# Patient Record
Sex: MALE | Race: WHITE | Employment: OTHER | URBAN - METROPOLITAN AREA
[De-identification: names, ages, dates, MRNs, and addresses within clinical notes are randomized per-mention and may not be internally consistent; named-entity substitution may affect disease eponyms.]

---

## 2017-01-19 ENCOUNTER — OFFICE VISIT (OUTPATIENT)
Dept: ORTHOPEDICS | Facility: CLINIC | Age: 82
End: 2017-01-19
Payer: COMMERCIAL

## 2017-01-19 VITALS
WEIGHT: 117 LBS | BODY MASS INDEX: 19.97 KG/M2 | SYSTOLIC BLOOD PRESSURE: 152 MMHG | DIASTOLIC BLOOD PRESSURE: 72 MMHG | HEIGHT: 64 IN

## 2017-01-19 DIAGNOSIS — M25.512 PAIN IN JOINT OF LEFT SHOULDER: ICD-10-CM

## 2017-01-19 DIAGNOSIS — M75.82 TENDINITIS OF LEFT ROTATOR CUFF: Primary | ICD-10-CM

## 2017-01-19 PROCEDURE — 99213 OFFICE O/P EST LOW 20 MIN: CPT | Mod: 25 | Performed by: FAMILY MEDICINE

## 2017-01-19 PROCEDURE — 20610 DRAIN/INJ JOINT/BURSA W/O US: CPT | Mod: LT | Performed by: FAMILY MEDICINE

## 2017-01-19 RX ORDER — TRIAMCINOLONE ACETONIDE 40 MG/ML
40 INJECTION, SUSPENSION INTRA-ARTICULAR; INTRAMUSCULAR ONCE
Qty: 1 ML | Refills: 0 | OUTPATIENT
Start: 2017-01-19 | End: 2017-01-19

## 2017-01-19 NOTE — NURSING NOTE
"Initial /72 mmHg  Ht 5' 3.5\" (1.613 m)  Wt 117 lb (53.071 kg)  BMI 20.40 kg/m2 Estimated body mass index is 20.4 kg/(m^2) as calculated from the following:    Height as of this encounter: 5' 3.5\" (1.613 m).    Weight as of this encounter: 117 lb (53.071 kg). .    Eduard Valle ATC  "
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
## 2017-01-23 DIAGNOSIS — R55 SYNCOPE AND COLLAPSE: ICD-10-CM

## 2017-01-23 DIAGNOSIS — I10 ESSENTIAL HYPERTENSION WITH GOAL BLOOD PRESSURE LESS THAN 140/90: ICD-10-CM

## 2017-01-23 DIAGNOSIS — E78.5 HYPERLIPIDEMIA LDL GOAL <100: ICD-10-CM

## 2017-01-23 DIAGNOSIS — I25.10 CORONARY ARTERY DISEASE INVOLVING NATIVE CORONARY ARTERY, ANGINA PRESENCE UNSPECIFIED, UNSPECIFIED WHETHER NATIVE OR TRANSPLANTED HEART: ICD-10-CM

## 2017-01-23 DIAGNOSIS — I25.10 CORONARY ARTERY DISEASE INVOLVING NATIVE CORONARY ARTERY OF NATIVE HEART WITHOUT ANGINA PECTORIS: ICD-10-CM

## 2017-01-23 LAB
ANION GAP SERPL CALCULATED.3IONS-SCNC: 8 MMOL/L (ref 3–14)
BUN SERPL-MCNC: 29 MG/DL (ref 7–30)
CALCIUM SERPL-MCNC: 9.2 MG/DL (ref 8.5–10.1)
CHLORIDE SERPL-SCNC: 105 MMOL/L (ref 94–109)
CO2 SERPL-SCNC: 25 MMOL/L (ref 20–32)
CREAT SERPL-MCNC: 1.31 MG/DL (ref 0.66–1.25)
GFR SERPL CREATININE-BSD FRML MDRD: 52 ML/MIN/1.7M2
GLUCOSE SERPL-MCNC: 106 MG/DL (ref 70–99)
POTASSIUM SERPL-SCNC: 3.9 MMOL/L (ref 3.4–5.3)
SODIUM SERPL-SCNC: 138 MMOL/L (ref 133–144)

## 2017-01-23 PROCEDURE — 36415 COLL VENOUS BLD VENIPUNCTURE: CPT | Performed by: INTERNAL MEDICINE

## 2017-01-23 PROCEDURE — 80048 BASIC METABOLIC PNL TOTAL CA: CPT | Performed by: INTERNAL MEDICINE

## 2017-01-25 ENCOUNTER — OFFICE VISIT (OUTPATIENT)
Dept: CARDIOLOGY | Facility: CLINIC | Age: 82
End: 2017-01-25
Attending: INTERNAL MEDICINE
Payer: COMMERCIAL

## 2017-01-25 VITALS
OXYGEN SATURATION: 97 % | BODY MASS INDEX: 19.7 KG/M2 | DIASTOLIC BLOOD PRESSURE: 62 MMHG | HEART RATE: 68 BPM | WEIGHT: 113 LBS | SYSTOLIC BLOOD PRESSURE: 104 MMHG

## 2017-01-25 DIAGNOSIS — I25.10 CORONARY ARTERY DISEASE INVOLVING NATIVE CORONARY ARTERY OF NATIVE HEART WITHOUT ANGINA PECTORIS: Primary | ICD-10-CM

## 2017-01-25 DIAGNOSIS — E78.5 HYPERLIPIDEMIA LDL GOAL <100: ICD-10-CM

## 2017-01-25 DIAGNOSIS — I10 ESSENTIAL HYPERTENSION WITH GOAL BLOOD PRESSURE LESS THAN 140/90: ICD-10-CM

## 2017-01-25 PROCEDURE — 99214 OFFICE O/P EST MOD 30 MIN: CPT | Performed by: PHYSICIAN ASSISTANT

## 2017-01-25 RX ORDER — ASPIRIN 325 MG
TABLET, DELAYED RELEASE (ENTERIC COATED) ORAL DAILY
COMMUNITY
End: 2017-09-28

## 2017-01-25 NOTE — PROGRESS NOTES
"HISTORY OF PRESENT ILLNESS:  Mr. Melgar is a pleasant 82-year-old gentleman who presents to the Cardiology office today for a 3-month followup.      The patient's cardiac history includes coronary artery disease status post myocardial infarction in 08/2009.  At that time, he underwent cardiac catheterization which showed 2-vessel coronary artery disease involving the LAD and obtuse marginal branch of the circumflex.  He did have a drug-eluting stent placed to the proximal LAD as well as angioplasty of the obtuse marginal at that time.  He also has a history of hypertension and hyperlipidemia as well as Alzheimer's.  He was last in to see Dr. Fisher in November.  No medication changes were made at that time.      At this point, the patient appears to overall be doing well from a cardiac standpoint.  He has some noncardiac issues including some shoulder discomfort for which he recently had an injection.  He and his wife are also somewhat concerned as his weight apparently has decreased 4 pounds in the past week.  This is based on 2 different scales at different offices.  The patient does not follow his weight at home.  The patient's wife states that he \"hardly eats anything.\"  When I review his weights in the chart over the past year, they have overall been fairly stable.      CURRENT CARDIAC MEDICATIONS:   1.  Aspirin 325 mg daily.   2.  Amlodipine 5 mg daily.   3.  Lisinopril 2.5 mg daily.   4.  Atorvastatin 20 mg daily.   5.  Toprol-XL 25 mg daily.   6.  Plavix 75 mg daily.   7.  Sublingual nitro p.r.n.      The remainder of his medications, allergies and review of systems were reviewed and as are documented separately.      PHYSICAL EXAMINATION:   GENERAL:  The patient is a pleasant 82-year-old gentleman who appears his stated age.  He is in no apparent distress.   VITAL SIGNS:  His blood pressure is 104/62, pulse is 68, weight is 113 pounds.   LUNGS:  Clear to auscultation bilaterally.   CARDIAC:  Reveals a " regular rate and rhythm, no murmurs appreciated.   ABDOMEN:  Soft, nontender, nondistended.   LOWER EXTREMITIES:  Show no evidence of edema.   NEUROLOGIC:  Alert and oriented.      LABORATORY DATA:  Basic metabolic panel from earlier this week shows a sodium of 138, potassium of 3.9, BUN of 29 and stable creatinine at 1.3.      ASSESSMENT:   1.  Coronary artery disease with prior stenting to the LAD and angioplasty.  The patient is not having any anginal symptoms.  He continues on excellent medical therapy.   2.  Hypertension.  Overall, appears well controlled.  No changes at this time.   3.  Hyperlipidemia.  Overall, well controlled.   4.  Alzheimer's.  He continues to follow with Neurology.      PLAN:   1.  The patient will continue his current cardiac medication regimen unchanged.   2.  I did try to encourage the patient to increase his nutritional intake.  I also suggested that they follow the patient's weight at home and if he truly is losing weight they need to discuss this with his primary care provider.   3.  The patient will follow up in the Cardiology office with Dr. Fisher in .  He will have repeat blood work and an echocardiogram prior to that office visit.  Of course, I encouraged his wife to contact us sooner with questions or concerns.         JACQUI ANN PA-C             D: 2017 11:33   T: 2017 13:52   MT: GEE      Name:     CONRADO WALDEN   MRN:      [id number]        Account:      [id number]   :      1934           Service Date: 2017      Document: [id number]

## 2017-01-25 NOTE — PATIENT INSTRUCTIONS
Thank you for your [facility] visit today. Your provider has recommended the following:  Medication Changes:  No changes today  Recommendations:  Try to increase your nutritional intake with more food/snacks. Or to use ensure or protein shakes for snacks (try to still eat meals even if you drink these)  Follow-up:  See Dr Fisher for cardiology follow up in May with labs and an echo prior.  We kindly ask that you call cardiology scheduling at [phone] three months prior to requested revisit date to schedule future cardiology appointments.  Reminder:  1. Please bring in your current medication list or your medication, over the counter supplements and vitamin bottles as we will review these at each office visit.               [facility]  [facility]~[address]~[place], [place]~[address]  Questions about your visit today?   Call your Cardiology Clinic RN's-Staci Cazares and/or Shilpi Acevedo at [phone].

## 2017-01-25 NOTE — MR AVS SNAPSHOT
After Visit Summary   1/25/2017    Evin Melgar    MRN: [id number]           Patient Information     Date Of Birth          6/1/1934        Visit Information        Provider Department      1/25/2017 9:40 AM Anitra Montaño PA-C [facility] PHYSICIAN HEART AT [facility]        Today's Diagnoses     Coronary artery disease involving native coronary artery of native heart without angina pectoris         Essential hypertension with goal blood pressure less than 140/90         Hyperlipidemia LDL goal <100         Syncope and collapse         Coronary artery disease involving native coronary artery, angina presence unspecified, unspecified whether native or transplanted heart           Care Instructions    Thank you for your [facility] visit today. Your provider has recommended the following:  Medication Changes:  No changes today  Recommendations:  Try to increase your nutritional intake with more food/snacks. Or to use ensure or protein shakes for snacks (try to still eat meals even if you drink these)  Follow-up:  See Dr Fisher for cardiology follow up in May with labs and an echo prior.  We kindly ask that you call cardiology scheduling at [phone] three months prior to requested revisit date to schedule future cardiology appointments.  Reminder:  1. Please bring in your current medication list or your medication, over the counter supplements and vitamin bottles as we will review these at each office visit.               [facility] HE[facility]ARE  [facility]~[address]~[place], [place]~[address]  Questions about your visit today?   Call your Cardiology Clinic RN's-Staci Cazares and/or Shilpi Acevedo at [phone].          Follow-ups after your visit        Your next 10 appointments already scheduled     Mar 16, 2017  2:15 PM   Return Visit with Katherine Lopez MD   [facility]    [address]  [place]  [facility] [address]   [phone]              Who to contact     If you have questions or need follow up information about today's clinic visit or your schedule please contact [facility] PHYSICIAN HEART AT [facility] directly at [phone].  Normal or non-critical lab and imaging results will be communicated to you by MyChart, letter or phone within 4 business days after the clinic has received the results. If you do not hear from us within 7 days, please contact the clinic through [employer] or phone. If you have a critical or abnormal lab result, we will notify you by phone as soon as possible.  Submit refill requests through [employer] or call your pharmacy and they will forward the refill request to us. Please allow 3 business days for your refill to be completed.          Additional Information About Your Visit        MyChart Information     [employer] gives you secure access to your electronic health record. If you see a primary care provider, you can also send messages to your care team and make appointments. If you have questions, please call your primary care clinic.  If you do not have a primary care provider, please call [phone] and they will assist you.        Care EveryWhere ID     This is your Care EveryWhere ID. This could be used by other organizations to access your [place] medical records  [id number]        Your Vitals Were     Pulse Pulse Oximetry                68 97%           Blood Pressure from Last 3 Encounters:   01/25/17 104/62   01/19/17 152/72   11/02/16 132/64    Weight from Last 3 Encounters:   01/25/17 51.256 kg (113 lb)   01/19/17 53.071 kg (117 lb)   11/02/16 53.071 kg (117 lb)              We Performed the Following     Follow-Up with Cardiac Advanced Practice Provider          Today's Medication Changes          These changes are accurate as of: 1/25/17  9:59 AM.  If you have any questions, ask your nurse or doctor.               These medicines  have changed or have updated prescriptions.        Dose/Directions    aspirin 325 MG EC tablet   This may have changed:  Another medication with the same name was removed. Continue taking this medication, and follow the directions you see here.        Take by mouth daily   Refills:  0                Primary Care Provider Office Phone # Fax #    [place] Mehdi Perez [phone]       [facility]        [address] [place] [address]        Thank you!     Thank you for choosing [facility] PHYSICIAN HEART AT [facility]  for your care. Our goal is always to provide you with excellent care. Hearing back from our patients is one way we can continue to improve our services. Please take a few minutes to complete the written survey that you may receive in the mail after your visit with us. Thank you!             Your Updated Medication List - Protect others around you: Learn how to safely use, store and throw away your medicines at www.disposemymeds.org.          This list is accurate as of: 1/25/17  9:59 AM.  Always use your most recent med list.                   Brand Name Dispense Instructions for use    albuterol 108 (90 BASE) MCG/ACT Inhaler    albuterol    1 Inhaler    Inhale 2 puffs into the lungs every 4 hours as needed for shortness of breath / dyspnea Rinse mouthpiece weekly.       amLODIPine 5 MG tablet    NORVASC     Take 1 tablet (5 mg) by mouth daily       aspirin 325 MG EC tablet      Take by mouth daily       atorvastatin 20 MG tablet    LIPITOR    90 tablet    Take 1 tablet (20 mg) by mouth daily       B-12 1000 MCG Tbcr     100 tablet    Take 1,000 mcg by mouth daily       clopidogrel 75 MG tablet    PLAVIX    90 tablet    Take 1 tablet (75 mg) by mouth daily       lisinopril 2.5 MG tablet    PRINIVIL/Zestril    90 tablet    Take 1 tablet (2.5 mg) by mouth daily       metoprolol 25 MG 24 hr tablet    TOPROL-XL    90 tablet    Take 1  tablet (25 mg) by mouth daily       nitroglycerin 0.4 MG sublingual tablet    NITROSTAT    25 tablet    Place 1 tablet (0.4 mg) under the tongue every 5 minutes as needed for chest pain for chest pain       ranitidine 150 MG tablet    ZANTAC    90 tablet    Take 1 tablet (150 mg) by mouth daily as needed for heartburn

## 2017-01-25 NOTE — Clinical Note
"1/25/2017    Brian Perez MD  [facility]          [address] [place] [address]    RE: Evin Melgar       Dear Colleague,    I had the pleasure of seeing Evin Melgar in the [facility].    Mr. Melgar is a pleasant 82-year-old gentleman who presents to the Cardiology office today for a 3-month followup.      The patient's cardiac history includes coronary artery disease status post myocardial infarction in 08/2009.  At that time, he underwent cardiac catheterization which showed 2-vessel coronary artery disease involving the LAD and obtuse marginal branch of the circumflex.  He did have a drug-eluting stent placed to the proximal LAD as well as angioplasty of the obtuse marginal at that time.  He also has a history of hypertension and hyperlipidemia as well as Alzheimer's.  He was last in to see Dr. Fisher in November.  No medication changes were made at that time.      At this point, the patient appears to overall be doing well from a cardiac standpoint.  He has some noncardiac issues including some shoulder discomfort for which he recently had an injection.  He and his wife are also somewhat concerned as his weight apparently has decreased 4 pounds in the past week.  This is based on 2 different scales at different offices.  The patient does not follow his weight at home.  The patient's wife states that he \"hardly eats anything.\"  When I review his weights in the chart over the past year, they have overall been fairly stable.      CURRENT CARDIAC MEDICATIONS:   1.  Aspirin 325 mg daily.   2.  Amlodipine 5 mg daily.   3.  Lisinopril 2.5 mg daily.   4.  Atorvastatin 20 mg daily.   5.  Toprol-XL 25 mg daily.   6.  Plavix 75 mg daily.   7.  Sublingual nitro p.r.n.      The remainder of his medications, allergies and review of systems were reviewed and as are documented separately.      PHYSICAL EXAMINATION:   GENERAL:  The patient is a pleasant " 82-year-old gentleman who appears his stated age.  He is in no apparent distress.   VITAL SIGNS:  His blood pressure is 104/62, pulse is 68, weight is 113 pounds.   LUNGS:  Clear to auscultation bilaterally.   CARDIAC:  Reveals a regular rate and rhythm, no murmurs appreciated.   ABDOMEN:  Soft, nontender, nondistended.   LOWER EXTREMITIES:  Show no evidence of edema.   NEUROLOGIC:  Alert and oriented.      LABORATORY DATA:  Basic metabolic panel from earlier this week shows a sodium of 138, potassium of 3.9, BUN of 29 and stable creatinine at 1.3.      ASSESSMENT:   1.  Coronary artery disease with prior stenting to the LAD and angioplasty.  The patient is not having any anginal symptoms.  He continues on excellent medical therapy.   2.  Hypertension.  Overall, appears well controlled.  No changes at this time.   3.  Hyperlipidemia.  Overall, well controlled.   4.  Alzheimer's.  He continues to follow with Neurology.      PLAN:   1.  The patient will continue his current cardiac medication regimen unchanged.   2.  I did try to encourage the patient to increase his nutritional intake.  I also suggested that they follow the patient's weight at home and if he truly is losing weight they need to discuss this with his primary care provider.   3.  The patient will follow up in the Cardiology office with Dr. Fisher in June.  He will have repeat blood work and an echocardiogram prior to that office visit.  Of course, I encouraged his wife to contact us sooner with questions or concerns.     Again, thank you for allowing me to participate in the care of your patient.      Sincerely,    Anitra Montaño PA-C     [facility]

## 2017-01-25 NOTE — PROGRESS NOTES
CURRENT MEDICATIONS:  Current Outpatient Prescriptions   Medication Sig Dispense Refill     aspirin 325 MG EC tablet Take by mouth daily       ranitidine (ZANTAC) 150 MG tablet Take 1 tablet (150 mg) by mouth daily as needed for heartburn 90 tablet 2     amLODIPine (NORVASC) 5 MG tablet Take 1 tablet (5 mg) by mouth daily       Cyanocobalamin (B-12) 1000 MCG TBCR Take 1,000 mcg by mouth daily 100 tablet 1     lisinopril (PRINIVIL,ZESTRIL) 2.5 MG tablet Take 1 tablet (2.5 mg) by mouth daily 90 tablet 3     atorvastatin (LIPITOR) 20 MG tablet Take 1 tablet (20 mg) by mouth daily 90 tablet 3     metoprolol (TOPROL-XL) 25 MG 24 hr tablet Take 1 tablet (25 mg) by mouth daily 90 tablet 3     clopidogrel (PLAVIX) 75 MG tablet Take 1 tablet (75 mg) by mouth daily 90 tablet 3     nitroglycerin (NITROSTAT) 0.4 MG SL tablet Place 1 tablet (0.4 mg) under the tongue every 5 minutes as needed for chest pain for chest pain 25 tablet 3     albuterol (ALBUTEROL) 108 (90 BASE) MCG/ACT inhaler Inhale 2 puffs into the lungs every 4 hours as needed for shortness of breath / dyspnea Rinse mouthpiece weekly. 1 Inhaler 11       ALLERGIES   No Known Allergies    PAST MEDICAL HISTORY:  Past Medical History   Diagnosis Date     Unspecified essential hypertension      Inguinal hernia without mention of obstruction or gangrene, unilateral or unspecified, (not specified as recurrent)      CAD (coronary artery disease) 9/4/2007     Unstable angina 8/07: status post PTCA/Cypher drug-eluting stent placed  proximal LAD,  PTCA of OM. 10/07: stopped taking his plavix, went to [facility]--got another angiogram- stents patent.  6/08: angiogram repeated at [facility], unclear reason.  Unknown results. 1/09: echo with normal EF. 7/09: small reversible area in the inferior, basal.  Normal EF. Followed by [facility] - recommend indefinite use of paige     Hypertension 12/13/2005     OAB (overactive bladder) 3/2/2012     PVR 0 2/12.       Microscopic hematuria 3/2/2012      "Memory deficit 3/2/2012     MMSE 23/30 2/12.       Mild persistent asthma 12/13/2005     Uses proair once/day \"out of habit\".  Denies any sob.        CKD (chronic kidney disease), stage III 11/4/2008     GFRs in the 50's     GERD (gastroesophageal reflux disease) 11/4/2008     Controlled on zantac      Tubular adenoma 10/21/2008     Noted on 10/08 colonoscopy, without atypia     Hyperlipidaemia        PAST SURGICAL HISTORY:  Past Surgical History   Procedure Laterality Date     Surgical history of -        Appendectomy     Angiogram  10/07     coronary=mild, diffuse CAD; stent below patent     Angioplasty  9/07     stent to ostia of LAD       Social History:  Social History   Substance Use Topics     Smoking status: Former Smoker     Quit date: 01/01/1949     Smokeless tobacco: Never Used     Alcohol Use: Yes      Comment: occasionally       FAMILY HISTORY:  Family History   Problem Relation Age of Onset     CEREBROVASCULAR DISEASE Mother      Lipids Mother      DIABETES Father      CEREBROVASCULAR DISEASE Father      DIABETES Brother      HEART DISEASE Brother        Review of Systems:  Skin:  Positive for bruising     Eyes:  Negative      ENT:  Positive for tinnitus    Respiratory:  Positive for shortness of breath COPD, Asthma   Cardiovascular:    Positive for;lightheadedness;fatigue;chest pain    Gastroenterology: Positive for heartburn;reflux weight loss, loss of appetite  Genitourinary:  Positive for urinary frequency CKD- patient does not see nephrology  Musculoskeletal:  Positive for   shoulder pain  Neurologic:  Positive for memory problems;headaches, Alzheimers sees Dr. Lopez  Psychiatric:  Positive for anxiety;depression    Heme/Lymph/Imm:  Negative      Endocrine:  Negative        Reviewed. Remainder of the note dictated.    Anitra Montaño PA-C      "

## 2017-02-06 ENCOUNTER — OFFICE VISIT (OUTPATIENT)
Dept: FAMILY MEDICINE | Facility: CLINIC | Age: 82
End: 2017-02-06
Payer: COMMERCIAL

## 2017-02-06 VITALS
TEMPERATURE: 99.5 F | BODY MASS INDEX: 18.85 KG/M2 | DIASTOLIC BLOOD PRESSURE: 70 MMHG | WEIGHT: 110.4 LBS | HEART RATE: 86 BPM | HEIGHT: 64 IN | OXYGEN SATURATION: 95 % | SYSTOLIC BLOOD PRESSURE: 131 MMHG

## 2017-02-06 DIAGNOSIS — J20.9 ACUTE BRONCHITIS WITH SYMPTOMS > 10 DAYS: Primary | ICD-10-CM

## 2017-02-06 PROCEDURE — 99213 OFFICE O/P EST LOW 20 MIN: CPT | Performed by: FAMILY MEDICINE

## 2017-02-06 RX ORDER — BENZONATATE 100 MG/1
100 CAPSULE ORAL 3 TIMES DAILY PRN
Qty: 21 CAPSULE | Refills: 0 | Status: SHIPPED | OUTPATIENT
Start: 2017-02-06 | End: 2017-06-08

## 2017-02-06 RX ORDER — AZITHROMYCIN 250 MG/1
TABLET, FILM COATED ORAL
Qty: 6 TABLET | Refills: 0 | Status: SHIPPED | OUTPATIENT
Start: 2017-02-06 | End: 2017-04-20

## 2017-02-06 NOTE — MR AVS SNAPSHOT
After Visit Summary   2/6/2017    Evin Melgar    MRN: [id number]           Patient Information     Date Of Birth          6/1/1934        Visit Information        Provider Department      2/6/2017 11:20 AM Brian Perez MD [facility]        Today's Diagnoses     Acute bronchitis with symptoms > 10 days    -  1       Care Instructions    Start Azithromycin as prescribed.  Benzonatate perles only as needed for coughing spells.  Albuterol inhaler 2 puffs every 4 hrs as needed for wheezing or coughing spells or tightness in breathing while active.    Bronchitis, Antibiotic Treatment (Adult)    Bronchitis is an infection of the air passages (bronchial tubes) in your lungs. It often occurs when you have a cold. This illness is contagious during the first few days and is spread through the air by coughing and sneezing, or by direct contact (touching the sick person and then touching your own eyes, nose, or mouth).  Symptoms of bronchitis include cough with mucus (phlegm) and low-grade fever. Bronchitis usually lasts 7 to 14 days. Mild cases can be treated with simple home remedies. More severe infection is treated with an antibiotic.  Home care  Follow these guidelines when caring for yourself at home:    If your symptoms are severe, rest at home for the first 2 to 3 days. When you go back to your usual activities, don't let yourself get too tired.    Do not smoke. Also avoid being exposed to secondhand smoke.    You may use over-the-counter medicines to control fever or pain, unless another medicine was prescribed. (Note: If you have chronic liver or kidney disease or have ever had a stomach ulcer or gastrointestinal bleeding, talk with your healthcare provider before using these medicines. Also talk to your provider if you are taking medicine to prevent blood clots.) Aspirin should never be given to anyone younger than 18 years of age who is ill with a viral infection or  fever. It may cause severe liver or brain damage.    Your appetite may be poor, so a light diet is fine. Avoid dehydration by drinking 6 to 8 glasses of fluids per day (such as water, soft drinks, sports drinks, juices, tea, or soup). Extra fluids will help loosen secretions in the nose and lungs.    Over-the-counter cough, cold, and sore-throat medicines will not shorten the length of the illness, but they may be helpful to reduce symptoms. (Note: Do not use decongestants if you have high blood pressure.)    Finish all antibiotic medicine. Do this even if you are feeling better after only a few days.  Follow-up care  Follow up with your healthcare provider, or as advised. If you had an X-ray or ECG (electrocardiogram), a specialist will review it. You will be notified of any new findings that may affect your care.  Note: If you are age 65 or older, or if you have a chronic lung disease or condition that affects your immune system, or you smoke, talk to your healthcare provider about having pneumococcal vaccinations and a yearly influenza vaccination (flu shot).  When to seek medical advice  Call your healthcare provider right away if any of these occur:    Fever of 100.4 F (38 C) or higher    Coughing up increased amounts of colored sputum    Weakness, drowsiness, headache, facial pain, ear pain, or a stiff neck   Call 911, or get immediate medical care  Contact emergency services right away if any of these occur.    Coughing up blood    Worsening weakness, drowsiness, headache, or stiff neck    Trouble breathing, wheezing, or pain with breathing    7238-2207 [employer]. [address] [place] [address], [place] [address]. All rights reserved. This information is not intended as a substitute for professional medical care. Always follow your healthcare professional's instructions.              Follow-ups after your visit        Your next 10 appointments already scheduled     Mar 16, 2017  2:15 PM   [facility] Visit  "with [facility] Lopez MD   [facility]    [address]  [facility] [address] [phone]              Who to contact     If you have questions or need follow up information about today's clinic visit or your schedule please contact [facility] directly at [phone].  Normal or non-critical lab and imaging results will be communicated to you by MyChart, letter or phone within 4 business days after the clinic has received the results. If you do not hear from us within 7 days, please contact the clinic through [employer] or phone. If you have a critical or abnormal lab result, we will notify you by phone as soon as possible.  Submit refill requests through [employer] or call your pharmacy and they will forward the refill request to us. Please allow 3 business days for your refill to be completed.          Additional Information About Your Visit        [employer] Information     [employer] gives you secure access to your electronic health record. If you see a primary care provider, you can also send messages to your care team and make appointments. If you have questions, please call your primary care clinic.  If you do not have a primary care provider, please call [phone] and they will assist you.        Care EveryWhere ID     This is your Care EveryWhere ID. This could be used by other organizations to access your [facility] medical records  [id number]        Your Vitals Were     Pulse Temperature Height BMI (Body Mass Index) Pulse Oximetry       86 99.5  F (37.5  C) (Tympanic) 5' 3.5\" (1.613 m) 19.25 kg/m2 95%        Blood Pressure from Last 3 Encounters:   02/06/17 131/70   01/25/17 104/62   01/19/17 152/72    Weight from Last 3 Encounters:   02/06/17 110 lb 6.4 oz (50.077 kg)   01/25/17 113 lb (51.256 kg)   01/19/17 117 lb (53.071 kg)              Today, you had the following     No orders found for display         Today's Medication Changes        "   These changes are accurate as of: 2/6/17 11:51 AM.  If you have any questions, ask your nurse or doctor.               Start taking these medicines.        Dose/Directions    azithromycin 250 MG tablet   Commonly known as:  ZITHROMAX   Used for:  Acute bronchitis with symptoms > 10 days   Started by:  Brian Perez MD        Two tablets orally  first day, then one tablet orally daily for four days.   Quantity:  6 tablet   Refills:  0       benzonatate 100 MG capsule   Commonly known as:  TESSALON   Used for:  Acute bronchitis with symptoms > 10 days   Started by:  Brian Perez MD        Dose:  100 mg   Take 1 capsule (100 mg) by mouth 3 times daily as needed for cough   Quantity:  21 capsule   Refills:  0            Where to get your medicines      These medications were sent to [address] [address]     Phone:  [phone]    - azithromycin 250 MG tablet  - benzonatate 100 MG capsule             Primary Care Provider Office Phone # Fax #    Brian Perez [phone]       [facility]        [address] [facility] [address]        Thank you!     Thank you for choosing [facility]  for your care. Our goal is always to provide you with excellent care. Hearing back from our patients is one way we can continue to improve our services. Please take a few minutes to complete the written survey that you may receive in the mail after your visit with us. Thank you!             Your Updated Medication List - Protect others around you: Learn how to safely use, store and throw away your medicines at www.disposemymeds.org.          This list is accurate as of: 2/6/17 11:51 AM.  Always use your most recent med list.                   Brand Name Dispense Instructions for use    albuterol 108 (90 BASE) MCG/ACT Inhaler    albuterol    1 Inhaler    Inhale 2  puffs into the lungs every 4 hours as needed for shortness of breath / dyspnea Rinse mouthpiece weekly.       amLODIPine 5 MG tablet    NORVASC     Take 1 tablet (5 mg) by mouth daily       aspirin 325 MG EC tablet      Take by mouth daily       atorvastatin 20 MG tablet    LIPITOR    90 tablet    Take 1 tablet (20 mg) by mouth daily       azithromycin 250 MG tablet    ZITHROMAX    6 tablet    Two tablets orally  first day, then one tablet orally daily for four days.       B-12 1000 MCG Tbcr     100 tablet    Take 1,000 mcg by mouth daily       benzonatate 100 MG capsule    TESSALON    21 capsule    Take 1 capsule (100 mg) by mouth 3 times daily as needed for cough       clopidogrel 75 MG tablet    PLAVIX    90 tablet    Take 1 tablet (75 mg) by mouth daily       lisinopril 2.5 MG tablet    PRINIVIL/Zestril    90 tablet    Take 1 tablet (2.5 mg) by mouth daily       metoprolol 25 MG 24 hr tablet    TOPROL-XL    90 tablet    Take 1 tablet (25 mg) by mouth daily       nitroglycerin 0.4 MG sublingual tablet    NITROSTAT    25 tablet    Place 1 tablet (0.4 mg) under the tongue every 5 minutes as needed for chest pain for chest pain       ranitidine 150 MG tablet    ZANTAC    90 tablet    Take 1 tablet (150 mg) by mouth daily as needed for heartburn

## 2017-02-06 NOTE — PATIENT INSTRUCTIONS
Start Azithromycin as prescribed.  Benzonatate perles only as needed for coughing spells.  Albuterol inhaler 2 puffs every 4 hrs as needed for wheezing or coughing spells or tightness in breathing while active.    Bronchitis, Antibiotic Treatment (Adult)    Bronchitis is an infection of the air passages (bronchial tubes) in your lungs. It often occurs when you have a cold. This illness is contagious during the first few days and is spread through the air by coughing and sneezing, or by direct contact (touching the sick person and then touching your own eyes, nose, or mouth).  Symptoms of bronchitis include cough with mucus (phlegm) and low-grade fever. Bronchitis usually lasts 7 to 14 days. Mild cases can be treated with simple home remedies. More severe infection is treated with an antibiotic.  Home care  Follow these guidelines when caring for yourself at home:    If your symptoms are severe, rest at home for the first 2 to 3 days. When you go back to your usual activities, don't let yourself get too tired.    Do not smoke. Also avoid being exposed to secondhand smoke.    You may use over-the-counter medicines to control fever or pain, unless another medicine was prescribed. (Note: If you have chronic liver or kidney disease or have ever had a stomach ulcer or gastrointestinal bleeding, talk with your healthcare provider before using these medicines. Also talk to your provider if you are taking medicine to prevent blood clots.) Aspirin should never be given to anyone younger than 18 years of age who is ill with a viral infection or fever. It may cause severe liver or brain damage.    Your appetite may be poor, so a light diet is fine. Avoid dehydration by drinking 6 to 8 glasses of fluids per day (such as water, soft drinks, sports drinks, juices, tea, or soup). Extra fluids will help loosen secretions in the nose and lungs.    Over-the-counter cough, cold, and sore-throat medicines will not shorten the length of  the illness, but they may be helpful to reduce symptoms. (Note: Do not use decongestants if you have high blood pressure.)    Finish all antibiotic medicine. Do this even if you are feeling better after only a few days.  Follow-up care  Follow up with your healthcare provider, or as advised. If you had an X-ray or ECG (electrocardiogram), a specialist will review it. You will be notified of any new findings that may affect your care.  Note: If you are age 65 or older, or if you have a chronic lung disease or condition that affects your immune system, or you smoke, talk to your healthcare provider about having pneumococcal vaccinations and a yearly influenza vaccination (flu shot).  When to seek medical advice  Call your healthcare provider right away if any of these occur:    Fever of 100.4 F (38 C) or higher    Coughing up increased amounts of colored sputum    Weakness, drowsiness, headache, facial pain, ear pain, or a stiff neck   Call 911, or get immediate medical care  Contact emergency services right away if any of these occur.    Coughing up blood    Worsening weakness, drowsiness, headache, or stiff neck    Trouble breathing, wheezing, or pain with breathing    1402-1847 [employer]. [address] [place] [address], [place] [address]. All rights reserved. This information is not intended as a substitute for professional medical care. Always follow your healthcare professional's instructions.

## 2017-02-06 NOTE — NURSING NOTE
"Chief Complaint   Patient presents with     URI     Pt has had a cough and cold for the past wk.        Initial /70 mmHg  Pulse 86  Temp(Src) 99.5  F (37.5  C) (Tympanic)  Ht 5' 3.5\" (1.613 m)  Wt 110 lb 6.4 oz (50.077 kg)  BMI 19.25 kg/m2  SpO2 95% Estimated body mass index is 19.25 kg/(m^2) as calculated from the following:    Height as of this encounter: 5' 3.5\" (1.613 m).    Weight as of this encounter: 110 lb 6.4 oz (50.077 kg).  Medication Reconciliation: complete  Laquita Umanzor CMA    "

## 2017-02-06 NOTE — PROGRESS NOTES
SUBJECTIVE:                                                    Evin Melgar is a 82 year old male who presents to clinic today for the following health issues:  Chief Complaint   Patient presents with     URI     Pt has had a cough and cold for the past wk.          ENT Symptoms             Symptoms: cc Present Absent Comment   Fever/Chills  x  Patient has not measured  Temperature at home.   Fatigue  x     Muscle Aches   x    Eye Irritation   x    Sneezing  x     Nasal Simba/Drg  x     Sinus Pressure/Pain   x    Loss of smell   x    Dental pain   x    Sore Throat   x    Swollen Glands   x    Ear Pain/Fullness   x    Cough x   No specific time of day it is worse.   Wheeze x      Chest Pain  x     Shortness of breath  x     Rash   x    Other         Symptom duration:  1 wk   Symptom severity:  severe with cough   Treatments tried:  inhaler, mucinex, cough meds - partial and temporarily relief.   Contacts:  wife also sick with cough   Verified above history with patient and wife.      Problem list and histories reviewed & adjusted, as indicated.  Additional history: as documented    Patient Active Problem List   Diagnosis     Hypertension     Mild persistent asthma without complication     CAD (coronary artery disease)     Tubular adenoma     CKD (chronic kidney disease), stage III     GERD (gastroesophageal reflux disease)     Hyperlipidemia LDL goal <100     Advanced directives, counseling/discussion     OAB (overactive bladder)     Microscopic hematuria     Memory deficit     Health Care Home     COPD, mild (H)     Pain of left upper arm     Past Surgical History   Procedure Laterality Date     Surgical history of -        Appendectomy     Angiogram  10/07     coronary=mild, diffuse CAD; stent below patent     Angioplasty  9/07     stent to ostia of LAD       Social History   Substance Use Topics     Smoking status: Former Smoker     Quit date: 01/01/1949     Smokeless tobacco: Never Used     Alcohol Use: Yes       Comment: occasionally     Family History   Problem Relation Age of Onset     CEREBROVASCULAR DISEASE Mother      Lipids Mother      DIABETES Father      CEREBROVASCULAR DISEASE Father      DIABETES Brother      HEART DISEASE Brother          Current Outpatient Prescriptions   Medication Sig Dispense Refill     azithromycin (ZITHROMAX) 250 MG tablet Two tablets orally  first day, then one tablet orally daily for four days. 6 tablet 0     benzonatate (TESSALON) 100 MG capsule Take 1 capsule (100 mg) by mouth 3 times daily as needed for cough 21 capsule 0     aspirin 325 MG EC tablet Take by mouth daily       ranitidine (ZANTAC) 150 MG tablet Take 1 tablet (150 mg) by mouth daily as needed for heartburn 90 tablet 2     amLODIPine (NORVASC) 5 MG tablet Take 1 tablet (5 mg) by mouth daily       Cyanocobalamin (B-12) 1000 MCG TBCR Take 1,000 mcg by mouth daily 100 tablet 1     lisinopril (PRINIVIL,ZESTRIL) 2.5 MG tablet Take 1 tablet (2.5 mg) by mouth daily 90 tablet 3     atorvastatin (LIPITOR) 20 MG tablet Take 1 tablet (20 mg) by mouth daily 90 tablet 3     metoprolol (TOPROL-XL) 25 MG 24 hr tablet Take 1 tablet (25 mg) by mouth daily 90 tablet 3     clopidogrel (PLAVIX) 75 MG tablet Take 1 tablet (75 mg) by mouth daily 90 tablet 3     albuterol (ALBUTEROL) 108 (90 BASE) MCG/ACT inhaler Inhale 2 puffs into the lungs every 4 hours as needed for shortness of breath / dyspnea Rinse mouthpiece weekly. 1 Inhaler 11     nitroglycerin (NITROSTAT) 0.4 MG SL tablet Place 1 tablet (0.4 mg) under the tongue every 5 minutes as needed for chest pain for chest pain 25 tablet 3     No Known Allergies    ROS:  C: NEGATIVE for fever, chills, change in weight  I: NEGATIVE for worrisome rashes, moles or lesions  E: NEGATIVE for vision changes or irritation  ENT/MOUTH: see above  RESP:as above  CV: NEGATIVE for chest pain, palpitations or peripheral edema  GI: NEGATIVE for nausea, abdominal pain, heartburn, or change in bowel  "habits  M: NEGATIVE for significant arthralgias or myalgia   OBJECTIVE:                                                    /70 mmHg  Pulse 86  Temp(Src) 99.5  F (37.5  C) (Tympanic)  Ht 5' 3.5\" (1.613 m)  Wt 110 lb 6.4 oz (50.077 kg)  BMI 19.25 kg/m2  SpO2 95%  Body mass index is 19.25 kg/(m^2).  GENERAL: alert and no distress  EYES: pink conjunctivae, no icterus  NECK: mild cervical adenopathy, nontender  HEENT: tympanic membrane intact and pearly bilaterally, nose with mild congestion, no sinus tenderness, throat mildly erythematous, no exudates, no oral ulcers  RESP: fair to good air entry all LF, mild end-exp wheeze on right upper to mid lung only, no rales, no rhonchi, no crackles  CV: regular rates and rhythm, normal S1 S2, no S3 or S4 and no murmur  SKIN:  Good turgor, no rashes    Diagnostic test results:  Diagnostic Test Results:  No results found for this or any previous visit (from the past 24 hour(s)).     ASSESSMENT/PLAN:                                                        ICD-10-CM    1. Acute bronchitis with symptoms > 10 days J20.9 azithromycin (ZITHROMAX) 250 MG tablet     benzonatate (TESSALON) 100 MG capsule   No distress.  Due to duration, severity of symptoms and findings on exam, antibiotics given.  Symptomatic measures: push oral fluids, antitussives, humidify air  Advised use of albuterol inhaler for bronchodilation.  Return precautions given.    Follow up with Provider - 2-3 days if worsening   Patient Instructions   Start Azithromycin as prescribed.  Benzonatate perles only as needed for coughing spells.  Albuterol inhaler 2 puffs every 4 hrs as needed for wheezing or coughing spells or tightness in breathing while active.    Bronchitis, Antibiotic Treatment (Adult)    Bronchitis is an infection of the air passages (bronchial tubes) in your lungs. It often occurs when you have a cold. This illness is contagious during the first few days and is spread through the air by " coughing and sneezing, or by direct contact (touching the sick person and then touching your own eyes, nose, or mouth).  Symptoms of bronchitis include cough with mucus (phlegm) and low-grade fever. Bronchitis usually lasts 7 to 14 days. Mild cases can be treated with simple home remedies. More severe infection is treated with an antibiotic.  Home care  Follow these guidelines when caring for yourself at home:    If your symptoms are severe, rest at home for the first 2 to 3 days. When you go back to your usual activities, don't let yourself get too tired.    Do not smoke. Also avoid being exposed to secondhand smoke.    You may use over-the-counter medicines to control fever or pain, unless another medicine was prescribed. (Note: If you have chronic liver or kidney disease or have ever had a stomach ulcer or gastrointestinal bleeding, talk with your healthcare provider before using these medicines. Also talk to your provider if you are taking medicine to prevent blood clots.) Aspirin should never be given to anyone younger than 18 years of age who is ill with a viral infection or fever. It may cause severe liver or brain damage.    Your appetite may be poor, so a light diet is fine. Avoid dehydration by drinking 6 to 8 glasses of fluids per day (such as water, soft drinks, sports drinks, juices, tea, or soup). Extra fluids will help loosen secretions in the nose and lungs.    Over-the-counter cough, cold, and sore-throat medicines will not shorten the length of the illness, but they may be helpful to reduce symptoms. (Note: Do not use decongestants if you have high blood pressure.)    Finish all antibiotic medicine. Do this even if you are feeling better after only a few days.  Follow-up care  Follow up with your healthcare provider, or as advised. If you had an X-ray or ECG (electrocardiogram), a specialist will review it. You will be notified of any new findings that may affect your care.  Note: If you are age 65  or older, or if you have a chronic lung disease or condition that affects your immune system, or you smoke, talk to your healthcare provider about having pneumococcal vaccinations and a yearly influenza vaccination (flu shot).  When to seek medical advice  Call your healthcare provider right away if any of these occur:    Fever of 100.4 F (38 C) or higher    Coughing up increased amounts of colored sputum    Weakness, drowsiness, headache, facial pain, ear pain, or a stiff neck   Call 911, or get immediate medical care  Contact emergency services right away if any of these occur.    Coughing up blood    Worsening weakness, drowsiness, headache, or stiff neck    Trouble breathing, wheezing, or pain with breathing    5041-5785 [employer]. [address] [place] [address], [place] [address]. All rights reserved. This information is not intended as a substitute for professional medical care. Always follow your healthcare professional's instructions.              Brian Perez MD  [facility]

## 2017-02-07 ASSESSMENT — ASTHMA QUESTIONNAIRES: ACT_TOTALSCORE: 11

## 2017-03-04 ENCOUNTER — TRANSFERRED RECORDS (OUTPATIENT)
Dept: HEALTH INFORMATION MANAGEMENT | Facility: CLINIC | Age: 82
End: 2017-03-04

## 2017-03-16 DIAGNOSIS — J45.40 MODERATE PERSISTENT ASTHMA WITHOUT COMPLICATION: ICD-10-CM

## 2017-03-16 NOTE — TELEPHONE ENCOUNTER
Kaushik       Last Written Prescription Date: 02/04/16  Last Fill Quantity: 1, # refills: 11    Last Office Visit with [facility], [facility] or [facility] prescribing provider:  02/06/17   Future Office Visit:    Next 5 appointments (look out 90 days)     Mar 28, 2017 12:45 PM CDT   [facility] Katherine Lopez MD   [facility]    [address] [address]   [phone]                   Date of Last Asthma Action Plan Letter:   Asthma Action Plan Q1 Year    Topic Date Due     Asthma Action Plan - yearly  06/06/2017      Asthma Control Test:   ACT Total Scores 2/6/2017   ACT TOTAL SCORE (Goal Greater than or Equal to 20) 11   In the past 12 months, how many times did you visit the emergency room for your asthma without being admitted to the hospital? 0   In the past 12 months, how many times were you hospitalized overnight because of your asthma? 0       Date of Last Spirometry Test:   No results found for this or any previous visit.

## 2017-03-20 RX ORDER — ALBUTEROL SULFATE 90 UG/1
2 AEROSOL, METERED RESPIRATORY (INHALATION) EVERY 4 HOURS PRN
Qty: 1 INHALER | Refills: 11 | Status: SHIPPED | OUTPATIENT
Start: 2017-03-20 | End: 2017-12-18

## 2017-03-20 NOTE — TELEPHONE ENCOUNTER
Routing refill request to provider for review/approval because:  ACT is <20     Thank you  Anitra MAYS RN

## 2017-03-28 ENCOUNTER — OFFICE VISIT (OUTPATIENT)
Dept: NEUROLOGY | Facility: CLINIC | Age: 82
End: 2017-03-28
Payer: COMMERCIAL

## 2017-03-28 VITALS
HEIGHT: 64 IN | SYSTOLIC BLOOD PRESSURE: 116 MMHG | BODY MASS INDEX: 19.7 KG/M2 | HEART RATE: 70 BPM | TEMPERATURE: 98 F | DIASTOLIC BLOOD PRESSURE: 60 MMHG | WEIGHT: 115.4 LBS

## 2017-03-28 DIAGNOSIS — G30.1 LATE ONSET ALZHEIMER'S DISEASE WITHOUT BEHAVIORAL DISTURBANCE (H): Primary | ICD-10-CM

## 2017-03-28 DIAGNOSIS — F02.80 LATE ONSET ALZHEIMER'S DISEASE WITHOUT BEHAVIORAL DISTURBANCE (H): Primary | ICD-10-CM

## 2017-03-28 DIAGNOSIS — F41.9 ANXIETY: ICD-10-CM

## 2017-03-28 PROCEDURE — 99215 OFFICE O/P EST HI 40 MIN: CPT | Performed by: PSYCHIATRY & NEUROLOGY

## 2017-03-28 ASSESSMENT — MONTREAL COGNITIVE ASSESSMENT (MOCA)
8. SERIAL SUBTRACTION OF 7S: 3
12. MEMORY INDEX SCORE: 1
13. ORIENTATION SUBSCORE: 4
4. NAME EACH OF THE THREE ANIMALS SHOWN: 2
VISUOSPATIAL/EXECUTIVE SUBSCORE: 1
WHAT IS THE TOTAL SCORE (OUT OF 30): 14
WHAT LEVEL OF EDUCATION WAS ATTAINED: 1
9. REPEAT EACH SENTENCE: 0
7. [VIGILENCE] TAP WHEN HEARING DESIGNATED LETTER: 0
6. READ LIST OF DIGITS [FORWARD/BACKWARD]: 1
10. [FLUENCY] NAME WORDS STARTING WITH DESIGNATED LETTER: 0
11. FOR EACH PAIR OF WORDS, WHAT CATEGORY DO THEY BELONG TO (OUT OF 2): 1

## 2017-03-28 NOTE — MR AVS SNAPSHOT
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  No driving - for your safety and the safety of others.   Referral to Mental Health to address the anxiety  Referral to Care coordinators for community resources / transportation options.   Return to clinic in 6 months.        Follow-ups after your visit        Additional Services     CARE COORDINATION REFERRAL       Services are provided by a Care Coordinator for people with complex needs such as: medical, social, or financial troubles.  The Care Coordinator works with the patient and their Primary Care Provider to determine health goals, obtain resources, achieve outcomes, and develop care plans that help coordinate the patient's care.     Reason for Referral: Caregiver Concerns    Provide additional details for Care Coordination to best meet the patient's current needs: socialization, perhaps some transportation options. Community programs.     Clinical Staff have discussed the Care Coordination Referral with the patient and/or caregiver: yes            MENTAL HEALTH REFERRAL       Your provider has referred you to: [facility]: [facility] - Counseling (Individual/Couples/Family) - [facility] [phone]   [email or web address]   *Patient will be contacted by [facility] scheduling partner, [facility]), to schedule an appointment.  Patients may also call [facility] to schedule.    All scheduling is subject to the client's specific insurance plan &  "benefits, provider/location availability, and provider clinical specialities.  Please arrive 15 minutes early for your first appointment and bring your completed paperwork.    Please be aware that coverage of these services is subject to the terms and limitations of your health insurance plan.  Call member services at your health plan with any benefit or coverage questions.                  Follow-up notes from your care team     Return in about 6 months (around 9/28/2017).      Who to contact     If you have questions or need follow up information about today's clinic visit or your schedule please contact [facility] directly at [phone].  Normal or non-critical lab and imaging results will be communicated to you by [employer], letter or phone within 4 business days after the clinic has received the results. If you do not hear from us within 7 days, please contact the clinic through [employer] or phone. If you have a critical or abnormal lab result, we will notify you by phone as soon as possible.  Submit refill requests through [employer] or call your pharmacy and they will forward the refill request to us. Please allow 3 business days for your refill to be completed.          Additional Information About Your Visit        MyChart Information     [employer] gives you secure access to your electronic health record. If you see a primary care provider, you can also send messages to your care team and make appointments. If you have questions, please call your primary care clinic.  If you do not have a primary care provider, please call [phone] and they will assist you.        Care EveryWhere ID     This is your Care EveryWhere ID. This could be used by other organizations to access your [place] medical records  [id number]        Your Vitals Were     Pulse Temperature Height BMI (Body Mass Index)          70 98  F (36.7  C) (Oral) 5' 3.5\" (1.613 m) 20.12 kg/m2         Blood Pressure from Last 3 Encounters: "   03/28/17 116/60   02/06/17 131/70   01/25/17 104/62    Weight from Last 3 Encounters:   03/28/17 115 lb 6.4 oz (52.3 kg)   02/06/17 110 lb 6.4 oz (50.1 kg)   01/25/17 113 lb (51.3 kg)              We Performed the Following     CARE COORDINATION REFERRAL     MENTAL HEALTH REFERRAL        Primary Care Provider Office Phone # Fax #    Brian Perez [phone]       [facility]        [address] [address]        Thank you!     Thank you for choosing [facility]  for your care. Our goal is always to provide you with excellent care. Hearing back from our patients is one way we can continue to improve our services. Please take a few minutes to complete the written survey that you may receive in the mail after your visit with us. Thank you!             Your Updated Medication List - Protect others around you: Learn how to safely use, store and throw away your medicines at www.disposemymeds.org.          This list is accurate as of: 3/28/17  1:40 PM.  Always use your most recent med list.                   Brand Name Dispense Instructions for use    albuterol 108 (90 BASE) MCG/ACT Inhaler    albuterol    1 Inhaler    Inhale 2 puffs into the lungs every 4 hours as needed for shortness of breath / dyspnea Rinse mouthpiece weekly.       amLODIPine 5 MG tablet    NORVASC    30 tablet    Take 1 tablet (5 mg) by mouth daily       aspirin 325 MG EC tablet      Take by mouth daily       atorvastatin 20 MG tablet    LIPITOR    90 tablet    Take 1 tablet (20 mg) by mouth daily       azithromycin 250 MG tablet    ZITHROMAX    6 tablet    Two tablets orally  first day, then one tablet orally daily for four days.       B-12 1000 MCG Tbcr     100 tablet    Take 1,000 mcg by mouth daily       benzonatate 100 MG capsule    TESSALON    21 capsule    Take 1 capsule (100 mg) by mouth 3 times daily as needed for cough       clopidogrel 75 MG tablet     PLAVIX    90 tablet    Take 1 tablet (75 mg) by mouth daily       lisinopril 2.5 MG tablet    PRINIVIL/Zestril    90 tablet    Take 1 tablet (2.5 mg) by mouth daily       metoprolol 25 MG 24 hr tablet    TOPROL-XL    90 tablet    Take 1 tablet (25 mg) by mouth daily       nitroglycerin 0.4 MG sublingual tablet    NITROSTAT    25 tablet    Place 1 tablet (0.4 mg) under the tongue every 5 minutes as needed for chest pain for chest pain       ranitidine 150 MG tablet    ZANTAC    90 tablet    Take 1 tablet (150 mg) by mouth daily as needed for heartburn

## 2017-03-28 NOTE — PROGRESS NOTES
ESTABLISHED PATIENT NEUROLOGY NOTE    DATE OF VISIT: 3/28/2017  MRN: [id number]  PATIENT NAME: Evin Melgar  YOB: 1934    Chief Complaint   Patient presents with     RECHECK     6 month for memory issues     SUBJECTIVE:                                                      HISTORY OF PRESENT ILLNESS:  Evin is here for follow up regarding Alzheimer's disease. He also has a history of Left cerebellar strokes, SVID and cerebral atrophy.      The patient's daughter sent a note via [employer] asking if we could discuss the recommendation about not driving once again. They have also been having trouble convincing Evin to take his B12 supplement. The patient has not yet been seen in mental health, though I had suggested doing so given his apparent anxiety.     Per Dr. Delarosa's Neuropsych assessment (5.10.16):  These fairly global deficits imply widespread brain dysfunction, involving both cortical and subcortical brain regions. This is not comfortably attributable to the history of lifelong academic struggles and normal aging, and instead is worrisome for a fairly advanced dementia, probably of the Alzheimer s sort. I agree it s imprudent for him to drive. He now requires supervision from loved ones and should not be taking on consequential activities such as financial planning, record keeping/bill paying, etc.     Today the patient is accompanied by his daughter, Sruthi, and his wife. They report that he still has problems with getting confused. The main concern is refusal to accept that he should no longer be driving (as above). The patient thinks he is just fine to drove, though he has plenty of family support for rides. The lack of freedom has led to decreased socialization which the family feels he really misses. He still talks about how he had to quit his job and how frustrating this is. He cannot explain why he is refusing to take his B12. His wife endorses anxiety. They are unaware of the techniques  we discussed last time in clinic regarding this.  He complains of some occasional muscle pain around the abdomen. Otherwise he feels that he is doing well.     CURRENT MEDICATIONS:     Current Outpatient Prescriptions on File Prior to Visit:  albuterol (ALBUTEROL) 108 (90 BASE) MCG/ACT Inhaler Inhale 2 puffs into the lungs every 4 hours as needed for shortness of breath / dyspnea Rinse mouthpiece weekly.   amLODIPine (NORVASC) 5 MG tablet Take 1 tablet (5 mg) by mouth daily   azithromycin (ZITHROMAX) 250 MG tablet Two tablets orally  first day, then one tablet orally daily for four days.   benzonatate (TESSALON) 100 MG capsule Take 1 capsule (100 mg) by mouth 3 times daily as needed for cough   aspirin 325 MG EC tablet Take by mouth daily   ranitidine (ZANTAC) 150 MG tablet Take 1 tablet (150 mg) by mouth daily as needed for heartburn   Cyanocobalamin (B-12) 1000 MCG TBCR Take 1,000 mcg by mouth daily   lisinopril (PRINIVIL,ZESTRIL) 2.5 MG tablet Take 1 tablet (2.5 mg) by mouth daily   atorvastatin (LIPITOR) 20 MG tablet Take 1 tablet (20 mg) by mouth daily   metoprolol (TOPROL-XL) 25 MG 24 hr tablet Take 1 tablet (25 mg) by mouth daily   clopidogrel (PLAVIX) 75 MG tablet Take 1 tablet (75 mg) by mouth daily   nitroglycerin (NITROSTAT) 0.4 MG SL tablet Place 1 tablet (0.4 mg) under the tongue every 5 minutes as needed for chest pain for chest pain     No current facility-administered medications on file prior to visit.     RECENT DIAGNOSTIC STUDIES:   Labs:   Results for orders placed or performed in visit on 01/23/17   Basic metabolic panel   Result Value Ref Range    Sodium 138 133 - 144 mmol/L    Potassium 3.9 3.4 - 5.3 mmol/L    Chloride 105 94 - 109 mmol/L    Carbon Dioxide 25 20 - 32 mmol/L    Anion Gap 8 3 - 14 mmol/L    Glucose 106 (H) 70 - 99 mg/dL    Urea Nitrogen 29 7 - 30 mg/dL    Creatinine 1.31 (H) 0.66 - 1.25 mg/dL    GFR Estimate 52 (L) >60 mL/min/1.7m2    GFR Estimate If Black 63 >60 mL/min/1.7m2     "Calcium 9.2 8.5 - 10.1 mg/dL       REVIEW OF SYSTEMS:                                                      10-point review of systems is negative except as mentioned above in HPI.     EXAM:                                                      Physical Exam:   Vitals: /60 (BP Location: Right arm, Patient Position: Chair, Cuff Size: Adult Regular)  Pulse 70  Temp 98  F (36.7  C) (Oral)  Ht 5' 3.5\" (1.613 m)  Wt 115 lb 6.4 oz (52.3 kg)  BMI 20.12 kg/m2  BMI= Body mass index is 20.12 kg/(m^2).  GENERAL: NAD.   Neurologic:  MENTAL STATUS: Alert, attentive though easily distracted, perseverative. Speech is fluent. Fair comprehension with cues.   CRANIAL NERVES: Visual fields intact to confrontation. Pupils equally, round and reactive to light. Facial sensation and movement normal. EOM full. Hearing intact to conversation. Trapezius strength intact. Palate moves symmetrically. Tongue midline.  MOTOR: 4+/5 in proximal and distal muscle groups of lower extremities; Slight weakness of Left foot inversion. Tone and bulk normal.    DTRs: Brisk throughout. Ankle jerks present. Babinski down-going.    SENSATION: Normal light touch throughout. Intact proprioception. Vibration: Slightly decreased at both ankles.    COORDINATION: Slow, but normal finger nose finger.  STATION AND GAIT: Sway with Romberg. Poor postural reflexes. Tandem minimally unsteady.  CV: RRR. S1, S2.    NECK: No bruits.      ASSESSMENT and PLAN:                                                      Assessment and Plan:    ICD-10-CM    1. Late onset Alzheimer's disease without behavioral disturbance G30.1 CARE COORDINATION REFERRAL    F02.80 MENTAL HEALTH REFERRAL   2. Anxiety F41.9 MENTAL HEALTH REFERRAL       Mr. Melgar is a pleasant 81 yo man with several-year history of memory problems and diagnosis via Neuropsych testing of likely Alzheimer's disease. The patient's family seems to be doing well in terms of managing his care, however, they are " interested in care coordinator help at this point, to see if they can help Evin be more social again. Anxiety is also playing a role though it is hard to tell if this is related to the memory issues, given that the patient has such poor insight. He appears sad though, so I encouraged him to follow my recommendation to see a mental health provider for additional help. He is open to this. We spent the majority of the visit discussing the driving issue. I again reiterated that his providers recommend not driving. The patient's family understands the plan.      From a stroke standpoint, secondary prevention is in the capable hands of  and Dr. Fisher.     Patient Instructions:  Restart daily B12 supplements.  No driving - for your safety and the safety of others.   Referral to Mental Health to address the anxiety  Referral to Care coordinators for community resources / transportation options.   Return to clinic in 6 months.    Total Time: 40 minutes were spent with the family and patient. More than 50% of the time spent on counseling (as described above in Assessment and Plan) /coordinating the care.    Katherine Lopez MD  Neurology

## 2017-03-28 NOTE — PATIENT INSTRUCTIONS
Per Physician's instructions    Plan:  Restart daily B12 supplements.  No driving - for your safety and the safety of others.   Referral to Mental Health to address the anxiety  Referral to Care coordinators for community resources / transportation options.   Return to clinic in 6 months.

## 2017-03-28 NOTE — NURSING NOTE
"Initial /60 (BP Location: Right arm, Patient Position: Chair, Cuff Size: Adult Regular)  Pulse 70  Temp 98  F (36.7  C) (Oral)  Ht 5' 3.5\" (1.613 m)  Wt 115 lb 6.4 oz (52.3 kg)  BMI 20.12 kg/m2 Estimated body mass index is 20.12 kg/(m^2) as calculated from the following:    Height as of this encounter: 5' 3.5\" (1.613 m).    Weight as of this encounter: 115 lb 6.4 oz (52.3 kg). .    Suri Adame CMA  "

## 2017-03-29 ENCOUNTER — CARE COORDINATION (OUTPATIENT)
Dept: CASE MANAGEMENT | Facility: CLINIC | Age: 82
End: 2017-03-29

## 2017-03-29 NOTE — LETTER
[facility] Home  Complex Care Plan  About Me  Patient Name:  Evin Melgar    YOB: 1934  Age:   82 year old   [facility] MRN: [id number] Telephone Information:     Home Phone [phone]   Mobile Not on file.       Address:    [address] [place] [address] [address]  [address]  [facility] [address] Email address:  [email or web address]      Emergency Contact(s)  Name Relationship Lgl Grd Work Phone Home Phone Mobile Phone   1. GISELA MELGAR Spouse  none [phone] none   2. RONALD DE LA CRUZ Daughter  none [phone] none           Primary language:  English     needed? No   [facility] [employer]:  [phone] op. 1  Other communication barriers: No  Preferred Method of Communication:  Yi  Current living arrangement: I live in a private home with spouse  Mobility Status/ Medical Equipment: Independent  Other information to know about me:    Health Maintenance  Health Maintenance Reviewed: Due/Overdue    PNEUMOCOCCAL (2 of 2 - PCV13)  11/18/2011       MICROALBUMIN Q1 YEAR( NO INBASKET)  2/9/2013       COLONOSCOPY Q5 YR INBASKET MESSAGE  10/17/2013  3/21/2015     COPD ACTION PLAN Q1 YR  1/8/2016       ADVANCE DIRECTIVE PLANNING Q5 YRS (NO INBASKET)  3/28/2016       FALL RISK ASSESSMENT  1/15/2017         My Access Plan  Medical Emergency 911   Primary Clinic Line [facility]- [phone]   24 Hour Appointment Line [phone] or  [phone]) (toll-free)   24 Hour Nurse Line [phone] (toll-free)   Preferred Urgent Care  (NA)   Preferred Hospital  (NA)   Preferred Pharmacy [facility] - [place], [place] - [address] [place] [address]     Behavioral Health Crisis Line Crisis Connection, [phone] or 911     My Care Team Members  Patient Care Team       Relationship Specialty Notifications Start End    Brian Perez MD PCP - General Family Practice  2/24/14     Phone: [phone] Fax: [phone]         [facility]        [address]         [facility] [address]    Madhavi Delarosa, JONO   Psychology  4/20/16     Phone: [phone] Fax: [phone]         [facility] [address] [facility] [address] [facility] [address]    Josefina Orona, BSW    3/29/17     Comment:  [facility] Care Coordinator P: [phone]         My Care Plans  Self Management and Treatment Plan  Goals and (Comments)  Goal #1: I would like to identify an alternative form of transportation by summer.      30% of goal reached    Action Plans on File:  NA  Advance Care Plans/Directives Type: I included a copy of a Health Care Directive please review with your family, once completed return to the clinic so your medical providers know your wishes.        My Medical and Care Information  Problem List   Patient Active Problem List   Diagnosis     Hypertension     Mild persistent asthma without complication     CAD (coronary artery disease)     Tubular adenoma     CKD (chronic kidney disease), stage III     GERD (gastroesophageal reflux disease)     Hyperlipidemia LDL goal <100     Advanced directives, counseling/discussion     OAB (overactive bladder)     Microscopic hematuria     Memory deficit     Health Care Home     COPD, mild (H)     Pain of left upper arm      Current Medications and Allergies:  See printed Medication Report.    Care Coordination Start Date: 03/29/17   Frequency of Care Coordination: as needed   Form Last Updated: 03/29/2017

## 2017-03-29 NOTE — PROGRESS NOTES
Clinic Care Coordination Contact  OUTREACH    Referral Information:  Referral Source: PCP  Reason for Contact: Initial assessment for social supports  Care Conference: No     Universal Utilization:   ED Visits in last year: 0  Hospital visits in last year: 0  Last PCP appointment: 03/28/17  Missed Appointments: 13  Concerns: No concerns  Multiple Providers or Specialists: Yes    Clinical Concerns:  Current Medical Concerns:   Patient Active Problem List   Diagnosis     Hypertension     Mild persistent asthma without complication     CAD (coronary artery disease)     Tubular adenoma     CKD (chronic kidney disease), stage III     GERD (gastroesophageal reflux disease)     Hyperlipidemia LDL goal <100     Advanced directives, counseling/discussion     OAB (overactive bladder)     Microscopic hematuria     Memory deficit     Health Care Home     COPD, mild (H)     Pain of left upper arm       Current Behavioral Concerns: Lack of insight into memory deficits    Education Provided to patient: Senior Linkage Line, Alzheimer's Association, support groups, community involvement; [employer], recreation, private duty services to assist at home, transport resources, drivers assessment. Provided contact information for clinic care coordination along with a flyer  Clinical Pathway: None    Medication Management:  Patient understands and is adherent     Functional Status:  Mobility Status: Independent  Equipment Currently Used at Home: none  Transportation: Spouse/Family can provide, Pt is interested in taking a 's assesment    Psychosocial:  Current living arrangement:: I live in a private home with spouse  Financial/Insurance: [facility], No concerns discussed     Resources and Interventions:  Advanced Care Plans/Directives on file:: No  Referrals Placed: Community Resources, Transportation, Mental Health     Goals:   Goal 1 Statement: I would like to find two community resources to utilize by summer.  Goal 1  "Progression Percent: 30%  Goal 1 Progression Date: 03/29/17  Barriers: memory loss  Strengths: pt interested in resources    Patient/Caregiver understanding: Spoke with patient who reports frustration with being told not to drive repeating \"they didn't even give me a chance.\" Educated on driving assessments discussed costs involved patient expressed interest will mail out information. Discussed alternative transportation information and Alzheimer's Association. Patient interested in all of the above information.   Frequency of Care Coordination: as needed  Upcoming appointment: 09/28/17     Plan:  KAYLEEN PROCTOR will mail out information, introduction letter, complex care plan, and follow-up with patient in 1-2 weeks.     Josefina Orona [facility]  Clinic Care Coordinator  [facility]  [phone]        "

## 2017-03-29 NOTE — LETTER
[facility] - CARE MANAGEMENT DEPT  [address] [place] [address]  Phone: [phone]      March 29, 2017      Evin Melgar  [address] [place] [address] [place] [address]    [facility] [address]    Dear Evin,  I am the Clinic Care Coordinator that works with your primary care provider's clinic. I wanted to thank you for spending the time to talk with me.  Included is a flyer with a description of what Clinic Care Coordination is and how I can further assist you.     Please feel free to keep this letter and contact information to contact me at [phone] with any further questions or concerns that may arise. We at [facility] are focused on providing you with the highest-quality healthcare experience possible and that all starts with you.       Sincerely,     oJsefina Oroan    Enclosed: Transportation options, Driving assessment information, and my contact information.

## 2017-04-10 ENCOUNTER — TELEPHONE (OUTPATIENT)
Dept: FAMILY MEDICINE | Facility: CLINIC | Age: 82
End: 2017-04-10

## 2017-04-10 NOTE — TELEPHONE ENCOUNTER
"Reason for Call:  Other chest pain     Detailed comments: wife is calling stating \" my  is claming to have chest pain for 2 days\" wife says he has alzheimer's and she does not really know if he really knows     Phone Number Patient can be reached at: Home number on file [phone] (home)    Best Time: sent to RN      Can we leave a detailed message on this number? YES    Call taken on 4/10/2017 at 8:50 AM by Reva Blood      "

## 2017-04-10 NOTE — TELEPHONE ENCOUNTER
Patient reports:  After supper last night, constant pressure in his chest, like he has to burp, but cant burp  Pain by Left lung, feels like pressure  History of heart attack 5 years ago  Denies SOB  Pain not relieved by antiacids, or pain medication  Advised patient to go to ED now.  Patient verbalized understanding agreed with plan.    Santa HAYS Rn

## 2017-04-20 ENCOUNTER — OFFICE VISIT (OUTPATIENT)
Dept: FAMILY MEDICINE | Facility: CLINIC | Age: 82
End: 2017-04-20
Payer: COMMERCIAL

## 2017-04-20 VITALS
BODY MASS INDEX: 19.5 KG/M2 | SYSTOLIC BLOOD PRESSURE: 133 MMHG | OXYGEN SATURATION: 99 % | DIASTOLIC BLOOD PRESSURE: 69 MMHG | WEIGHT: 114.2 LBS | HEIGHT: 64 IN | HEART RATE: 61 BPM | TEMPERATURE: 98.2 F

## 2017-04-20 DIAGNOSIS — H61.23 BILATERAL IMPACTED CERUMEN: ICD-10-CM

## 2017-04-20 DIAGNOSIS — J30.2 SEASONAL ALLERGIC RHINITIS, UNSPECIFIED ALLERGIC RHINITIS TRIGGER: ICD-10-CM

## 2017-04-20 DIAGNOSIS — R06.2 WHEEZING: Primary | ICD-10-CM

## 2017-04-20 PROCEDURE — 99214 OFFICE O/P EST MOD 30 MIN: CPT | Mod: 25 | Performed by: FAMILY MEDICINE

## 2017-04-20 PROCEDURE — 69209 REMOVE IMPACTED EAR WAX UNI: CPT | Mod: 50 | Performed by: FAMILY MEDICINE

## 2017-04-20 RX ORDER — PREDNISONE 20 MG/1
40 TABLET ORAL DAILY
Qty: 10 TABLET | Refills: 0 | Status: SHIPPED | OUTPATIENT
Start: 2017-04-20 | End: 2017-04-25

## 2017-04-20 RX ORDER — FLUTICASONE PROPIONATE 50 MCG
1-2 SPRAY, SUSPENSION (ML) NASAL DAILY
Qty: 16 G | Refills: 1 | Status: SHIPPED | OUTPATIENT
Start: 2017-04-20 | End: 2017-06-15

## 2017-04-20 NOTE — MR AVS SNAPSHOT
After Visit Summary   4/20/2017    Evin Melgar    MRN: [id number]           Patient Information     Date Of Birth          6/1/1934        Visit Information        Provider Department      4/20/2017 9:20 AM Brian Perez MD [facility]        Today's Diagnoses     Wheezing    -  1    Seasonal allergic rhinitis, unspecified allergic rhinitis trigger        Bilateral impacted cerumen          Care Instructions    Your symptoms are due to bronchospasm or tightening of the airways due to recent respiratory infection or from allergies.  Prednisone is started to decrease inflammation.  Use albuterol inhaler as prescribed previously.  Drink plenty of oral fluids.  If no improvement after 1 week, see doctor again.  If with respiratory distress, go to ER.    Earwax  Ear irrigation performed successfully today.  Do not to probe ear canal with any foreign object to prevent impaction or eardruum perforation. If with more symptoms or new symptoms, return to clinic.  You may consider having your ears examined and possibly flushed in clinic at least once a year or as needed.        Follow-ups after your visit        Follow-up notes from your care team     Return if symptoms worsen or fail to improve.      Your next 10 appointments already scheduled     Sep 28, 2017  8:00 AM CDT   Return Visit with Katherine Lopez MD   [facility]    [address] [facility] [address] [phone]              Who to contact     If you have questions or need follow up information about today's clinic visit or your schedule please contact [facility] directly at [phone].  Normal or non-critical lab and imaging results will be communicated to you by MyChart, letter or phone within 4 business days after the clinic has received the results. If you do not hear from us within 7 days, please contact the clinic through MyChart or phone. If  "you have a critical or abnormal lab result, we will notify you by phone as soon as possible.  Submit refill requests through el? or call your pharmacy and they will forward the refill request to us. Please allow 3 business days for your refill to be completed.          Additional Information About Your Visit        [employer] Information     el? gives you secure access to your electronic health record. If you see a primary care provider, you can also send messages to your care team and make appointments. If you have questions, please call your primary care clinic.  If you do not have a primary care provider, please call [phone] and they will assist you.        Care EveryWhere ID     This is your Care EveryWhere ID. This could be used by other organizations to access your [facility] medical records  [id number]        Your Vitals Were     Pulse Temperature Height Pulse Oximetry BMI (Body Mass Index)       61 98.2  F (36.8  C) (Tympanic) 5' 3.5\" (1.613 m) 99% 19.91 kg/m2        Blood Pressure from Last 3 Encounters:   04/20/17 133/69   03/28/17 116/60   02/06/17 131/70    Weight from Last 3 Encounters:   04/20/17 114 lb 3.2 oz (51.8 kg)   03/28/17 115 lb 6.4 oz (52.3 kg)   02/06/17 110 lb 6.4 oz (50.1 kg)              We Performed the Following     HC REMOVAL IMPACTED CERUMEN IRRIGATION/LVG UNILAT     HC REMOVAL IMPACTED CERUMEN IRRIGATION/LVG UNILAT          Today's Medication Changes          These changes are accurate as of: 4/20/17  9:30 AM.  If you have any questions, ask your nurse or doctor.               Start taking these medicines.        Dose/Directions    fluticasone 50 MCG/ACT spray   Commonly known as:  FLONASE   Used for:  Seasonal allergic rhinitis, unspecified allergic rhinitis trigger   Started by:  Brian Perez MD        Dose:  1-2 spray   Spray 1-2 sprays into both nostrils daily   Quantity:  16 g   Refills:  1       predniSONE 20 MG tablet   Commonly known as:  DELTASONE "   Used for:  Wheezing   Started by:  Brian Perez MD        Dose:  40 mg   Take 2 tablets (40 mg) by mouth daily for 5 days   Quantity:  10 tablet   Refills:  0            Where to get your medicines      These medications were sent to [facility] [address] [facility]  [address] [facility] [address]     Phone:  [phone]     fluticasone 50 MCG/ACT spray    predniSONE 20 MG tablet                Primary Care Provider Office Phone # Fax #    Brian Perez [phone]       [facility]        [address] [facility] [address]        Thank you!     Thank you for choosing [facility]  for your care. Our goal is always to provide you with excellent care. Hearing back from our patients is one way we can continue to improve our services. Please take a few minutes to complete the written survey that you may receive in the mail after your visit with us. Thank you!             Your Updated Medication List - Protect others around you: Learn how to safely use, store and throw away your medicines at www.disposemymeds.org.          This list is accurate as of: 4/20/17  9:30 AM.  Always use your most recent med list.                   Brand Name Dispense Instructions for use    albuterol 108 (90 BASE) MCG/ACT Inhaler    albuterol    1 Inhaler    Inhale 2 puffs into the lungs every 4 hours as needed for shortness of breath / dyspnea Rinse mouthpiece weekly.       amLODIPine 5 MG tablet    NORVASC    90 tablet    Take 1 tablet (5 mg) by mouth daily       aspirin 325 MG EC tablet      Take by mouth daily       atorvastatin 20 MG tablet    LIPITOR    90 tablet    Take 1 tablet (20 mg) by mouth daily       B-12 1000 MCG Tbcr     100 tablet    Take 1,000 mcg by mouth daily       benzonatate 100 MG capsule    TESSALON    21 capsule    Take 1 capsule (100 mg) by mouth 3 times daily as needed for cough       clopidogrel  75 MG tablet    PLAVIX    90 tablet    Take 1 tablet (75 mg) by mouth daily       fluticasone 50 MCG/ACT spray    FLONASE    16 g    Spray 1-2 sprays into both nostrils daily       lisinopril 2.5 MG tablet    PRINIVIL/Zestril    90 tablet    Take 1 tablet (2.5 mg) by mouth daily       metoprolol 25 MG 24 hr tablet    TOPROL-XL    90 tablet    Take 1 tablet (25 mg) by mouth daily       nitroglycerin 0.4 MG sublingual tablet    NITROSTAT    25 tablet    Place 1 tablet (0.4 mg) under the tongue every 5 minutes as needed for chest pain for chest pain       predniSONE 20 MG tablet    DELTASONE    10 tablet    Take 2 tablets (40 mg) by mouth daily for 5 days       ranitidine 150 MG tablet    ZANTAC    90 tablet    Take 1 tablet (150 mg) by mouth daily as needed for heartburn

## 2017-04-20 NOTE — NURSING NOTE
"Chief Complaint   Patient presents with     Cough     Pt has had cough on and off for past couple months.  Was tx for bronchitis 2/6/17, cough never went away.       Initial /69  Pulse 61  Temp 98.2  F (36.8  C) (Tympanic)  Ht 5' 3.5\" (1.613 m)  Wt 114 lb 3.2 oz (51.8 kg)  BMI 19.91 kg/m2 Estimated body mass index is 19.91 kg/(m^2) as calculated from the following:    Height as of this encounter: 5' 3.5\" (1.613 m).    Weight as of this encounter: 114 lb 3.2 oz (51.8 kg).  Medication Reconciliation: complete  Laquita Umanzor CMA    "

## 2017-04-20 NOTE — PATIENT INSTRUCTIONS
Your symptoms are due to bronchospasm or tightening of the airways due to recent respiratory infection or from allergies.  Prednisone is started to decrease inflammation.  Use albuterol inhaler as prescribed previously.  Drink plenty of oral fluids.  If no improvement after 1 week, see doctor again.  If with respiratory distress, go to ER.    Earwax  Ear irrigation performed successfully today.  Do not to probe ear canal with any foreign object to prevent impaction or eardruum perforation. If with more symptoms or new symptoms, return to clinic.  You may consider having your ears examined and possibly flushed in clinic at least once a year or as needed.

## 2017-04-20 NOTE — PROGRESS NOTES
SUBJECTIVE:                                                    vEin Melgar is a 82 year old male who presents to clinic today for the following health issues:  Chief Complaint   Patient presents with     Cough     Pt has had cough on and off for past couple months.  Was tx for bronchitis 2/6/17, cough never went away.         ENT Symptoms             Symptoms: cc Present Absent Comment   Fever/Chills   x    Fatigue  x     Muscle Aches   x    Eye Irritation   x    Sneezing   x    Nasal Simba/Drg  x  drainage   Sinus Pressure/Pain   x    Loss of smell  x     Dental pain   x    Sore Throat   x    Swollen Glands   x    Ear Pain/Fullness   x    Cough x      Wheeze x      Chest Pain x   Pain in right rib area   Shortness of breath x      Rash   x    Other         Symptom duration:  past couple months   Symptom severity:  severe with coughing episodes   Treatments tried:  zithromax, cough suppressant, inhaler   Contacts: wife has also had      Verified above history with patient and spouse.      Problem list and histories reviewed & adjusted, as indicated.  Additional history: as documented    Patient Active Problem List   Diagnosis     Hypertension     Mild persistent asthma without complication     CAD (coronary artery disease)     Tubular adenoma     CKD (chronic kidney disease), stage III     GERD (gastroesophageal reflux disease)     Hyperlipidemia LDL goal <100     Advanced directives, counseling/discussion     OAB (overactive bladder)     Microscopic hematuria     Memory deficit     Health Care Home     COPD, mild (H)     Pain of left upper arm     Past Surgical History:   Procedure Laterality Date     ANGIOGRAM  10/07    coronary=mild, diffuse CAD; stent below patent     ANGIOPLASTY  9/07    stent to ostia of LAD     SURGICAL HISTORY OF -       Appendectomy       Social History   Substance Use Topics     Smoking status: Former Smoker     Quit date: 1/1/1949     Smokeless tobacco: Never Used     Alcohol use Yes       Comment: occasionally     Family History   Problem Relation Age of Onset     CEREBROVASCULAR DISEASE Mother      Lipids Mother      DIABETES Father      CEREBROVASCULAR DISEASE Father      DIABETES Brother      HEART DISEASE Brother          Current Outpatient Prescriptions   Medication Sig Dispense Refill     predniSONE (DELTASONE) 20 MG tablet Take 2 tablets (40 mg) by mouth daily for 5 days 10 tablet 0     fluticasone (FLONASE) 50 MCG/ACT spray Spray 1-2 sprays into both nostrils daily 16 g 1     amLODIPine (NORVASC) 5 MG tablet Take 1 tablet (5 mg) by mouth daily 90 tablet 3     Cyanocobalamin (B-12) 1000 MCG TBCR Take 1,000 mcg by mouth daily 100 tablet 1     albuterol (ALBUTEROL) 108 (90 BASE) MCG/ACT Inhaler Inhale 2 puffs into the lungs every 4 hours as needed for shortness of breath / dyspnea Rinse mouthpiece weekly. 1 Inhaler 11     aspirin 325 MG EC tablet Take by mouth daily       ranitidine (ZANTAC) 150 MG tablet Take 1 tablet (150 mg) by mouth daily as needed for heartburn 90 tablet 2     lisinopril (PRINIVIL,ZESTRIL) 2.5 MG tablet Take 1 tablet (2.5 mg) by mouth daily 90 tablet 3     atorvastatin (LIPITOR) 20 MG tablet Take 1 tablet (20 mg) by mouth daily 90 tablet 3     metoprolol (TOPROL-XL) 25 MG 24 hr tablet Take 1 tablet (25 mg) by mouth daily 90 tablet 3     clopidogrel (PLAVIX) 75 MG tablet Take 1 tablet (75 mg) by mouth daily 90 tablet 3     benzonatate (TESSALON) 100 MG capsule Take 1 capsule (100 mg) by mouth 3 times daily as needed for cough (Patient not taking: Reported on 4/20/2017) 21 capsule 0     nitroglycerin (NITROSTAT) 0.4 MG SL tablet Place 1 tablet (0.4 mg) under the tongue every 5 minutes as needed for chest pain for chest pain 25 tablet 3     No Known Allergies    Reviewed and updated as needed this visit by clinical staff  Tobacco  Allergies  Meds  Problems  Med Hx  Surg Hx  Fam Hx  Soc Hx        Reviewed and updated as needed this visit by Provider  Allergies   "Meds  Problems         ROS:  C: NEGATIVE for fever, chills, change in weight  I: NEGATIVE for worrisome rashes, moles or lesions  E: NEGATIVE for vision changes or irritation  ENT/MOUTH: see above  RESP:as above  CV: NEGATIVE for chest pain, palpitations or peripheral edema  GI: NEGATIVE for nausea, abdominal pain, heartburn, or change in bowel habits  M: NEGATIVE for significant arthralgias or myalgia    OBJECTIVE:                                                    /69  Pulse 61  Temp 98.2  F (36.8  C) (Tympanic)  Ht 5' 3.5\" (1.613 m)  Wt 114 lb 3.2 oz (51.8 kg)  SpO2 99%  BMI 19.91 kg/m2  Body mass index is 19.91 kg/(m^2).  GENERAL:  alert and no distress  EYES: pink conjunctivae, no icterus  NECK: mild cervical adenopathy, nontender  HEENT: EAM with impacted cerumen bilaterally obstructing tympanic membrane visualization, nose with mild congestion and pale swollen turbintates and clear rhinorrhea, no sinus tenderness, throat mildly erythematous, no exudates, no oral ulcers  RESP: fair air entry, mild end-expiratory wheezing bilaterally, no crackles/rales  CV: regular rates and rhythm, normal S1 S2, no S3 or S4 and no murmur  SKIN:  Good turgor, no rashes    Diagnostic test results:  Diagnostic Test Results:  none      ASSESSMENT/PLAN:                                                        ICD-10-CM    1. Wheezing R06.2 predniSONE (DELTASONE) 20 MG tablet  No distress.  Discussed causes, course and treatment.  Patient may use albuterol as previously prescribed.  Advised return precautions.     2. Seasonal allergic rhinitis, unspecified allergic rhinitis trigger J30.2 fluticasone (FLONASE) 50 MCG/ACT spray  Discussed findings on exam, and cause and course of condition.  Discussed treatment.  Patient agreed to use flonase daily.     3. Bilateral impacted cerumen H61.23 HC REMOVAL IMPACTED CERUMEN IRRIGATION/LVG UNILAT     HC REMOVAL IMPACTED CERUMEN IRRIGATION/LVG UNILAT  Due to bilateral impacted " cerumen causing decreased hearing, removal recommended. Patient and spouse agreed to aural irrigation.  Successful irrigation of both ears using warm tap water performed by Sally Joiner CMA.  Routine ear care given.       Follow up with Provider - 1 week if not improving   Patient Instructions   Your symptoms are due to bronchospasm or tightening of the airways due to recent respiratory infection or from allergies.  Prednisone is started to decrease inflammation.  Use albuterol inhaler as prescribed previously.  Drink plenty of oral fluids.  If no improvement after 1 week, see doctor again.  If with respiratory distress, go to ER.    Earwax  Ear irrigation performed successfully today.  Do not to probe ear canal with any foreign object to prevent impaction or eardruum perforation. If with more symptoms or new symptoms, return to clinic.  You may consider having your ears examined and possibly flushed in clinic at least once a year or as needed.      Brian Perez MD  [facility]

## 2017-05-01 ENCOUNTER — CARE COORDINATION (OUTPATIENT)
Dept: CASE MANAGEMENT | Facility: CLINIC | Age: 82
End: 2017-05-01

## 2017-05-01 NOTE — PROGRESS NOTES
Clinic Care Coordination Contact  [facility]/Voicemail    Referral Source: PCP  Clinical Data: Care Coordinator Outreach  Outreach attempted x 1.  Left message on voicemail with call back information and requested return call.  Plan:  Care Coordinator will try to reach patient again in 3-5 business days.    GUIDO Adams  [facility] Care Coordinator  [phone]

## 2017-05-05 NOTE — PROGRESS NOTES
Clinic Care Coordination Contact  OUTREACH    Pt reports that he received the resources that were sent, but didn't have a chance to follow-up on any of them Pt reports he would like information on transportation so that he can go places when she is working or out of the house. [facility] will mail out resources follow-up in 2-3 weeks.    GUIDO Adams  [facility] Care Coordinator  [phone]

## 2017-06-08 ENCOUNTER — OFFICE VISIT (OUTPATIENT)
Dept: FAMILY MEDICINE | Facility: CLINIC | Age: 82
End: 2017-06-08
Payer: COMMERCIAL

## 2017-06-08 VITALS
SYSTOLIC BLOOD PRESSURE: 135 MMHG | WEIGHT: 115.4 LBS | HEIGHT: 64 IN | TEMPERATURE: 98.5 F | BODY MASS INDEX: 19.7 KG/M2 | HEART RATE: 66 BPM | DIASTOLIC BLOOD PRESSURE: 77 MMHG | OXYGEN SATURATION: 97 %

## 2017-06-08 DIAGNOSIS — R35.1 NOCTURIA: ICD-10-CM

## 2017-06-08 DIAGNOSIS — M25.511 RIGHT SHOULDER PAIN, UNSPECIFIED CHRONICITY: Primary | ICD-10-CM

## 2017-06-08 DIAGNOSIS — J45.20 MILD INTERMITTENT ASTHMA WITHOUT COMPLICATION: ICD-10-CM

## 2017-06-08 PROCEDURE — 99214 OFFICE O/P EST MOD 30 MIN: CPT | Performed by: FAMILY MEDICINE

## 2017-06-08 RX ORDER — TAMSULOSIN HYDROCHLORIDE 0.4 MG/1
0.4 CAPSULE ORAL DAILY
Qty: 30 CAPSULE | Refills: 1 | Status: SHIPPED | OUTPATIENT
Start: 2017-06-08 | End: 2017-07-19

## 2017-06-08 NOTE — LETTER
My Asthma Action Plan  Name: Evin Melgar   YOB: 1934  Date: 6/8/2017   My doctor: Brian Perez MD   My clinic: [facility]        My Control Medicine: none  My Rescue Medicine: Albuterol (Proair/Ventolin/Proventil) inhaler 2 puffs inhaled every 4 hrs as needed for wheezing   My Asthma Severity: intermittent  Avoid your asthma triggers: upper respiratory infections and humidity               GREEN ZONE     Good Control    I feel good    No cough or wheeze    Can work, sleep and play without asthma symptoms       Take your asthma control medicine every day.     1. If exercise triggers your asthma, take your rescue medication    15 minutes before exercise or sports, and    During exercise if you have asthma symptoms  2. Spacer to use with inhaler: If you have a spacer, make sure to use it with your inhaler             YELLOW ZONE     Getting Worse  I have ANY of these:    I do not feel good    Cough or wheeze    Chest feels tight    Wake up at night   1. Keep taking your Green Zone medications  2. Start taking your rescue medicine:    every 20 minutes for up to 1 hour. Then every 4 hours for 24-48 hours.  3. If you stay in the Yellow Zone for more than 12-24 hours, contact your doctor.  4. If you do not return to the Green Zone in 12-24 hours or you get worse, start taking your oral steroid medicine if prescribed by your provider.           RED ZONE     Medical Alert - Get Help  I have ANY of these:    I feel awful    Medicine is not helping    Breathing getting harder    Trouble walking or talking    Nose opens wide to breathe       1. Take your rescue medicine NOW  2. If your provider has prescribed an oral steroid medicine, start taking it NOW  3. Call your doctor NOW  4. If you are still in the Red Zone after 20 minutes and you have not reached your doctor:    Take your rescue medicine again and    Call 911 or go to the emergency room right away    See your regular doctor  within 2 weeks of an Emergency Room or Urgent Care visit for follow-up treatment.        Electronically signed by: Brian Perez, June 8, 2017    Annual Reminders:  Meet with Asthma Educator,  Flu Shot in the Fall, consider Pneumonia Vaccination for patients with asthma (aged 19 and older).    Pharmacy:    [facility], [place] - [address] [facility] - [place], [place] - [address] [address]                    Asthma Triggers  How To Control Things That Make Your Asthma Worse    Triggers are things that make your asthma worse.  Look at the list below to help you find your triggers and what you can do about them.  You can help prevent asthma flare-ups by staying away from your triggers.      Trigger                                                          What you can do   Cigarette Smoke  Tobacco smoke can make asthma worse. Do not allow smoking in your home, car or around you.  Be sure no one smokes at a child s day care or school.  If you smoke, ask your health care provider for ways to help you quit.  Ask family members to quit too.  Ask your health care provider for a referral to Quit Plan to help you quit smoking, or call 5-987-857-PLAN.     Colds, Flu, Bronchitis  These are common triggers of asthma. Wash your hands often.  Don t touch your eyes, nose or mouth.  Get a flu shot every year.     Dust Mites  These are tiny bugs that live in cloth or carpet. They are too small to see. Wash sheets and blankets in hot water every week.   Encase pillows and mattress in dust mite proof covers.  Avoid having carpet if you can. If you have carpet, vacuum weekly.   Use a dust mask and HEPA vacuum.   Pollen and Outdoor Mold  Some people are allergic to trees, grass, or weed pollen, or molds. Try to keep your windows closed.  Limit time out doors when pollen count is high.   Ask you health care provider about taking medicine during allergy season.     Animal Dander  Some people are allergic to skin  flakes, urine or saliva from pets with fur or feathers. Keep pets with fur or feathers out of your home.    If you can t keep the pet outdoors, then keep the pet out of your bedroom.  Keep the bedroom door closed.  Keep pets off cloth furniture and away from stuffed toys.     Mice, Rats, and Cockroaches  Some people are allergic to the waste from these pests.   Cover food and garbage.  Clean up spills and food crumbs.  Store grease in the refrigerator.   Keep food out of the bedroom.   Indoor Mold  This can be a trigger if your home has high moisture. Fix leaking faucets, pipes, or other sources of water.   Clean moldy surfaces.  Dehumidify basement if it is damp and smelly.   Smoke, Strong Odors, and Sprays  These can reduce air quality. Stay away from strong odors and sprays, such as perfume, powder, hair spray, paints, smoke incense, paint, cleaning products, candles and new carpet.   Exercise or Sports  Some people with asthma have this trigger. Be active!  Ask your doctor about taking medicine before sports or exercise to prevent symptoms.    Warm up for 5-10 minutes before and after sports or exercise.     Other Triggers of Asthma  Cold air:  Cover your nose and mouth with a scarf.  Sometimes laughing or crying can be a trigger.  Some medicines and food can trigger asthma.

## 2017-06-08 NOTE — NURSING NOTE
"Chief Complaint   Patient presents with     Musculoskeletal Problem     Pt has been having some right shoulder pain for the past month, sometimes on left side also.       Initial /77  Pulse 66  Temp 98.5  F (36.9  C) (Tympanic)  Ht 5' 3.5\" (1.613 m)  Wt 115 lb 6.4 oz (52.3 kg)  BMI 20.12 kg/m2 Estimated body mass index is 20.12 kg/(m^2) as calculated from the following:    Height as of this encounter: 5' 3.5\" (1.613 m).    Weight as of this encounter: 115 lb 6.4 oz (52.3 kg).  Medication Reconciliation: complete  Laquita Umanzor CMA    "

## 2017-06-08 NOTE — PATIENT INSTRUCTIONS
Shoulder pain more consistent with degenerative joint or arthritis of advancing age.  Since intermittent, will defer imaging at this time.  May take Acetaminophen 325 mg orally 1-2 tabs every 4-6 hrs as needed for pain.  If more constant pain, see provider again.    Urinary frequency at night may be from partial obstruction of urine flow from possible mild prostate enlargement.  Start Flomax 0.4 mg daily.  In 3-4 weeks update clinic of your symptoms..  If relieved, will continue mediation indefinitely.  If with difficulty urinating or worsening symptoms, see provider again.  No oral fluids 2 hrs prior to bedtime.

## 2017-06-08 NOTE — PROGRESS NOTES
SUBJECTIVE:                                                    Evin Melgar is a 83 year old male who presents to clinic today for the following health issues:  Chief Complaint   Patient presents with     Musculoskeletal Problem     Pt has been having some right shoulder pain for the past month, sometimes on left side also.         Joint Pain     Onset: 1 month ago    Description:   Location: left shoulder on occasion and right shoulder all the time.   Character: Dull ache  Patient states pain is not present today.  Patient states when pain is present, it lasts 2 minutes, and is relieved by Aspirin or a heartburn medication.    Intensity: 5/10    Progression of Symptoms: same    Accompanying Signs & Symptoms:  Other symptoms: none   History:   Previous similar pain: no       Precipitating factors:   Trauma or overuse: no     Alleviating factors:  Improved by: nothing       Therapies Tried and outcome: none  Verified above history with patient.  Patient states first time he felt it he was just laying in bed. He cannot remember if he did anything different that day.    Patient reports nocturia several times a night; patient cannot quantify duration but wife states that has been a long time.  Patient denies dysuria.  Wife states in daytime he goes to urinate about every 2 hrs or so.      Problem list and histories reviewed & adjusted, as indicated.  Additional history: as documented    Patient Active Problem List   Diagnosis     Hypertension     Mild persistent asthma without complication     CAD (coronary artery disease)     Tubular adenoma     CKD (chronic kidney disease), stage III     GERD (gastroesophageal reflux disease)     Hyperlipidemia LDL goal <100     Advanced directives, counseling/discussion     OAB (overactive bladder)     Microscopic hematuria     Memory deficit     Health Care Home     COPD, mild (H)     Pain of left upper arm     Nocturia     Past Surgical History:   Procedure Laterality Date      ANGIOGRAM  10/07    coronary=mild, diffuse CAD; stent below patent     ANGIOPLASTY  9/07    stent to ostia of LAD     SURGICAL HISTORY OF -       Appendectomy       Social History   Substance Use Topics     Smoking status: Former Smoker     Quit date: 1/1/1949     Smokeless tobacco: Never Used     Alcohol use Yes      Comment: occasionally     Family History   Problem Relation Age of Onset     CEREBROVASCULAR DISEASE Mother      Lipids Mother      DIABETES Father      CEREBROVASCULAR DISEASE Father      DIABETES Brother      HEART DISEASE Brother          Current Outpatient Prescriptions   Medication Sig Dispense Refill     tamsulosin (FLOMAX) 0.4 MG capsule Take 1 capsule (0.4 mg) by mouth daily 30 capsule 1     fluticasone (FLONASE) 50 MCG/ACT spray Spray 1-2 sprays into both nostrils daily 16 g 1     amLODIPine (NORVASC) 5 MG tablet Take 1 tablet (5 mg) by mouth daily 90 tablet 3     Cyanocobalamin (B-12) 1000 MCG TBCR Take 1,000 mcg by mouth daily 100 tablet 1     albuterol (ALBUTEROL) 108 (90 BASE) MCG/ACT Inhaler Inhale 2 puffs into the lungs every 4 hours as needed for shortness of breath / dyspnea Rinse mouthpiece weekly. 1 Inhaler 11     aspirin 325 MG EC tablet Take by mouth daily       ranitidine (ZANTAC) 150 MG tablet Take 1 tablet (150 mg) by mouth daily as needed for heartburn 90 tablet 2     lisinopril (PRINIVIL,ZESTRIL) 2.5 MG tablet Take 1 tablet (2.5 mg) by mouth daily 90 tablet 3     atorvastatin (LIPITOR) 20 MG tablet Take 1 tablet (20 mg) by mouth daily 90 tablet 3     metoprolol (TOPROL-XL) 25 MG 24 hr tablet Take 1 tablet (25 mg) by mouth daily 90 tablet 3     clopidogrel (PLAVIX) 75 MG tablet Take 1 tablet (75 mg) by mouth daily 90 tablet 3     nitroglycerin (NITROSTAT) 0.4 MG SL tablet Place 1 tablet (0.4 mg) under the tongue every 5 minutes as needed for chest pain for chest pain 25 tablet 3     No Known Allergies    Reviewed and updated as needed this visit by clinical staff  Tobacco   "Allergies  Meds  Problems  Med Hx  Surg Hx  Fam Hx  Soc Hx        Reviewed and updated as needed this visit by Provider  Allergies  Meds  Problems         ROS:  C: NEGATIVE for fever, chills, change in weight  I: NEGATIVE for worrisome rashes, moles or lesions  E/M: NEGATIVE for ear, mouth and throat problems  R: NEGATIVE for significant cough or SOB  CV: NEGATIVE for chest pain, palpitations or peripheral edema  GI: NEGATIVE for nausea, abdominal pain, heartburn, or change in bowel habits   male :see above  MUSCULOSKELETAL:see above  N: NEGATIVE for weakness, dizziness or paresthesias  H: NEGATIVE for bleeding problems    OBJECTIVE:                                                    /77  Pulse 66  Temp 98.5  F (36.9  C) (Tympanic)  Ht 5' 3.5\" (1.613 m)  Wt 115 lb 6.4 oz (52.3 kg)  SpO2 97%  BMI 20.12 kg/m2  Body mass index is 20.12 kg/(m^2).  GENERAL:  alert and no distress, ambulatory w/o assist  NECK: no tenderness, no adenopathy,  Thyroid not enlarged  RESP: lungs clear to auscultation - no rales, no rhonchi, no wheezes  CV: regular rates and rhythm, no murmur  MS: no edema  RIGHT SHOULDER: no atrophy/swelling/discoloration; non-tender to palpation; full range of motion  w/o pain but with palpable crepitations intemittently  LEFT SHOULDER: no atrophy/swelling/discoloration; non-tender to palpation; full range of motion  w/o pain  SKIN: no suspicious lesions, no rashes  NEURO: strength and tone- normal, sensory exam- grossly normal, mentation- intact, speech- normal, reflexes- symmetric  ABD:  nontender  DIRECT RECTAL EXAM: good sphincter tone, intact vault, prostate not enlarged, no mass to reach of exam finger, no blood per exam glove.  Diagnostic test results:  Diagnostic Test Results:  none      ASSESSMENT/PLAN:                                                        ICD-10-CM    1. Right shoulder pain, unspecified chronicity M25.511 No acute joint or limitation of function.  Possibly " mild degenerative joint disease vs mild tendinosis. No tenderness today and no limiation of movement or activity.  Conservative treatment.  Avoid prolonged repetitive movement.  Acetaminophen 325 mg orally 1-2 tabs every 4-6 hrs as needed for pain  Consider physical therapy if range of motion decreasing.  If persistent pain, consider imaging.     2. Nocturia R35.1 tamsulosin (FLOMAX) 0.4 MG capsule  Discussed with patient and spouse possible causes.  No acute obstrucion but may have partial.  They both agreed to trial of tamsulosin.  If with more obstructive signs, consider urology consult.'     3. Mild intermittent asthma without complication J45.20 Asthma Action Plan (AAP)       Follow up with Provider - prn   Patient Instructions   Shoulder pain more consistent with degenerative joint or arthritis of advancing age.  Since intermittent, will defer imaging at this time.  May take Acetaminophen 325 mg orally 1-2 tabs every 4-6 hrs as needed for pain.  If more constant pain, see provider again.    Urinary frequency at night may be from partial obstruction of urine flow from possible mild prostate enlargement.  Start Flomax 0.4 mg daily.  In 3-4 weeks update clinic of your symptoms..  If relieved, will continue mediation indefinitely.  If with difficulty urinating or worsening symptoms, see provider again.  No oral fluids 2 hrs prior to bedtime.        Brian Perez MD  [facility]

## 2017-06-08 NOTE — MR AVS SNAPSHOT
After Visit Summary   6/8/2017    Evin Melgar    MRN: [id number]           Patient Information     Date Of Birth          6/1/1934        Visit Information        Provider Department      6/8/2017 9:40 AM Brian Perez MD [facility]        Today's Diagnoses     Right shoulder pain, unspecified chronicity    -  1    Nocturia        Mild intermittent asthma without complication          Care Instructions    Shoulder pain more consistent with degenerative joint or arthritis of advancing age.  Since intermittent, will defer imaging at this time.  May take Acetaminophen 325 mg orally 1-2 tabs every 4-6 hrs as needed for pain.  If more constant pain, see provider again.    Urinary frequency at night may be from partial obstruction of urine flow from possible mild prostate enlargement.  Start Flomax 0.4 mg daily.  In 3-4 weeks update clinic of your symptoms..  If relieved, will continue mediation indefinitely.  If with difficulty urinating or worsening symptoms, see provider again.  No oral fluids 2 hrs prior to bedtime.            Follow-ups after your visit        Your next 10 appointments already scheduled     Jul 21, 2017  7:30 AM CDT   LAB with [place] [facility]   [facility]    [address] [address]   [phone]           Patient must bring picture ID.  Patient should be prepared to give a urine specimen  Please do not eat 10-12 hours before your appointment if you are coming in fasting for labs on lipids, cholesterol, or glucose (sugar).  Pregnant women should follow their Care Team instructions. Water with medications is okay. Do not drink coffee or other fluids.   If you have concerns about taking  your medications, please ask at office or if scheduling via [place] [facility], send a message by clicking on Secure Messaging, Message Your Care Team.            Jul 21, 2017  7:45 AM CDT   Ech Complete with WILLIAM Nova  [facility] ([facility])    [address] [address]   [phone]           1.  Please bring or wear a comfortable two-piece outfit. 2.  You may eat, drink and take your normal medicines. 3.  For any questions that cannot be answered, please contact the ordering physician            Jul 26, 2017 10:00 AM CDT   Return Visit with Say Fisher MD   [facility] AT [facility] ([facility])    [address] [address] [phone]            Sep 28, 2017  8:00 AM CDT   [facility] Visit with Katherine Lopez MD   [facility]    [address] [address] [phone]              Who to contact     If you have questions or need follow up information about today's clinic visit or your schedule please contact [facility] directly at [phone].  Normal or non-critical lab and imaging results will be communicated to you by MyChart, letter or phone within 4 business days after the clinic has received the results. If you do not hear from us within 7 days, please contact the clinic through [employer] or phone. If you have a critical or abnormal lab result, we will notify you by phone as soon as possible.  Submit refill requests through [employer] or call your pharmacy and they will forward the refill request to us. Please allow 3 business days for your refill to be completed.          Additional Information About Your Visit        kidthinghart Information     [employer] gives you secure access to your electronic health record. If you see a primary care provider, you can also send messages to your care team and make appointments. If you have questions, please call your primary care clinic.  If you do not have a primary care provider, please call [phone] and they will assist you.        Care EveryWhere ID     This is your Care EveryWhere ID. This  "could be used by other organizations to access your [place] medical records  [id number]        Your Vitals Were     Pulse Temperature Height Pulse Oximetry BMI (Body Mass Index)       66 98.5  F (36.9  C) (Tympanic) 5' 3.5\" (1.613 m) 97% 20.12 kg/m2        Blood Pressure from Last 3 Encounters:   06/08/17 135/77   04/20/17 133/69   03/28/17 116/60    Weight from Last 3 Encounters:   06/08/17 115 lb 6.4 oz (52.3 kg)   04/20/17 114 lb 3.2 oz (51.8 kg)   03/28/17 115 lb 6.4 oz (52.3 kg)              We Performed the Following     Asthma Action Plan (AAP)          Today's Medication Changes          These changes are accurate as of: 6/8/17 10:33 AM.  If you have any questions, ask your nurse or doctor.               Start taking these medicines.        Dose/Directions    tamsulosin 0.4 MG capsule   Commonly known as:  FLOMAX   Used for:  Nocturia   Started by:  Brian Perez MD        Dose:  0.4 mg   Take 1 capsule (0.4 mg) by mouth daily   Quantity:  30 capsule   Refills:  1            Where to get your medicines      These medications were sent to [address] [address]     Phone:  [phone]     tamsulosin 0.4 MG capsule                Primary Care Provider Office Phone # Fax #    Brian Perez [phone]       [facility]        [address] [address]        Thank you!     Thank you for choosing [facility]  for your care. Our goal is always to provide you with excellent care. Hearing back from our patients is one way we can continue to improve our services. Please take a few minutes to complete the written survey that you may receive in the mail after your visit with us. Thank you!             Your Updated Medication List - Protect others around you: Learn how to safely use, store and throw away your medicines at www.disposemymeds.org.       "    This list is accurate as of: 6/8/17 10:33 AM.  Always use your most recent med list.                   Brand Name Dispense Instructions for use    albuterol 108 (90 BASE) MCG/ACT Inhaler    albuterol    1 Inhaler    Inhale 2 puffs into the lungs every 4 hours as needed for shortness of breath / dyspnea Rinse mouthpiece weekly.       amLODIPine 5 MG tablet    NORVASC    90 tablet    Take 1 tablet (5 mg) by mouth daily       aspirin 325 MG EC tablet      Take by mouth daily       atorvastatin 20 MG tablet    LIPITOR    90 tablet    Take 1 tablet (20 mg) by mouth daily       B-12 1000 MCG Tbcr     100 tablet    Take 1,000 mcg by mouth daily       clopidogrel 75 MG tablet    PLAVIX    90 tablet    Take 1 tablet (75 mg) by mouth daily       fluticasone 50 MCG/ACT spray    FLONASE    16 g    Spray 1-2 sprays into both nostrils daily       lisinopril 2.5 MG tablet    PRINIVIL/Zestril    90 tablet    Take 1 tablet (2.5 mg) by mouth daily       metoprolol 25 MG 24 hr tablet    TOPROL-XL    90 tablet    Take 1 tablet (25 mg) by mouth daily       nitroglycerin 0.4 MG sublingual tablet    NITROSTAT    25 tablet    Place 1 tablet (0.4 mg) under the tongue every 5 minutes as needed for chest pain for chest pain       ranitidine 150 MG tablet    ZANTAC    90 tablet    Take 1 tablet (150 mg) by mouth daily as needed for heartburn       tamsulosin 0.4 MG capsule    FLOMAX    30 capsule    Take 1 capsule (0.4 mg) by mouth daily

## 2017-06-09 ASSESSMENT — ASTHMA QUESTIONNAIRES: ACT_TOTALSCORE: 17

## 2017-06-15 ENCOUNTER — OFFICE VISIT (OUTPATIENT)
Dept: FAMILY MEDICINE | Facility: CLINIC | Age: 82
End: 2017-06-15
Payer: COMMERCIAL

## 2017-06-15 VITALS
SYSTOLIC BLOOD PRESSURE: 120 MMHG | TEMPERATURE: 98.8 F | WEIGHT: 115 LBS | HEART RATE: 68 BPM | DIASTOLIC BLOOD PRESSURE: 67 MMHG | HEIGHT: 64 IN | OXYGEN SATURATION: 98 % | BODY MASS INDEX: 19.63 KG/M2

## 2017-06-15 DIAGNOSIS — S70.362A: Primary | ICD-10-CM
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
## 2017-06-15 PROCEDURE — 99213 OFFICE O/P EST LOW 20 MIN: CPT | Performed by: FAMILY MEDICINE

## 2017-06-15 PROCEDURE — 36415 COLL VENOUS BLD VENIPUNCTURE: CPT | Performed by: FAMILY MEDICINE

## 2017-06-15 PROCEDURE — 86618 LYME DISEASE ANTIBODY: CPT | Performed by: FAMILY MEDICINE

## 2017-06-15 RX ORDER — FLUTICASONE PROPIONATE 50 MCG
SPRAY, SUSPENSION (ML) NASAL
COMMUNITY
Start: 2017-04-20 | End: 2017-07-26

## 2017-06-15 RX ORDER — DOXYCYCLINE 100 MG/1
100 CAPSULE ORAL 2 TIMES DAILY
Qty: 14 CAPSULE | Refills: 0 | Status: SHIPPED | OUTPATIENT
Start: 2017-06-15 | End: 2017-06-22

## 2017-06-15 NOTE — PATIENT INSTRUCTIONS
After discussion today, you preferred to start antibiotics today, and test for lyme antibodies.  If negative test, antibiotics may be stopped after 1 week.  If positive test, will extend antibiotics to 2-3 week course.        Thank you for choosing [facility].  You may be receiving a survey in the mail from Jaimie Henry regarding your visit today.  Please take a few minutes to complete and return the survey to let us know how we are doing.      If you have questions or concerns, please contact us via [employer] or you can contact your care team at [phone].    Our Clinic hours are:  Monday 6:40 am  to 7:00 pm  Tuesday -Friday 6:40 am to 5:00 pm    The [place] outpatient lab hours are:  Monday - Friday 6:10 am to 4:45 pm  Saturdays 7:00 am to 11:00 am  Appointments are required, call [phone]    If you have clinical questions after hours or would like to schedule an appointment,  call the clinic at [phone].    Tick Bite (Antibiotic Treatment)    Ticks are small arachnids that feed on the blood of rodents, rabbits, birds, deer, dogs and humans. The bite may cause a local reaction like that of a spider, with a small amount of local redness, itching and slight swelling. Sometimes there is no local reaction.  Most tick bites are harmless. But some ticks carry diseases, such as Lyme disease or Yuriy [place] spotted fever. These can be passed to people at the time of the bite. Lyme disease is of greatest concern. Right now you have no symptoms of Lyme disease or other serious reaction to the bite. It is important to watch for the warning signs, which could appear weeks to months after the tick bite.  Home care  The following guidelines can help you care for your bite at home:    If itching is a problem, avoid tight clothing and anything that heats up your skin. This includes hot showers or baths and direct sunlight. This often makes the itching worse.    An ice pack will reduce local areas of redness and  itching. Make your own ice pack by putting ice cubes in a zip-top plastic bag and wrapping it in a thin towel. Over-the-counter creams containing benzocaine may help with itching.    You can use an antihistamine with diphenhydramine if your doctor did not give you another antihistamine. This medicine may be used to reduce itching if large areas of the skin are involved. It is available at drugstores and grocery stores. If symptoms continue, talk with your doctor or pharmacist about other over-the counter medicines that may be helpful.    Your doctor may prescribe antibiotics to reduce your risk of getting Lyme disease. It is very important that you take them exactly as directed until they are completely finished.  Follow-up care  Follow up with your healthcare provider, or as advised.  Call 911  Call 911 if any of these occur:    Irregular or rapid heartbeat    Numbness, tingling, or weakness in the arms or legs  When to seek medical advice  Call your healthcare provider right away if any of these occur:  Signs of local infection. Watch for these during the next few days.    Increasing redness around the bite site    Increased pain or swelling    Fever over 100.4 F (38.0 C), or as directed by your healthcare provider    Fluid draining from the bite area  Signs of tick-related disease. Watch for these over the next few weeks to months.    Circular, red, ring-like rash appears at the bite area within 1 to 3 weeks    Tiredness, fever or chills, nausea or vomiting    Neck pain or stiffness, headache, or confusion    Muscle or bone aches    Joint pain or swelling, especially in the knee    Weakness on one side of the face  Date Last Reviewed: 10/1/2016    6831-3087 [employer]. [address] [place] [address]. All rights reserved. This information is not intended as a substitute for professional medical care. Always follow your healthcare professional's instructions.

## 2017-06-15 NOTE — MR AVS SNAPSHOT
After Visit Summary   6/15/2017    Evin Melgar    MRN: [id number]           Patient Information     Date Of Birth          6/1/1934        Visit Information        Provider Department      6/15/2017 2:00 PM Brian Perez MD [facility]        Today's Diagnoses     Tick bite of thigh with local reaction, left, initial encounter    -  1      Care Instructions    After discussion today, you preferred to start antibiotics today, and test for lyme antibodies.  If negative test, antibiotics may be stopped after 1 week.  If positive test, will extend antibiotics to 2-3 week course.        Thank you for choosing [facility].  You may be receiving a survey in the mail from [employer] regarding your visit today.  Please take a few minutes to complete and return the survey to let us know how we are doing.      If you have questions or concerns, please contact us via [employer] or you can contact your care team at [phone].    Our Clinic hours are:  Monday 6:40 am  to 7:00 pm  Tuesday -Friday 6:40 am to 5:00 pm    The [place] outpatient lab hours are:  Monday - Friday 6:10 am to 4:45 pm  Saturdays 7:00 am to 11:00 am  Appointments are required, call [phone]    If you have clinical questions after hours or would like to schedule an appointment,  call the clinic at [phone].    Tick Bite (Antibiotic Treatment)    Ticks are small arachnids that feed on the blood of rodents, rabbits, birds, deer, dogs and humans. The bite may cause a local reaction like that of a spider, with a small amount of local redness, itching and slight swelling. Sometimes there is no local reaction.  Most tick bites are harmless. But some ticks carry diseases, such as Lyme disease or Yuriy Mountain spotted fever. These can be passed to people at the time of the bite. Lyme disease is of greatest concern. Right now you have no symptoms of Lyme disease or other serious reaction to the bite. It is  important to watch for the warning signs, which could appear weeks to months after the tick bite.  Home care  The following guidelines can help you care for your bite at home:    If itching is a problem, avoid tight clothing and anything that heats up your skin. This includes hot showers or baths and direct sunlight. This often makes the itching worse.    An ice pack will reduce local areas of redness and itching. Make your own ice pack by putting ice cubes in a zip-top plastic bag and wrapping it in a thin towel. Over-the-counter creams containing benzocaine may help with itching.    You can use an antihistamine with diphenhydramine if your doctor did not give you another antihistamine. This medicine may be used to reduce itching if large areas of the skin are involved. It is available at drugstores and grocery stores. If symptoms continue, talk with your doctor or pharmacist about other over-the counter medicines that may be helpful.    Your doctor may prescribe antibiotics to reduce your risk of getting Lyme disease. It is very important that you take them exactly as directed until they are completely finished.  Follow-up care  Follow up with your healthcare provider, or as advised.  Call 911  Call 911 if any of these occur:    Irregular or rapid heartbeat    Numbness, tingling, or weakness in the arms or legs  When to seek medical advice  Call your healthcare provider right away if any of these occur:  Signs of local infection. Watch for these during the next few days.    Increasing redness around the bite site    Increased pain or swelling    Fever over 100.4 F (38.0 C), or as directed by your healthcare provider    Fluid draining from the bite area  Signs of tick-related disease. Watch for these over the next few weeks to months.    Circular, red, ring-like rash appears at the bite area within 1 to 3 weeks    Tiredness, fever or chills, nausea or vomiting    Neck pain or stiffness, headache, or  confusion    Muscle or bone aches    Joint pain or swelling, especially in the knee    Weakness on one side of the face  Date Last Reviewed: 10/1/2016    2430-3671 [employer]. [address] [place] [address], [place] [address]. All rights reserved. This information is not intended as a substitute for professional medical care. Always follow your healthcare professional's instructions.                Follow-ups after your visit        Your next 10 appointments already scheduled     Jul 21, 2017  7:30 AM CDT   LAB with [facility] ([facility])    [address] [address]   [phone]           Patient must bring picture ID.  Patient should be prepared to give a urine specimen  Please do not eat 10-12 hours before your appointment if you are coming in fasting for labs on lipids, cholesterol, or glucose (sugar).  Pregnant women should follow their Care Team instructions. Water with medications is okay. Do not drink coffee or other fluids.   If you have concerns about taking  your medications, please ask at office or if scheduling via [employer], send a message by clicking on Secure Messaging, Message Your Care Team.            Jul 21, 2017  7:45 AM CDT   Ech Complete with [facility] [facility] ([facility])    [address] [facility] [address]   [phone]           1.  Please bring or wear a comfortable two-piece outfit. 2.  You may eat, drink and take your normal medicines. 3.  For any questions that cannot be answered, please contact the ordering physician            Jul 26, 2017 10:00 AM CDT   Return Visit with Say Fisher MD   [facility]YS[facility] AT [facility] ([facility])    [address] [address]   [phone]            Sep 28, 2017  8:00 AM CDT   Return Visit with Katherine Lopez MD   [facility]  "   [address]  [facility] [address]   [phone]              Who to contact     If you have questions or need follow up information about today's clinic visit or your schedule please contact [facility] directly at [phone].  Normal or non-critical lab and imaging results will be communicated to you by MyChart, letter or phone within 4 business days after the clinic has received the results. If you do not hear from us within 7 days, please contact the clinic through [employer] or phone. If you have a critical or abnormal lab result, we will notify you by phone as soon as possible.  Submit refill requests through [employer] or call your pharmacy and they will forward the refill request to us. Please allow 3 business days for your refill to be completed.          Additional Information About Your Visit        MyChart Information     [employer] gives you secure access to your electronic health record. If you see a primary care provider, you can also send messages to your care team and make appointments. If you have questions, please call your primary care clinic.  If you do not have a primary care provider, please call [phone] and they will assist you.        Care EveryWhere ID     This is your Care EveryWhere ID. This could be used by other organizations to access your [facility] medical records  [id number]        Your Vitals Were     Pulse Temperature Height Pulse Oximetry BMI (Body Mass Index)       68 98.8  F (37.1  C) (Tympanic) 5' 3.5\" (1.613 m) 98% 20.05 kg/m2        Blood Pressure from Last 3 Encounters:   06/15/17 120/67   06/08/17 135/77   04/20/17 133/69    Weight from Last 3 Encounters:   06/15/17 115 lb (52.2 kg)   06/08/17 115 lb 6.4 oz (52.3 kg)   04/20/17 114 lb 3.2 oz (51.8 kg)              We Performed the Following     **Lyme Disease Cristine with reflex to WB Serum FUTURE 14d          Today's Medication Changes          These changes are accurate as of: 6/15/17  2:22 PM.  If " you have any questions, ask your nurse or doctor.               Start taking these medicines.        Dose/Directions    doxycycline 100 MG capsule   Commonly known as:  VIBRAMYCIN   Used for:  Tick bite of thigh with local reaction, left, initial encounter   Started by:  Brian Perez MD        Dose:  100 mg   Take 1 capsule (100 mg) by mouth 2 times daily for 7 days   Quantity:  14 capsule   Refills:  0            Where to get your medicines      These medications were sent to [address] [address]     Phone:  [phone]     doxycycline 100 MG capsule                Primary Care Provider Office Phone # Fax #    Brian Perez [phone]       [facility]        [address] [facility] [address]        Thank you!     Thank you for choosing [facility]  for your care. Our goal is always to provide you with excellent care. Hearing back from our patients is one way we can continue to improve our services. Please take a few minutes to complete the written survey that you may receive in the mail after your visit with us. Thank you!             Your Updated Medication List - Protect others around you: Learn how to safely use, store and throw away your medicines at www.disposemymeds.org.          This list is accurate as of: 6/15/17  2:22 PM.  Always use your most recent med list.                   Brand Name Dispense Instructions for use    albuterol 108 (90 BASE) MCG/ACT Inhaler    albuterol    1 Inhaler    Inhale 2 puffs into the lungs every 4 hours as needed for shortness of breath / dyspnea Rinse mouthpiece weekly.       amLODIPine 5 MG tablet    NORVASC    90 tablet    Take 1 tablet (5 mg) by mouth daily       aspirin 325 MG EC tablet      Take by mouth daily       atorvastatin 20 MG tablet    LIPITOR    90 tablet    Take 1 tablet (20 mg) by mouth daily        B-12 1000 MCG Tbcr     100 tablet    Take 1,000 mcg by mouth daily       clopidogrel 75 MG tablet    PLAVIX    90 tablet    Take 1 tablet (75 mg) by mouth daily       doxycycline 100 MG capsule    VIBRAMYCIN    14 capsule    Take 1 capsule (100 mg) by mouth 2 times daily for 7 days       lisinopril 2.5 MG tablet    PRINIVIL/Zestril    90 tablet    Take 1 tablet (2.5 mg) by mouth daily       metoprolol 25 MG 24 hr tablet    TOPROL-XL    90 tablet    Take 1 tablet (25 mg) by mouth daily       nitroglycerin 0.4 MG sublingual tablet    NITROSTAT    25 tablet    Place 1 tablet (0.4 mg) under the tongue every 5 minutes as needed for chest pain for chest pain       ranitidine 150 MG tablet    ZANTAC    90 tablet    Take 1 tablet (150 mg) by mouth daily as needed for heartburn       tamsulosin 0.4 MG capsule    FLOMAX    30 capsule    Take 1 capsule (0.4 mg) by mouth daily

## 2017-06-15 NOTE — NURSING NOTE
"Chief Complaint   Patient presents with     Tick Bite     Health Maintenance     due fpr prevnar 13- will get at another time        Initial /67 (BP Location: Right arm, Patient Position: Chair, Cuff Size: Adult Regular)  Pulse 68  Temp 98.8  F (37.1  C) (Tympanic)  Ht 5' 3.5\" (1.613 m)  Wt 115 lb (52.2 kg)  SpO2 98%  BMI 20.05 kg/m2 Estimated body mass index is 20.05 kg/(m^2) as calculated from the following:    Height as of this encounter: 5' 3.5\" (1.613 m).    Weight as of this encounter: 115 lb (52.2 kg).  Medication Reconciliation: complete    "

## 2017-06-15 NOTE — PROGRESS NOTES
SUBJECTIVE:                                                    Evin Melgar is a 83 year old male who presents to clinic today for the following health issues:  Chief Complaint   Patient presents with     Tick Bite     Health Maintenance     due fpr prevnar 13- will get at another time          Concern - Tick Bite      Onset: 3 days ago     Description:   Patient found Tick attatched to his Upper Left Thigh 3 Days Ago.He is unsure what type of tick it was.     Intensity: mild    Progression of Symptoms:  same    Accompanying Signs & Symptoms:  Since being bit by tick patient has been more fatigued, no energy, napping more. Area where the tick was attatched is still red.        Previous history of similar problem:   None     Precipitating factors:   Worsened by: none     Alleviating factors:  Improved by: none        Therapies Tried and outcome: none   Verified above history with patient and spouse. She did not see the tick.  Patient states the tick looked bigger than a deer tick.  Patient denies pain or itching of the rash.      Problem list and histories reviewed & adjusted, as indicated.  Additional history: as documented    Patient Active Problem List   Diagnosis     Hypertension     Mild persistent asthma without complication     CAD (coronary artery disease)     Tubular adenoma     CKD (chronic kidney disease), stage III     GERD (gastroesophageal reflux disease)     Hyperlipidemia LDL goal <100     Advanced directives, counseling/discussion     OAB (overactive bladder)     Microscopic hematuria     Memory deficit     Health Care Home     COPD, mild (H)     Pain of left upper arm     Nocturia     Past Surgical History:   Procedure Laterality Date     ANGIOGRAM  10/07    coronary=mild, diffuse CAD; stent below patent     ANGIOPLASTY  9/07    stent to ostia of LAD     SURGICAL HISTORY OF -       Appendectomy       Social History   Substance Use Topics     Smoking status: Former Smoker     Quit date: 1/1/1949      Smokeless tobacco: Never Used     Alcohol use Yes      Comment: occasionally     Family History   Problem Relation Age of Onset     CEREBROVASCULAR DISEASE Mother      Lipids Mother      DIABETES Father      CEREBROVASCULAR DISEASE Father      DIABETES Brother      HEART DISEASE Brother          Current Outpatient Prescriptions   Medication Sig Dispense Refill     doxycycline (VIBRAMYCIN) 100 MG capsule Take 1 capsule (100 mg) by mouth 2 times daily for 7 days 14 capsule 0     tamsulosin (FLOMAX) 0.4 MG capsule Take 1 capsule (0.4 mg) by mouth daily 30 capsule 1     amLODIPine (NORVASC) 5 MG tablet Take 1 tablet (5 mg) by mouth daily 90 tablet 3     Cyanocobalamin (B-12) 1000 MCG TBCR Take 1,000 mcg by mouth daily 100 tablet 1     albuterol (ALBUTEROL) 108 (90 BASE) MCG/ACT Inhaler Inhale 2 puffs into the lungs every 4 hours as needed for shortness of breath / dyspnea Rinse mouthpiece weekly. 1 Inhaler 11     aspirin 325 MG EC tablet Take by mouth daily       ranitidine (ZANTAC) 150 MG tablet Take 1 tablet (150 mg) by mouth daily as needed for heartburn 90 tablet 2     lisinopril (PRINIVIL,ZESTRIL) 2.5 MG tablet Take 1 tablet (2.5 mg) by mouth daily 90 tablet 3     atorvastatin (LIPITOR) 20 MG tablet Take 1 tablet (20 mg) by mouth daily 90 tablet 3     metoprolol (TOPROL-XL) 25 MG 24 hr tablet Take 1 tablet (25 mg) by mouth daily 90 tablet 3     clopidogrel (PLAVIX) 75 MG tablet Take 1 tablet (75 mg) by mouth daily 90 tablet 3     nitroglycerin (NITROSTAT) 0.4 MG SL tablet Place 1 tablet (0.4 mg) under the tongue every 5 minutes as needed for chest pain for chest pain 25 tablet 3     No Known Allergies    Reviewed and updated as needed this visit by clinical staff  Tobacco  Allergies  Meds  Med Hx  Surg Hx  Fam Hx  Soc Hx      Reviewed and updated as needed this visit by Provider         ROS:  C: NEGATIVE for fever, chills, change in weight  INTEGUMENTARY/SKIN: see above  MUSCULOSKELETAL: see above  H:  "NEGATIVE for bleeding problems    OBJECTIVE:                                                    /67 (BP Location: Right arm, Patient Position: Chair, Cuff Size: Adult Regular)  Pulse 68  Temp 98.8  F (37.1  C) (Tympanic)  Ht 5' 3.5\" (1.613 m)  Wt 115 lb (52.2 kg)  SpO2 98%  BMI 20.05 kg/m2  Body mass index is 20.05 kg/(m^2).  GEN: alert, oriented x 3, NAD  SKIN: good turgor; on left medial thigh, 4.5 cm x 2.5 cm moderately and homogenously erythematous patch with central puncta with no attached tick, non-tender, not warm to touch  NECK: supple  EXT: no joint swelling x 4; no muscle tenderness all extremities  Diagnostic test results:  Diagnostic Test Results:  none      ASSESSMENT/PLAN:                                                        ICD-10-CM    1. Tick bite of thigh with local reaction, left, initial encounter S70.362A **Lyme Disease Cristine with reflex to WB Serum FUTURE 14d    W57.XXXA doxycycline (VIBRAMYCIN) 100 MG capsule     Timeline is not consistent with lyme disease, Discussed could be more a local reaction to the bite.  However, the more intense erythema and larger area of the rash is more than expected from a regular tick bite.  Discussed option to treat empirically vs testing first.  Patient and spouse preferred to test now but start abx as well.  If test negative, will stop abx after 1 week if apropriate.  Return precautions discussed and given to patient/spouse.    Follow up with Provider - depending on test result or as needed if rash worsens or with new symptoms.   Patient Instructions   After discussion today, you preferred to start antibiotics today, and test for lyme antibodies.  If negative test, antibiotics may be stopped after 1 week.  If positive test, will extend antibiotics to 2-3 week course.        Thank you for choosing [facility] (formerly [facility]3].  You may be receiving a survey in the mail from [employer] regarding your visit today.  Please take a few minutes to complete and " return the survey to let us know how we are doing.      If you have questions or concerns, please contact us via [employer] or you can contact your care team at [phone].    Our Clinic hours are:  Monday 6:40 am  to 7:00 pm  Tuesday -Friday 6:40 am to 5:00 pm    The [place] outpatient lab hours are:  Monday - Friday 6:10 am to 4:45 pm  Saturdays 7:00 am to 11:00 am  Appointments are required, call [phone]    If you have clinical questions after hours or would like to schedule an appointment,  call the clinic at [phone].    Tick Bite (Antibiotic Treatment)    Ticks are small arachnids that feed on the blood of rodents, rabbits, birds, deer, dogs and humans. The bite may cause a local reaction like that of a spider, with a small amount of local redness, itching and slight swelling. Sometimes there is no local reaction.  Most tick bites are harmless. But some ticks carry diseases, such as Lyme disease or [place]uriy Mountain spotted fever. These can be passed to people at the time of the bite. Lyme disease is of greatest concern. Right now you have no symptoms of Lyme disease or other serious reaction to the bite. It is important to watch for the warning signs, which could appear weeks to months after the tick bite.  Home care  The following guidelines can help you care for your bite at home:    If itching is a problem, avoid tight clothing and anything that heats up your skin. This includes hot showers or baths and direct sunlight. This often makes the itching worse.    An ice pack will reduce local areas of redness and itching. Make your own ice pack by putting ice cubes in a zip-top plastic bag and wrapping it in a thin towel. Over-the-counter creams containing benzocaine may help with itching.    You can use an antihistamine with diphenhydramine if your doctor did not give you another antihistamine. This medicine may be used to reduce itching if large areas of the skin are involved. It is available at  drugstores and grocery stores. If symptoms continue, talk with your doctor or pharmacist about other over-the counter medicines that may be helpful.    Your doctor may prescribe antibiotics to reduce your risk of getting Lyme disease. It is very important that you take them exactly as directed until they are completely finished.  Follow-up care  Follow up with your healthcare provider, or as advised.  Call 911  Call 911 if any of these occur:    Irregular or rapid heartbeat    Numbness, tingling, or weakness in the arms or legs  When to seek medical advice  Call your healthcare provider right away if any of these occur:  Signs of local infection. Watch for these during the next few days.    Increasing redness around the bite site    Increased pain or swelling    Fever over 100.4 F (38.0 C), or as directed by your healthcare provider    Fluid draining from the bite area  Signs of tick-related disease. Watch for these over the next few weeks to months.    Circular, red, ring-like rash appears at the bite area within 1 to 3 weeks    Tiredness, fever or chills, nausea or vomiting    Neck pain or stiffness, headache, or confusion    Muscle or bone aches    Joint pain or swelling, especially in the knee    Weakness on one side of the face  Date Last Reviewed: 10/1/2016    7720-5412 [employer]. [address] [place] [address], [place] [address]. All rights reserved. This information is not intended as a substitute for professional medical care. Always follow your healthcare professional's instructions.            Brian Perez MD  [facility]

## 2017-06-16 LAB — B BURGDOR IGG+IGM SER QL: 0.07 (ref 0–0.89)

## 2017-06-23 ENCOUNTER — CARE COORDINATION (OUTPATIENT)
Dept: CASE MANAGEMENT | Facility: CLINIC | Age: 82
End: 2017-06-23

## 2017-06-23 NOTE — PROGRESS NOTES
Clinic Care Coordination Contact  OUTREACH    Referral Information:  Referral Source: PCP  Reason for Contact: Follow-up on [facility] Conference: No     Universal Utilization:   ED Visits in last year: 0  Hospital visits in last year: 0  Last PCP appointment: 03/28/17  Missed Appointments: 13  Concerns: No concerns  Multiple Providers or Specialists: Yes    Clinical Concerns:  Current Medical Concerns:   Patient Active Problem List   Diagnosis     Hypertension     Mild persistent asthma without complication     CAD (coronary artery disease)     Tubular adenoma     CKD (chronic kidney disease), stage III     GERD (gastroesophageal reflux disease)     Hyperlipidemia LDL goal <100     Advanced directives, counseling/discussion     OAB (overactive bladder)     Microscopic hematuria     Memory deficit     Health Care Home     COPD, mild (H)     Pain of left upper arm     Nocturia        Education Provided to patient: [facility] [place] Transportation   Clinical Pathway Name: None  Clinical Pathway:   Clinic Care Coordinator - Depression Initial Assessment  **Reviewed patients discharge instructions/provider specific recommendations.    Pt identified current zone as: Yellow    Pt identifies current symptoms of: fatigue, lack of sleep, change in appetite, inactivity or withdrawal from typically pleasurable activities and feelings of hopelessness and helplessness    Most recent PHQ-9 score:   PHQ-9 SCORE 7/13/2016   Total Score 14     Pt is taking medications as prescribed:  Yes  Depression action plan/survival kit reviewed, revised as needed and sent to patient:Yes  Care plan developed/updated that outlines goals with help of patient and mailed to pt:   Yes  Clinic Care Coordinator - Depression Lifestyle  Patient identified their support system as: wife, family.      Medication Management:  Patient understands and is adherent, wife assists     Functional Status:  Mobility Status:  Independent  Equipment Currently Used at Home: none  Transportation: Pt's spouse provides     Psychosocial:  Current living arrangement:: I live in a private home with spouse  Financial/Insurance: [facility], No concerns     Resources and Interventions:  Current Resources:No formal supports  Advanced Care Plans/Directives on file:: No  Referrals Placed: Community Resources, Transportation, Mental Health     Goals:   Goal 1 Statement: I would like to identify an two community resources to utilize by summer.  Goal 1 Progression Percent: 10%  Goal 1 Progression Date: 06/23/17  Barriers: Memory deficits  Strengths: Good support through spouse    Patient/Caregiver understanding: Spoke with pt and pt's spouse about the resources. Pt's spouse reports that they haven't gotten involved with any of the activites yet, but [place]. So pt's mood has improved slightly with the activities and family involvement. KAYLEEN CC will plan to follow-up with patient and spouse in several weeks to check in.  Frequency of Care Coordination:3-4 weeks  Upcoming appointment: 09/28/17     Plan: KAYLEEN PROCTOR will check in in 3-4 weeks.     GUIDO Adams  [facility]   [phone]  [email or web address]

## 2017-06-23 NOTE — LETTER
[facility] - CARE MANAGEMENT DEPT  [address] [address]  Phone: [phone]      June 27, 2017      Ernie Melgar  [address]    [facility] [address]    Dear Ernie,  I am the Clinic Care Coordinator that works with your primary care provider's clinic. I wanted to thank you for spending the time to talk with me.  Included is a flyer with a description of what Clinic Care Coordination is and how I can further assist you.    Please feel free to keep this letter and contact information to contact me at [phone] with any further questions or concerns that may arise. I will plan to touch base with you two in a few weeks. [place]!!!      Sincerely,     Josefina Orona    Enclosed: I have enclosed helpful educational material including transportation options and a calender of the local [facility] actvities. Please review and call me with any questions.

## 2017-06-23 NOTE — LETTER
My Depression Action Plan  Name: Evin Melgar   Date of Birth 6/1/1934  Date: 6/23/2017    My doctor: Brian Perez   My clinic: [facility] - CARE [facility]NAGEMENT DEPT  [address] [place] [id number] [phone]          GREEN    ZONE   Good Control    What it looks like:     Things are going generally well. You have normal up s and down s. You may even feel depressed from time to time, but bad moods usually last less than a day.   What you need to do:  1. Continue to care for yourself (see self care plan)  2. Check your depression survival kit and update it as needed  3. Follow your physician s recommendations including any medication.  4. Do not stop taking medication unless you consult with your physician first.           YELLOW         ZONE Getting Worse    What it looks like:     Depression is starting to interfere with your life.     It may be hard to get out of bed; you may be starting to isolate yourself from others.    Symptoms of depression are starting to last most all day and this has happened for several days.     You may have suicidal thoughts but they are not constant.   What you need to do:     1. Call your care team, your response to treatment will improve if you keep your care team informed of your progress. Yellow periods are signs an adjustment may need to be made.     2. Continue your self-care, even if you have to fake it!    3. Talk to someone in your support network    4. Open up your depression survival kit           RED    ZONE Medical Alert - Get Help    What it looks like:     Depression is seriously interfering with your life.     You may experience these or other symptoms: You can t get out of bed most days, can t work or engage in other necessary activities, you have trouble taking care of basic hygiene, or basic responsibilities, thoughts of suicide or death that will not go away, self-injurious behavior.     What you need to  do:  1. Call your care team and request a same-day appointment. If they are not available (weekends or after hours) call your local crisis line, emergency room or 911.      Electronically signed by: Josefina Orona, June 23, 2017    Depression Self Care Plan / Survival Kit    Self-Care for Depression  Here s the deal. Your body and mind are really not as separate as most people think.  What you do and think affects how you feel and how you feel influences what you do and think. This means if you do things that people who feel good do, it will help you feel better.  Sometimes this is all it takes.  There is also a place for medication and therapy depending on how severe your depression is, so be sure to consult with your medical provider and/ or Behavioral Health Consultant if your symptoms are worsening or not improving.     In order to better manage my stress, I will:    Exercise  Get some form of exercise, every day. This will help reduce pain and release endorphins, the  feel good  chemicals in your brain. This is almost as good as taking antidepressants!  This is not the same as joining a gym and then never going! (they count on that by the way ) It can be as simple as just going for a walk or doing some gardening, anything that will get you moving.      Hygiene   Maintain good hygiene (Get out of bed in the morning, Make your bed, Brush your teeth, Take a shower, and Get dressed like you were going to work, even if you are unemployed).  If your clothes don't fit try to get ones that do.    Diet  I will strive to eat foods that are good for me, drink plenty of water, and avoid excessive sugar, caffeine, alcohol, and other mood-altering substances.  Some foods that are helpful in depression are: complex carbohydrates, B vitamins, flaxseed, fish or fish oil, fresh fruits and vegetables.    Psychotherapy  I agree to participate in Individual Therapy (if recommended).    Medication  If prescribed medications, I  agree to take them.  Missing doses can result in serious side effects.  I understand that drinking alcohol, or other illicit drug use, may cause potential side effects.  I will not stop my medication abruptly without first discussing it with my provider.    Staying Connected With Others  I will stay in touch with my friends, family members, and my primary care provider/team.    Use your imagination  Be creative.  We all have a creative side; it doesn t matter if it s oil painting, sand castles, or mud pies! This will also kick up the endorphins.    Witness Beauty  (AKA stop and smell the roses) Take a look outside, even in mid-winter. Notice colors, textures. Watch the squirrels and birds.     Service to others  Be of service to others.  There is always someone else in need.  By helping others we can  get out of ourselves  and remember the really important things.  This also provides opportunities for practicing all the other parts of the program.    Humor  Laugh and be silly!  Adjust your TV habits for less news and crime-drama and more comedy.    Control your stress  Try breathing deep, massage therapy, biofeedback, and meditation. Find time to relax each day.     My support system    Clinic Contact:  Phone number:    Contact 1:  Phone number:    Contact 2:  Phone number:    [employer]/:  Phone number:    Therapist:  Phone number:    Local crisis center:    Phone number:    Other community support:  Phone number:

## 2017-06-28 DIAGNOSIS — I10 ESSENTIAL HYPERTENSION WITH GOAL BLOOD PRESSURE LESS THAN 140/90: ICD-10-CM

## 2017-06-28 DIAGNOSIS — R55 SYNCOPE AND COLLAPSE: ICD-10-CM

## 2017-06-28 DIAGNOSIS — I25.10 CORONARY ARTERY DISEASE INVOLVING NATIVE CORONARY ARTERY, ANGINA PRESENCE UNSPECIFIED, UNSPECIFIED WHETHER NATIVE OR TRANSPLANTED HEART: ICD-10-CM

## 2017-06-28 DIAGNOSIS — E78.5 HYPERLIPIDEMIA LDL GOAL <100: ICD-10-CM

## 2017-06-28 RX ORDER — LISINOPRIL 2.5 MG/1
2.5 TABLET ORAL DAILY
Qty: 90 TABLET | Refills: 3 | Status: SHIPPED | OUTPATIENT
Start: 2017-06-28 | End: 2018-03-29

## 2017-06-28 RX ORDER — ATORVASTATIN CALCIUM 20 MG/1
20 TABLET, FILM COATED ORAL DAILY
Qty: 90 TABLET | Refills: 3 | Status: SHIPPED | OUTPATIENT
Start: 2017-06-28 | End: 2018-03-29

## 2017-06-28 RX ORDER — METOPROLOL SUCCINATE 25 MG/1
25 TABLET, EXTENDED RELEASE ORAL DAILY
Qty: 90 TABLET | Refills: 3 | Status: SHIPPED | OUTPATIENT
Start: 2017-06-28 | End: 2018-03-29

## 2017-06-28 RX ORDER — CLOPIDOGREL BISULFATE 75 MG/1
75 TABLET ORAL DAILY
Qty: 90 TABLET | Refills: 3 | Status: SHIPPED | OUTPATIENT
Start: 2017-06-28 | End: 2018-07-16

## 2017-07-19 DIAGNOSIS — R35.1 NOCTURIA: ICD-10-CM

## 2017-07-19 NOTE — TELEPHONE ENCOUNTER
Tamsulosin        Last Written Prescription Date: 06/08/17  Last Fill Quantity: 30, # refills: 1    Last Office Visit with [facility], [facility] or [facility] prescribing provider:  09/07/16   Future Office Visit:    Next 5 appointments (look out 90 days)     Jul 26, 2017 10:00 AM CDT   Return Visit with Say Fisher MD   [facility] ([facility])    [address] [facility] [address]   [phone]            Sep 28, 2017  8:00 AM CDT   [facility] Visit with Katherine Lopez MD   [facility]    [address] [address]   [phone]                  BP Readings from Last 3 Encounters:   06/15/17 120/67   06/08/17 135/77   04/20/17 133/69

## 2017-07-21 ENCOUNTER — HOSPITAL ENCOUNTER (OUTPATIENT)
Dept: CARDIOLOGY | Facility: CLINIC | Age: 82
Discharge: HOME | End: 2017-07-21
Attending: INTERNAL MEDICINE | Admitting: INTERNAL MEDICINE
Payer: COMMERCIAL

## 2017-07-21 DIAGNOSIS — I25.10 CORONARY ARTERY DISEASE INVOLVING NATIVE CORONARY ARTERY, ANGINA PRESENCE UNSPECIFIED, UNSPECIFIED WHETHER NATIVE OR TRANSPLANTED HEART: ICD-10-CM

## 2017-07-21 DIAGNOSIS — R55 SYNCOPE AND COLLAPSE: ICD-10-CM

## 2017-07-21 DIAGNOSIS — E78.5 HYPERLIPIDEMIA LDL GOAL <100: ICD-10-CM

## 2017-07-21 DIAGNOSIS — I10 ESSENTIAL HYPERTENSION WITH GOAL BLOOD PRESSURE LESS THAN 140/90: ICD-10-CM

## 2017-07-21 DIAGNOSIS — I25.10 CORONARY ARTERY DISEASE INVOLVING NATIVE CORONARY ARTERY OF NATIVE HEART WITHOUT ANGINA PECTORIS: ICD-10-CM

## 2017-07-21 LAB
ALT SERPL W P-5'-P-CCNC: 21 U/L (ref 0–70)
ANION GAP SERPL CALCULATED.3IONS-SCNC: 7 MMOL/L (ref 3–14)
BUN SERPL-MCNC: 23 MG/DL (ref 7–30)
CALCIUM SERPL-MCNC: 9.2 MG/DL (ref 8.5–10.1)
CHLORIDE SERPL-SCNC: 106 MMOL/L (ref 94–109)
CHOLEST SERPL-MCNC: 126 MG/DL
CO2 SERPL-SCNC: 25 MMOL/L (ref 20–32)
CREAT SERPL-MCNC: 1.18 MG/DL (ref 0.66–1.25)
GFR SERPL CREATININE-BSD FRML MDRD: 59 ML/MIN/1.7M2
GLUCOSE SERPL-MCNC: 98 MG/DL (ref 70–99)
HDLC SERPL-MCNC: 62 MG/DL
LDLC SERPL CALC-MCNC: 47 MG/DL
NONHDLC SERPL-MCNC: 64 MG/DL
POTASSIUM SERPL-SCNC: 4.4 MMOL/L (ref 3.4–5.3)
SODIUM SERPL-SCNC: 138 MMOL/L (ref 133–144)
TRIGL SERPL-MCNC: 85 MG/DL

## 2017-07-21 PROCEDURE — 84460 ALANINE AMINO (ALT) (SGPT): CPT | Performed by: INTERNAL MEDICINE

## 2017-07-21 PROCEDURE — 80048 BASIC METABOLIC PNL TOTAL CA: CPT | Performed by: INTERNAL MEDICINE

## 2017-07-21 PROCEDURE — 80061 LIPID PANEL: CPT | Performed by: INTERNAL MEDICINE

## 2017-07-21 PROCEDURE — 25500064 ZZH RX 255 OP 636: Performed by: INTERNAL MEDICINE

## 2017-07-21 PROCEDURE — 93306 TTE W/DOPPLER COMPLETE: CPT | Mod: 26 | Performed by: INTERNAL MEDICINE

## 2017-07-21 PROCEDURE — 36415 COLL VENOUS BLD VENIPUNCTURE: CPT | Performed by: INTERNAL MEDICINE

## 2017-07-21 PROCEDURE — 40000264 ECHO COMPLETE WITH OPTISON

## 2017-07-21 RX ADMIN — HUMAN ALBUMIN MICROSPHERES AND PERFLUTREN 2 ML: 10; .22 INJECTION, SOLUTION INTRAVENOUS at 08:20

## 2017-07-24 RX ORDER — TAMSULOSIN HYDROCHLORIDE 0.4 MG/1
0.4 CAPSULE ORAL DAILY
Qty: 30 CAPSULE | Refills: 1 | Status: SHIPPED | OUTPATIENT
Start: 2017-07-24 | End: 2017-09-28

## 2017-07-26 ENCOUNTER — OFFICE VISIT (OUTPATIENT)
Dept: CARDIOLOGY | Facility: CLINIC | Age: 82
End: 2017-07-26
Attending: INTERNAL MEDICINE
Payer: COMMERCIAL

## 2017-07-26 VITALS
SYSTOLIC BLOOD PRESSURE: 135 MMHG | BODY MASS INDEX: 19.53 KG/M2 | OXYGEN SATURATION: 97 % | DIASTOLIC BLOOD PRESSURE: 70 MMHG | HEART RATE: 69 BPM | WEIGHT: 112 LBS

## 2017-07-26 DIAGNOSIS — I25.10 CORONARY ARTERY DISEASE INVOLVING NATIVE CORONARY ARTERY, ANGINA PRESENCE UNSPECIFIED, UNSPECIFIED WHETHER NATIVE OR TRANSPLANTED HEART: ICD-10-CM

## 2017-07-26 DIAGNOSIS — E78.5 HYPERLIPIDEMIA LDL GOAL <100: ICD-10-CM

## 2017-07-26 DIAGNOSIS — I10 ESSENTIAL HYPERTENSION WITH GOAL BLOOD PRESSURE LESS THAN 140/90: ICD-10-CM

## 2017-07-26 DIAGNOSIS — R55 SYNCOPE AND COLLAPSE: ICD-10-CM

## 2017-07-26 DIAGNOSIS — I25.10 CORONARY ARTERY DISEASE INVOLVING NATIVE CORONARY ARTERY OF NATIVE HEART WITHOUT ANGINA PECTORIS: ICD-10-CM

## 2017-07-26 PROCEDURE — 99214 OFFICE O/P EST MOD 30 MIN: CPT | Performed by: INTERNAL MEDICINE

## 2017-07-26 NOTE — MR AVS SNAPSHOT
After Visit Summary   7/26/2017    Evin Melgar    MRN: [id number]           Patient Information     Date Of Birth          6/1/1934        Visit Information        Provider Department      7/26/2017 10:00 AM Say Fisher MD [facility] PHYSICIAN HEART AT [facility]        Today's Diagnoses     Coronary artery disease involving native coronary artery of native heart without angina pectoris        Essential hypertension with goal blood pressure less than 140/90        Hyperlipidemia LDL goal <100        Syncope and collapse        Coronary artery disease involving native coronary artery, angina presence unspecified, unspecified whether native or transplanted heart          Care Instructions    Thank you for your [facility] visit today. Your provider has recommended the following:  Medication Changes:  None today. Continue taking your medications as you have been.  Recommendations:  Fasting labs Lipids, ALT, BMP in 6 months.  Follow-up:  See Dr. Fisher for cardiology follow up in 6 months.  We kindly ask that you call cardiology scheduling at [phone] three months prior to requested revisit date to schedule future cardiology appointments.  Reminder:  1. Please bring in your current medication list or your medication, over the counter supplements and vitamin bottles as we will review these at each office visit.               [facility]~[address]~[place], [place]~[address]  Questions about your visit today?  Call your Cardiology Clinic RN's-Staci Cazares and/or Shilpi Acevedo at [phone].                Follow-ups after your visit        Additional Services     Follow-Up with Cardiologist                 Your next 10 appointments already scheduled     Sep 28, 2017  8:00 AM CDT   [facility] Visit with Katherine Lopez MD   [facility]    [address]  [id number]   [phone]              Future tests that were ordered for you today     Open Future Orders        Priority Expected Expires Ordered    Basic metabolic panel Routine 1/22/2018 7/26/2018 7/26/2017    Lipid Profile Routine 1/22/2018 7/26/2018 7/26/2017    ALT Routine 1/22/2018 7/26/2018 7/26/2017    Follow-Up with Cardiologist Routine 1/26/2018 7/26/2018 7/26/2017            Who to contact     If you have questions or need follow up information about today's clinic visit or your schedule please contact [facility] PHYSICIAN HEART AT [facility] directly at [phone].  Normal or non-critical lab and imaging results will be communicated to you by 2C2Phart, letter or phone within 4 business days after the clinic has received the results. If you do not hear from us within 7 days, please contact the clinic through Viewdlet or phone. If you have a critical or abnormal lab result, we will notify you by phone as soon as possible.  Submit refill requests through [employer]isMobile or call your pharmacy and they will forward the refill request to us. Please allow 3 business days for your refill to be completed.          Additional Information About Your Visit        [id number]! Information     [employer] gives you secure access to your electronic health record. If you see a primary care provider, you can also send messages to your care team and make appointments. If you have questions, please call your primary care clinic.  If you do not have a primary care provider, please call [phone] and they will assist you.        Care EveryWhere ID     This is your Care EveryWhere ID. This could be used by other organizations to access your [place] medical records  [id number]        Your Vitals Were     Pulse Pulse Oximetry BMI (Body Mass Index)             69 97% 19.53 kg/m2          Blood Pressure from Last 3 Encounters:   07/26/17 135/70   06/15/17 120/67   06/08/17 135/77    Weight from Last 3 Encounters:    07/26/17 50.8 kg (112 lb)   06/15/17 52.2 kg (115 lb)   06/08/17 52.3 kg (115 lb 6.4 oz)              We Performed the Following     Follow-Up with Cardiologist          Today's Medication Changes          These changes are accurate as of: 7/26/17 10:44 AM.  If you have any questions, ask your nurse or doctor.               Stop taking these medicines if you haven't already. Please contact your care team if you have questions.     fluticasone 50 MCG/ACT spray   Commonly known as:  FLONASE   Stopped by:  Say Fisher MD                    Primary Care Provider Office Phone # Fax #    Brian Mehdi Perez [phone]       [address] [address]        Equal Access to Services     LEANDER MENJIVAR : Laura fay Sosravani, waaxda luqadaha, qaybta kaalmada juan, westley ramey . So [facility] [phone].    ATENCIÓN: Si habla español, tiene a vargas disposición servicios gratuitos de asistencia lingüística. Angela al [phone].    We comply with applicable federal civil rights laws and [place] laws. We do not discriminate on the basis of race, color, national origin, age, disability sex, sexual orientation or gender identity.            Thank you!     Thank you for choosing [facility] PHYSICIAN HEART AT [facility]  for your care. Our goal is always to provide you with excellent care. Hearing back from our patients is one way we can continue to improve our services. Please take a few minutes to complete the written survey that you may receive in the mail after your visit with us. Thank you!             Your Updated Medication List - Protect others around you: Learn how to safely use, store and throw away your medicines at www.disposemymeds.org.          This list is accurate as of: 7/26/17 10:44 AM.  Always use your most recent med list.                   Brand Name Dispense  Instructions for use Diagnosis    albuterol 108 (90 BASE) MCG/ACT Inhaler    albuterol    1 Inhaler    Inhale 2 puffs into the lungs every 4 hours as needed for shortness of breath / dyspnea Rinse mouthpiece weekly.    Moderate persistent asthma without complication       amLODIPine 5 MG tablet    NORVASC    90 tablet    Take 1 tablet (5 mg) by mouth daily    Essential hypertension with goal blood pressure less than 140/90       aspirin 325 MG EC tablet      Take by mouth daily        atorvastatin 20 MG tablet    LIPITOR    90 tablet    Take 1 tablet (20 mg) by mouth daily    Hyperlipidemia LDL goal <100       B-12 1000 MCG Tbcr     100 tablet    Take 1,000 mcg by mouth daily    Late onset Alzheimer's disease without behavioral disturbance       clopidogrel 75 MG tablet    PLAVIX    90 tablet    Take 1 tablet (75 mg) by mouth daily    Coronary artery disease involving native coronary artery, angina presence unspecified, unspecified whether native or transplanted heart       lisinopril 2.5 MG tablet    PRINIVIL/Zestril    90 tablet    Take 1 tablet (2.5 mg) by mouth daily    Essential hypertension with goal blood pressure less than 140/90       metoprolol 25 MG 24 hr tablet    TOPROL-XL    90 tablet    Take 1 tablet (25 mg) by mouth daily    Syncope and collapse       nitroGLYcerin 0.4 MG sublingual tablet    NITROSTAT    25 tablet    Place 1 tablet (0.4 mg) under the tongue every 5 minutes as needed for chest pain for chest pain    CAD (coronary artery disease)       ranitidine 150 MG tablet    ZANTAC    90 tablet    Take 1 tablet (150 mg) by mouth daily as needed for heartburn    GERD (gastroesophageal reflux disease)       tamsulosin 0.4 MG capsule    FLOMAX    30 capsule    Take 1 capsule (0.4 mg) by mouth daily    Nocturia

## 2017-07-26 NOTE — PATIENT INSTRUCTIONS
Thank you for your [facility] visit today. Your provider has recommended the following:  Medication Changes:  None today. Continue taking your medications as you have been.  Recommendations:  Fasting labs Lipids, ALT, BMP in 6 months.  Follow-up:  See Dr. Fsiher for cardiology follow up in 6 months.  We kindly ask that you call cardiology scheduling at [phone] three months prior to requested revisit date to schedule future cardiology appointments.  Reminder:  1. Please bring in your current medication list or your medication, over the counter supplements and vitamin bottles as we will review these at each office visit.               [facility] HEART CARE  [facility]~[address]~[place], [place]~[address]  Questions about your visit today?  Call your Cardiology Clinic RN's-Staci Cazares and/or Shilpi Acevedo at [phone].

## 2017-07-26 NOTE — PROGRESS NOTES
Office Visit     7/26/2017  [facility] PHYSICIAN HEART AT [facility]    Say Fisher MD   Cardiology    Coronary artery disease involving native coronary artery of native heart without angina pectoris +4 more   Dx    RECHECK; Referred by Say Fisher MD   Reason for visit    Progress Notes      HISTORY OF PRESENT ILLNESS:  Evin Melgar is an 82-year-old slender white male with a history of coronary artery disease dating back to 2007.  He had a Cardiolite stress test at that time which was equivocal to ischemia infarct and in 04/2009 he had chest discomfort and was sent to the [facility] for myocardial infarction and cardiac catheterization.  It showed multivessel disease involving LAD without left main involvement, status post PTCA and drug-eluting stent which was a Cypher.  He had a drug-eluting stent placed in the proximal LAD as well as a PTCA of the obtuse marginal branch of the circumflex. He has a history of hypertension requiring multiple drugs.  He had stopped his Plavix prematurely in the past with recurrent chest discomfort and taken to the Cardiac Catheterization Laboratory with diffuse pain symptoms, but no evidence of thrombus or dissection.  He presents to Cardiology Clinic for followup of ischemic heart disease as well as his labile hypertension.  He has had no significant symptoms of chest discomfort, shortness of breath, palpitations, nausea, vomiting, diaphoresis or syncope.  He has occasional lightheadedness when he moves too quickly, but no significant dizziness.  He denies PND, orthopnea, fevers, chills or sweats.  He has had isolated episodes of gastroesophageal reflux disease, easy fatigability, general malaise and mild dyspnea on exertion.  He has had left arm pain evaluated by ultrasound after receiving flu injections.  He also had a cortisone injection with some improvement, although the pain has recurred.  Cardiolite  stress test in 2013 showed no evidence of ischemia or infarct.  Echocardiography earlier this year demonstrated a normal-sized left ventricle with intact systolic function with ejection fraction of 60-65% without significant valvular dysfunction.       MEDICATIONS:   1.  Ranitidine 150 mg a day.   2.  Amlodipine 5 mg a day.   3.  Cyanocobalamin 1000 mcg a day.   4.  Lisinopril 2.5 mg a day.   5.  Atorvastatin 20 mg a day.   6.  Metoprolol XL 25 mg a day.   7.  Clopidogrel 75 mg a day.   8.  Nitroglycerin 0.4 mg sublingual p.r.n.   9.  Albuterol inhaler as needed.   10.  Aspirin 81 mg a day.       LABORATORY DATA:  Demonstrated cholesterol 126, HDL 62, LDL 47, triglyceride 85, sodium 138, potassium 4.4, BUN 23, creatinine 1.18.  ALT 21.         The patient is able to participate in most activities without significant restriction although he has been limited by easy fatigability and general malaise.  He presents to Cardiology Clinic for followup of his ischemic heart disease.  He denies PND, orthopnea, fevers, chills or sweats.     The patient has done well since his loss clinic visit his appetite is mediocre and he does not have good sleep due to nocturia denies chest discomfort, shortness of breath, palpitations, dizziness.       PHYSICAL EXAMINATION:   VITAL SIGNS:  Blood pressure 135/70 with a heart rate of 69 and regular.  Weight was 112 pounds, which is stable.   NECK:  Without jugular venous distention, carotid bruit or palpable thyroid.   CHEST:  Essentially clear to percussion and auscultation with slight decreased breath sounds at the bases.   CARDIAC:  Regular rhythm, a soft S4 gallop, I/VI systolic murmur at the left sternal border with minimal radiation.  No diastolic murmur, rub or S3.   EXTREMITIES:  Without cyanosis or edema.       CLINICAL IMPRESSION:   1.  Stable cardiac condition.   2.  Ischemic heart disease, status post PTCA and stent of the left anterior descending as well as PTCA of the obtuse  marginal branch of the circumflex coronary artery in 2009.   3.  Hyperlipidemia, at goal.   4.  Asthma treated with inhaler.   5.  Mild anxiety, depression and decreased cognitive abilities over the past several months.   6.  Borderline systolic hypertension.   7.  Gastroesophageal reflux disease.   8.  Benign prostatic hypertrophy.       DISCUSSION:  The patient has done well from a cardiac viewpoint.  He has had significant lability of his blood pressure and has not adjusted much in the way of his medications.  He denies significant symptoms of angina pectoris or congestive heart failure.  His medications will be left unchanged at the present time.  Renal function appears fairly stable with stable creatinine.  He had an echocardiogram in 2017 that showed intact left ventricular function.  He will continue activity as tolerated at this time and aggressive GERD therapy.  Overall, he appears to be stable and participating in activities without significant restriction.  He needs to concentrate on his diet for better nutrition while continuing his activity and attempting to improve sleep.      RECOMMENDATIONS:   1.  Continue present medications.   2.  Close followup of serum lipids, basic metabolic panel and blood pressure.   3.  Diet and exercise program as tolerated.   4.  Aggressive GERD therapy.   5.  Urologic followup.   7.  Routine medical followup.   8.  Cardiology followup in 6 months.           CORWIN MARCOS MD, [facility]

## 2017-07-26 NOTE — LETTER
7/26/2017    Brian Perez MD  [facility]          [address] [place] [address]    RE: Evin Melgar       Dear Colleague,    I had the pleasure of seeing Evin Melgar in the [facility].    Office Visit     7/26/2017  [facility] PHYSICIAN HEART AT [facility]    Say Fisher MD   Cardiology    Coronary artery disease involving native coronary artery of native heart without angina pectoris +4 more   Dx    RECHECK; Referred by Say Fisher MD   Reason for visit    Progress Notes      HISTORY OF PRESENT ILLNESS:  Evin Melgar is an 82-year-old slender white male with a history of coronary artery disease dating back to 2007.  He had a Cardiolite stress test at that time which was equivocal to ischemia infarct and in 04/2009 he had chest discomfort and was sent to the [facility] for myocardial infarction and cardiac catheterization.  It showed multivessel disease involving LAD without left main involvement, status post PTCA and drug-eluting stent which was a Cypher.  He had a drug-eluting stent placed in the proximal LAD as well as a PTCA of the obtuse marginal branch of the circumflex. He has a history of hypertension requiring multiple drugs.  He had stopped his Plavix prematurely in the past with recurrent chest discomfort and taken to the Cardiac Catheterization Laboratory with diffuse pain symptoms, but no evidence of thrombus or dissection.  He presents to Cardiology Clinic for followup of ischemic heart disease as well as his labile hypertension.  He has had no significant symptoms of chest discomfort, shortness of breath, palpitations, nausea, vomiting, diaphoresis or syncope.  He has occasional lightheadedness when he moves too quickly, but no significant dizziness.  He denies PND, orthopnea, fevers, chills or sweats.  He has had isolated episodes of gastroesophageal reflux  disease, easy fatigability, general malaise and mild dyspnea on exertion.  He has had left arm pain evaluated by ultrasound after receiving flu injections.  He also had a cortisone injection with some improvement, although the pain has recurred.  Cardiolite stress test in 2013 showed no evidence of ischemia or infarct.  Echocardiography earlier this year demonstrated a normal-sized left ventricle with intact systolic function with ejection fraction of 60-65% without significant valvular dysfunction.       MEDICATIONS:   1.  Ranitidine 150 mg a day.   2.  Amlodipine 5 mg a day.   3.  Cyanocobalamin 1000 mcg a day.   4.  Lisinopril 2.5 mg a day.   5.  Atorvastatin 20 mg a day.   6.  Metoprolol XL 25 mg a day.   7.  Clopidogrel 75 mg a day.   8.  Nitroglycerin 0.4 mg sublingual p.r.n.   9.  Albuterol inhaler as needed.   10.  Aspirin 81 mg a day.       LABORATORY DATA:  Demonstrated cholesterol 126, HDL 62, LDL 47, triglyceride 85, sodium 138, potassium 4.4, BUN 23, creatinine 1.18.  ALT  21.         The patient is able to participate in most activities without significant restriction although he has been limited by easy fatigability and general malaise.  He presents to Cardiology Clinic for followup of his ischemic heart disease.  He denies PND, orthopnea, fevers, chills or sweats.     The patient has done well since his loss clinic visit his appetite is mediocre and he does not have good sleep due to nocturia denies chest discomfort, shortness of breath, palpitations, dizziness.       PHYSICAL EXAMINATION:   VITAL SIGNS:  Blood pressure 135/70 with a heart rate of 69 and regular.  Weight was 112 pounds, which is stable.   NECK:  Without jugular venous distention, carotid bruit or palpable thyroid.   CHEST:  Essentially clear to percussion and auscultation with slight decreased breath sounds at the bases.   CARDIAC:  Regular rhythm, a soft S4 gallop, I/VI systolic murmur at the left sternal border with minimal  radiation.  No diastolic murmur, rub or S3.   EXTREMITIES:  Without cyanosis or edema.       CLINICAL IMPRESSION:   1.  Stable cardiac condition.   2.  Ischemic heart disease, status post PTCA and stent of the left anterior descending as well as PTCA of the obtuse marginal branch of the circumflex coronary artery in 2009.   3.  Hyperlipidemia, at goal.   4.  Asthma treated with inhaler.   5.  Mild anxiety, depression and decreased cognitive abilities over the past several months.   6.  Borderline systolic hypertension.   7.  Gastroesophageal reflux disease.   8.  Benign prostatic hypertrophy.       DISCUSSION:  The patient has done well from a cardiac viewpoint.  He has had significant lability of his blood pressure and has not adjusted much in the way of his medications.  He denies significant symptoms of angina pectoris or congestive heart failure.  His medications will be left unchanged at the present time.  Renal function appears fairly stable with stable creatinine.  He  had an echocardiogram in 2017 that showed intact left ventricular function.  He will continue activity as tolerated at this time and aggressive GERD therapy.  Overall, he appears to be stable and participating in activities without significant restriction.  He needs to concentrate on his diet for better nutrition while continuing his activity and attempting to improve sleep.      RECOMMENDATIONS:   1.  Continue present medications.   2.  Close followup of serum lipids, basic metabolic panel and blood pressure.   3.  Diet and exercise program as tolerated.   4.  Aggressive GERD therapy.   5.  Urologic followup.   7.  Routine medical followup.   8.  Cardiology followup in 6 months.           CORWIN FISHER MD, [facility]          Thank you for allowing me to participate in the care of your patient.    Sincerely,     Corwin Fisher MD     [facility]

## 2017-08-16 ENCOUNTER — OFFICE VISIT (OUTPATIENT)
Dept: FAMILY MEDICINE | Facility: CLINIC | Age: 82
End: 2017-08-16
Payer: COMMERCIAL

## 2017-08-16 ENCOUNTER — RADIANT APPOINTMENT (OUTPATIENT)
Dept: GENERAL RADIOLOGY | Facility: CLINIC | Age: 82
End: 2017-08-16
Attending: FAMILY MEDICINE
Payer: COMMERCIAL

## 2017-08-16 VITALS
TEMPERATURE: 99.1 F | SYSTOLIC BLOOD PRESSURE: 103 MMHG | BODY MASS INDEX: 19.46 KG/M2 | DIASTOLIC BLOOD PRESSURE: 57 MMHG | HEIGHT: 64 IN | OXYGEN SATURATION: 98 % | HEART RATE: 69 BPM | WEIGHT: 114 LBS

## 2017-08-16 DIAGNOSIS — M17.12 PRIMARY OSTEOARTHRITIS OF LEFT KNEE: ICD-10-CM

## 2017-08-16 DIAGNOSIS — M25.562 ACUTE PAIN OF LEFT KNEE: Primary | ICD-10-CM

## 2017-08-16 PROCEDURE — 73560 X-RAY EXAM OF KNEE 1 OR 2: CPT | Mod: LT

## 2017-08-16 PROCEDURE — 99214 OFFICE O/P EST MOD 30 MIN: CPT | Performed by: FAMILY MEDICINE

## 2017-08-16 ASSESSMENT — ANXIETY QUESTIONNAIRES
5. BEING SO RESTLESS THAT IT IS HARD TO SIT STILL: NEARLY EVERY DAY
3. WORRYING TOO MUCH ABOUT DIFFERENT THINGS: NEARLY EVERY DAY
2. NOT BEING ABLE TO STOP OR CONTROL WORRYING: NEARLY EVERY DAY
6. BECOMING EASILY ANNOYED OR IRRITABLE: MORE THAN HALF THE DAYS
7. FEELING AFRAID AS IF SOMETHING AWFUL MIGHT HAPPEN: NOT AT ALL
GAD7 TOTAL SCORE: 17
1. FEELING NERVOUS, ANXIOUS, OR ON EDGE: NEARLY EVERY DAY

## 2017-08-16 ASSESSMENT — PATIENT HEALTH QUESTIONNAIRE - PHQ9
5. POOR APPETITE OR OVEREATING: NEARLY EVERY DAY
SUM OF ALL RESPONSES TO PHQ QUESTIONS 1-9: 23

## 2017-08-16 NOTE — PATIENT INSTRUCTIONS
You will be contacted in 1-2 business days to get a schedule for the orthopedic specialist  Schedule MRI of the left knee to look at the soft tissue structures.  Acetaminophen 325 mg orally 1-2 tabs every 4-6 hrs as needed for pain.    Avoid activities that increase pain.    Final result of xray today to be given to you once it is released by the radiologist within 24 hrs.      Thank you for choosing [facility].  You may be receiving a survey in the mail from [employer] regarding your visit today.  Please take a few minutes to complete and return the survey to let us know how we are doing.      If you have questions or concerns, please contact us via Scout or you can contact your care team at [phone].    Our Clinic hours are:  Monday 6:40 am  to 7:00 pm  Tuesday -Friday 6:40 am to 5:00 pm    The [place] outpatient lab hours are:  Monday - Friday 6:10 am to 4:45 pm  Saturdays 7:00 am to 11:00 am  Appointments are required, call [phone]    If you have clinical questions after hours or would like to schedule an appointment,  call the clinic at [phone].    Understanding Osteoarthritis of the Knee    A joint is a place where two bones meet. The knee is called a hinge joint. This joint is formed where the thighbone (femur) meets the shinbone (tibia). A healthy knee joint bends freely. Knee osteoarthritis is a condition where parts of the knee joint wear out. This can lead to pain, stiffness, and limited movement.   What is osteoarthritis?  Every joint contains a smooth tissue called cartilage. Cartilage cushions the ends of bones and helps bones in a joint glide smoothly against each other. Knee osteoarthritis occurs when cartilage in the knee joint begins to break down and wear away. Bones may become exposed and rub together. The cartilage may become irritated and rough. This prevents smooth movement of the joint and can lead to pain.  Causes of osteoarthritis of the knee  Causes can  include:    Wear and tear from normal use over time    Overuse of the knee during sports or work activities    Being overweight. This increases stress on the knee joint.    Misalignment of the knee joint    Injury to the knee  Symptoms of osteoarthritis of the knee  Common symptoms include:    Pain and swelling around the joint. The pain and swelling get worse with activity and better with rest.    Grinding sound when moving the knee    Reduced knee movement    Knee stiffness. This is often worse first thing in the morning.  Treating osteoarthritis of the knee  Osteoarthritis is a long-term condition. Treatment usually focuses on managing symptoms. Treatment may include:    Over-the-counter or prescription medicines taken by mouth to help relieve pain and swelling    Injections of medicine into the joint to help relieve symptoms for a time    A weight-loss plan for people who are overweight    A plan of physical therapy and exercises to improve the strength and flexibility of the muscles around the knee    Heat or cold therapy to help relieve pain and stiffness    Assistive devices that help with movement, such as a cane or a walker    Assistive devices that make activities of daily life easier, such as raised toilet seats or shower bars  If other treatments don t do enough to relieve symptoms, you may need surgery to replace the joint. During this surgery, the damaged joint is removed. An artificial knee joint is then put into place. This can help relieve pain and stiffness and restore movement of the knee.     When to call your healthcare provider  Call your healthcare provider right away if you have any of these:    Fever of 100.4 F (38 C) or higher, or as directed    Symptoms that don t get better with prescribed medicines or get worse    New symptoms   Date Last Reviewed: 3/10/2016    8227-9104 [employer]. [address] [place] [address], [place] [address]. All rights reserved. This information is not  intended as a substitute for professional medical care. Always follow your healthcare professional's instructions.

## 2017-08-16 NOTE — PROGRESS NOTES
SUBJECTIVE:                                                    Evin Melgar is a 83 year old male who presents to clinic today for the following health issues:      Joint Pain /Knee Pain    Onset: 3 weeks    Description: Pt states that he tripped over a kitchen chair and fell on his knee 3 weeks ago. His left knee is very painful with bruising. Pt has trouble when he gets up to walk.  Location: left knee - medial aspect   Character: Sharp and Dull ache    Intensity: moderate    Progression of Symptoms: same    Accompanying Signs & Symptoms:  Other symptoms: weakness of left knee and swelling  Denies thigh or calf pain/swelling/redness.    History:   Previous similar pain: YES- Pt has arthritis in the knee as well.      Precipitating factors:   Trauma or overuse: YES- Pt fell and landed on his left knee 3 weeks ago.    Alleviating factors:  Improved by: deep heating rub    Therapies Tried and outcome: Asprin helped for 1 day.    Verified above history with patient and wife.  Patient states when he fell 3 weeks ago, he did not have lingering pain.  Patient states pain started on left knee 1 week after the fall.    No previous knee imaging.      Problem list and histories reviewed & adjusted, as indicated.  Additional history: as documented    Patient Active Problem List   Diagnosis     Hypertension     Mild persistent asthma without complication     CAD (coronary artery disease)     Tubular adenoma     CKD (chronic kidney disease), stage III     GERD (gastroesophageal reflux disease)     Hyperlipidemia LDL goal <100     Advanced directives, counseling/discussion     OAB (overactive bladder)     Microscopic hematuria     Memory deficit     Health Care Home     COPD, mild (H)     Pain of left upper arm     Nocturia     Past Surgical History:   Procedure Laterality Date     ANGIOGRAM  10/07    coronary=mild, diffuse CAD; stent below patent     ANGIOPLASTY  9/07    stent to ostia of LAD     SURGICAL HISTORY OF -        Appendectomy       Social History   Substance Use Topics     Smoking status: Former Smoker     Quit date: 1/1/1949     Smokeless tobacco: Never Used     Alcohol use Yes      Comment: occasionally     Family History   Problem Relation Age of Onset     CEREBROVASCULAR DISEASE Mother      Lipids Mother      DIABETES Father      CEREBROVASCULAR DISEASE Father      DIABETES Brother      HEART DISEASE Brother          Current Outpatient Prescriptions   Medication Sig Dispense Refill     atorvastatin (LIPITOR) 20 MG tablet Take 1 tablet (20 mg) by mouth daily 90 tablet 3     clopidogrel (PLAVIX) 75 MG tablet Take 1 tablet (75 mg) by mouth daily 90 tablet 3     lisinopril (PRINIVIL/ZESTRIL) 2.5 MG tablet Take 1 tablet (2.5 mg) by mouth daily 90 tablet 3     metoprolol (TOPROL-XL) 25 MG 24 hr tablet Take 1 tablet (25 mg) by mouth daily 90 tablet 3     amLODIPine (NORVASC) 5 MG tablet Take 1 tablet (5 mg) by mouth daily 90 tablet 3     albuterol (ALBUTEROL) 108 (90 BASE) MCG/ACT Inhaler Inhale 2 puffs into the lungs every 4 hours as needed for shortness of breath / dyspnea Rinse mouthpiece weekly. 1 Inhaler 11     aspirin 325 MG EC tablet Take by mouth daily       ranitidine (ZANTAC) 150 MG tablet Take 1 tablet (150 mg) by mouth daily as needed for heartburn 90 tablet 2     nitroglycerin (NITROSTAT) 0.4 MG SL tablet Place 1 tablet (0.4 mg) under the tongue every 5 minutes as needed for chest pain for chest pain 25 tablet 3     tamsulosin (FLOMAX) 0.4 MG capsule Take 1 capsule (0.4 mg) by mouth daily 30 capsule 1     No Known Allergies      Reviewed and updated as needed this visit by clinical staff     Reviewed and updated as needed this visit by Provider         ROS:  C: NEGATIVE for fever, chills, change in weight  I: NEGATIVE for worrisome rashes, moles or lesions  MUSCULOSKELETAL:see above  N: NEGATIVE for weakness, dizziness or paresthesias  H: NEGATIVE for bleeding problems    OBJECTIVE:                                "                     /57 (BP Location: Right arm, Patient Position: Sitting, Cuff Size: Adult Small)  Pulse 69  Temp 99.1  F (37.3  C) (Tympanic)  Ht 5' 3.5\" (1.613 m)  Wt 114 lb (51.7 kg)  SpO2 98%  BMI 19.88 kg/m2  Body mass index is 19.88 kg/(m^2).  GENERAL:  alert and no distress  LEFT KNEE: visually, medial aspect appears larger than right; no erythema/ecchymosis; painful range of motion of all directions; moderate medial joint line TTP; no popliteal mass/tenderness  LEFT LOWER EXT; no calf tenderness/swelling  SKIN: no suspicious lesions, no rashes    Diagnostic test results:  Diagnostic Test Results:  No results found for this or any previous visit (from the past 24 hour(s)).       ASSESSMENT/PLAN:                                                      ICD-10-CM    1. Acute pain of left knee M25.562 XR Knee Standing AP Bilat & Lateral Left     MR Knee Left w/o Contrast   2. Primary osteoarthritis of left knee M17.12 MR Knee Left w/o Contrast     ORTHO  REFERRAL     Patient was advisd possible causes of pain in knee: DJD, strain, ligament/meniscus tears, other bony pathology  Patient concurred with xray today.  Dsicussed with him medial knee DJD and blood vessel calcifications - radiology report pending.  Acetaminophen 325 mg orally 1-2 tabs every 4-6 hrs as needed for pain  Advised MRI due to tenderness on exam to rule out soft tissue derangement. Patient concurred.  Consult ortho for options for further pain relief and treatment. Patient concurred.  Activity modificaitons.  Return precautions discussed and given to patient/spouse.        Follow up with Provider - at ortho consult   Patient Instructions     You will be contacted in 1-2 business days to get a schedule for the orthopedic specialist  Schedule MRI of the left knee to look at the soft tissue structures.  Acetaminophen 325 mg orally 1-2 tabs every 4-6 hrs as needed for pain.    Avoid activities that increase pain.    Final result " of xray today to be given to you once it is released by the radiologist within 24 hrs.      Thank you for choosing [facility].  You may be receiving a survey in the mail from [employer]aim[employer] regarding your visit today.  Please take a few minutes to complete and return the survey to let us know how we are doing.      If you have questions or concerns, please contact us via [employer] or you can contact your care team at [phone].    Our Clinic hours are:  Monday 6:40 am  to 7:00 pm  Tuesday -Friday 6:40 am to 5:00 pm    The [place] outpatient lab hours are:  Monday - Friday 6:10 am to 4:45 pm  Saturdays 7:00 am to 11:00 am  Appointments are required, call [phone]    If you have clinical questions after hours or would like to schedule an appointment,  call the clinic at [phone].    Understanding Osteoarthritis of the Knee    A joint is a place where two bones meet. The knee is called a hinge joint. This joint is formed where the thighbone (femur) meets the shinbone (tibia). A healthy knee joint bends freely. Knee osteoarthritis is a condition where parts of the knee joint wear out. This can lead to pain, stiffness, and limited movement.   What is osteoarthritis?  Every joint contains a smooth tissue called cartilage. Cartilage cushions the ends of bones and helps bones in a joint glide smoothly against each other. Knee osteoarthritis occurs when cartilage in the knee joint begins to break down and wear away. Bones may become exposed and rub together. The cartilage may become irritated and rough. This prevents smooth movement of the joint and can lead to pain.  Causes of osteoarthritis of the knee  Causes can include:    Wear and tear from normal use over time    Overuse of the knee during sports or work activities    Being overweight. This increases stress on the knee joint.    Misalignment of the knee joint    Injury to the knee  Symptoms of osteoarthritis of the knee  Common symptoms include:    Pain  and swelling around the joint. The pain and swelling get worse with activity and better with rest.    Grinding sound when moving the knee    Reduced knee movement    Knee stiffness. This is often worse first thing in the morning.  Treating osteoarthritis of the knee  Osteoarthritis is a long-term condition. Treatment usually focuses on managing symptoms. Treatment may include:    Over-the-counter or prescription medicines taken by mouth to help relieve pain and swelling    Injections of medicine into the joint to help relieve symptoms for a time    A weight-loss plan for people who are overweight    A plan of physical therapy and exercises to improve the strength and flexibility of the muscles around the knee    Heat or cold therapy to help relieve pain and stiffness    Assistive devices that help with movement, such as a cane or a walker    Assistive devices that make activities of daily life easier, such as raised toilet seats or shower bars  If other treatments don t do enough to relieve symptoms, you may need surgery to replace the joint. During this surgery, the damaged joint is removed. An artificial knee joint is then put into place. This can help relieve pain and stiffness and restore movement of the knee.     When to call your healthcare provider  Call your healthcare provider right away if you have any of these:    Fever of 100.4 F (38 C) or higher, or as directed    Symptoms that don t get better with prescribed medicines or get worse    New symptoms   Date Last Reviewed: 3/10/2016    0526-5776 [employer]. [address] [place] [address], [place] [address]. All rights reserved. This information is not intended as a substitute for professional medical care. Always follow your healthcare professional's instructions.            Brian Perez MD  [facility]

## 2017-08-16 NOTE — MR AVS SNAPSHOT
After Visit Summary   8/16/2017    Evin Melgar    MRN: [id number]           Patient Information     Date Of Birth          6/1/1934        Visit Information        Provider Department      8/16/2017 10:00 AM Brian Perez MD [facility]        Today's Diagnoses     Acute pain of left knee    -  1    Primary osteoarthritis of left knee          Care Instructions    You will be contacted in 1-2 business days to get a schedule for the orthopedic specialist  Schedule MRI of the left knee to look at the soft tissue structures.  Acetaminophen 325 mg orally 1-2 tabs every 4-6 hrs as needed for pain.    Avoid activities that increase pain.    Final result of xray today to be given to you once it is released by the radiologist within 24 hrs.      Thank you for choosing [facility].  You may be receiving a survey in the mail from [employer] regarding your visit today.  Please take a few minutes to complete and return the survey to let us know how we are doing.      If you have questions or concerns, please contact us via [employer] or you can contact your care team at [phone].    Our Clinic hours are:  Monday 6:40 am  to 7:00 pm  Tuesday -Friday 6:40 am to 5:00 pm    The [place] outpatient lab hours are:  Monday - Friday 6:10 am to 4:45 pm  Saturdays 7:00 am to 11:00 am  Appointments are required, call [phone]    If you have clinical questions after hours or would like to schedule an appointment,  call the clinic at [phone].    Understanding Osteoarthritis of the Knee    A joint is a place where two bones meet. The knee is called a hinge joint. This joint is formed where the thighbone (femur) meets the shinbone (tibia). A healthy knee joint bends freely. Knee osteoarthritis is a condition where parts of the knee joint wear out. This can lead to pain, stiffness, and limited movement.   What is osteoarthritis?  Every joint contains a smooth tissue called cartilage.  Cartilage cushions the ends of bones and helps bones in a joint glide smoothly against each other. Knee osteoarthritis occurs when cartilage in the knee joint begins to break down and wear away. Bones may become exposed and rub together. The cartilage may become irritated and rough. This prevents smooth movement of the joint and can lead to pain.  Causes of osteoarthritis of the knee  Causes can include:    Wear and tear from normal use over time    Overuse of the knee during sports or work activities    Being overweight. This increases stress on the knee joint.    Misalignment of the knee joint    Injury to the knee  Symptoms of osteoarthritis of the knee  Common symptoms include:    Pain and swelling around the joint. The pain and swelling get worse with activity and better with rest.    Grinding sound when moving the knee    Reduced knee movement    Knee stiffness. This is often worse first thing in the morning.  Treating osteoarthritis of the knee  Osteoarthritis is a long-term condition. Treatment usually focuses on managing symptoms. Treatment may include:    Over-the-counter or prescription medicines taken by mouth to help relieve pain and swelling    Injections of medicine into the joint to help relieve symptoms for a time    A weight-loss plan for people who are overweight    A plan of physical therapy and exercises to improve the strength and flexibility of the muscles around the knee    Heat or cold therapy to help relieve pain and stiffness    Assistive devices that help with movement, such as a cane or a walker    Assistive devices that make activities of daily life easier, such as raised toilet seats or shower bars  If other treatments don t do enough to relieve symptoms, you may need surgery to replace the joint. During this surgery, the damaged joint is removed. An artificial knee joint is then put into place. This can help relieve pain and stiffness and restore movement of the knee.     When to call  your healthcare provider  Call your healthcare provider right away if you have any of these:    Fever of 100.4 F (38 C) or higher, or as directed    Symptoms that don t get better with prescribed medicines or get worse    New symptoms   Date Last Reviewed: 3/10/2016    2591-4365 [employer]. [address] [place] [address]. All rights reserved. This information is not intended as a substitute for professional medical care. Always follow your healthcare professional's instructions.                Follow-ups after your visit        Additional Services     ORTHO  REFERRAL       [facility] is referring you to the Orthopedic  Services at [facility].       The  Representative will assist you in the coordination of your Orthopedic and Musculoskeletal Care as prescribed by your physician.    The  Representative will call you within 1 business day to help schedule your appointment, or you may contact the  Representative at:    All areas ~ [phone]     Type of Referral : Non Surgical       Timeframe requested: 3 - 5 days    Coverage of these services is subject to the terms and limitations of your health insurance plan.  Please call member services at your health plan with any benefit or coverage questions.      If X-rays, CT or MRI's have been performed, please contact the facility where they were done to arrange for , prior to your scheduled appointment.  Please bring this referral request to your appointment and present it to your specialist.                  Your next 10 appointments already scheduled     Sep 28, 2017  8:00 AM CDT   Return Visit with Katherine Lopez MD   [facility] ([facility]    [address] [address]   [phone]              Future tests that were ordered for you today     Open Future Orders        Priority Expected Expires  "Ordered    MR Knee Left w/o Contrast Routine  8/16/2018 8/16/2017            Who to contact     If you have questions or need follow up information about today's clinic visit or your schedule please contact [facility] directly at [phone].  Normal or non-critical lab and imaging results will be communicated to you by MyChart, letter or phone within 4 business days after the clinic has received the results. If you do not hear from us within 7 days, please contact the clinic through [employer] or phone. If you have a critical or abnormal lab result, we will notify you by phone as soon as possible.  Submit refill requests through [employer] or call your pharmacy and they will forward the refill request to us. Please allow 3 business days for your refill to be completed.          Additional Information About Your Visit        [employer] Information     [employer] gives you secure access to your electronic health record. If you see a primary care provider, you can also send messages to your care team and make appointments. If you have questions, please call your primary care clinic.  If you do not have a primary care provider, please call [phone] and they will assist you.        Care EveryWhere ID     This is your Care EveryWhere ID. This could be used by other organizations to access your [facility] medical records  [id number]        Your Vitals Were     Pulse Temperature Height Pulse Oximetry BMI (Body Mass Index)       69 99.1  F (37.3  C) (Tympanic) 5' 3.5\" (1.613 m) 98% 19.88 kg/m2        Blood Pressure from Last 3 Encounters:   08/16/17 103/57   07/26/17 135/70   06/15/17 120/67    Weight from Last 3 Encounters:   08/16/17 114 lb (51.7 kg)   07/26/17 112 lb (50.8 kg)   06/15/17 115 lb (52.2 kg)              We Performed the Following     ORTHO  REFERRAL     XR Knee Standing AP Bilat & Lateral Left        Primary Care Provider Office Phone # Fax #    Brian Perez [phone] " [phone]       [address] [facility] [address]        Equal Access to Services     LEANDER MENJIVAR : Hadii aad ku haddyanjulee Somelviali, waaxda luqadaha, qaybta kaalmada mohankori, waxjuan elkin rosalienick prestonrohan hampton karoline lowery. So [facility] [phone].    ATENCIÓN: Si habla español, tiene a vargas disposición servicios gratuitos de asistencia lingüística. Llame al [phone].    We comply with applicable federal civil rights laws and [place] laws. We do not discriminate on the basis of race, color, national origin, age, disability sex, sexual orientation or gender identity.            Thank you!     Thank you for choosing [facility]  for your care. Our goal is always to provide you with excellent care. Hearing back from our patients is one way we can continue to improve our services. Please take a few minutes to complete the written survey that you may receive in the mail after your visit with us. Thank you!             Your Updated Medication List - Protect others around you: Learn how to safely use, store and throw away your medicines at www.disposemymeds.org.          This list is accurate as of: 8/16/17 11:25 AM.  Always use your most recent med list.                   Brand Name Dispense Instructions for use Diagnosis    albuterol 108 (90 BASE) MCG/ACT Inhaler    albuterol    1 Inhaler    Inhale 2 puffs into the lungs every 4 hours as needed for shortness of breath / dyspnea Rinse mouthpiece weekly.    Moderate persistent asthma without complication       amLODIPine 5 MG tablet    NORVASC    90 tablet    Take 1 tablet (5 mg) by mouth daily    Essential hypertension with goal blood pressure less than 140/90       aspirin 325 MG EC tablet      Take by mouth daily        atorvastatin 20 MG tablet    LIPITOR    90 tablet    Take 1 tablet (20 mg) by mouth daily    Hyperlipidemia LDL goal <100       clopidogrel 75 MG tablet    PLAVIX    90 tablet    Take 1 tablet (75 mg) by mouth daily     Coronary artery disease involving native coronary artery, angina presence unspecified, unspecified whether native or transplanted heart       lisinopril 2.5 MG tablet    PRINIVIL/Zestril    90 tablet    Take 1 tablet (2.5 mg) by mouth daily    Essential hypertension with goal blood pressure less than 140/90       metoprolol 25 MG 24 hr tablet    TOPROL-XL    90 tablet    Take 1 tablet (25 mg) by mouth daily    Syncope and collapse       nitroGLYcerin 0.4 MG sublingual tablet    NITROSTAT    25 tablet    Place 1 tablet (0.4 mg) under the tongue every 5 minutes as needed for chest pain for chest pain    CAD (coronary artery disease)       ranitidine 150 MG tablet    ZANTAC    90 tablet    Take 1 tablet (150 mg) by mouth daily as needed for heartburn    GERD (gastroesophageal reflux disease)       tamsulosin 0.4 MG capsule    FLOMAX    30 capsule    Take 1 capsule (0.4 mg) by mouth daily    Nocturia

## 2017-08-17 ENCOUNTER — CARE COORDINATION (OUTPATIENT)
Dept: CARE COORDINATION | Facility: CLINIC | Age: 82
End: 2017-08-17

## 2017-08-17 ENCOUNTER — TELEPHONE (OUTPATIENT)
Dept: FAMILY MEDICINE | Facility: CLINIC | Age: 82
End: 2017-08-17

## 2017-08-17 ASSESSMENT — ANXIETY QUESTIONNAIRES: GAD7 TOTAL SCORE: 17

## 2017-08-17 NOTE — TELEPHONE ENCOUNTER
Reason for Call:  Other call back    Detailed comments: Imaging here at [facility] is calling stating she was calling to help this patient schedule the MRI ordered by . Meche was very concerned that neither  or wife, could understand why he was having this MRI. He was seen by Dr. Ordoñez yesterday and had an xray. Meche was hoping someone form Dr. Montoya care team could call and talk with this patient, to resolve this. No appt was made for the MRI.    Phone Number Patient can be reached at: Home number on file [phone] (home)    Best Time: any    Can we leave a detailed message on this number? YES   Lillie Watts  [facility]      Call taken on 8/17/2017 at 2:32 PM by Lillie Watts

## 2017-08-17 NOTE — PROGRESS NOTES
Clinic Care Coordination Contact  OUTREACH    Referral Information:  Referral Source: PCP  Reason for Contact: Follow-up on resources  Care Conference: No     Universal Utilization:   ED Visits in last year: 0  Hospital visits in last year: 0  Last PCP appointment: 03/28/17  Missed Appointments: 13  Concerns: No concerns  Multiple Providers or Specialists: Yes    Clinical Concerns:  Current Medical Concerns:   Patient Active Problem List   Diagnosis     Hypertension     Mild persistent asthma without complication     CAD (coronary artery disease)     Tubular adenoma     CKD (chronic kidney disease), stage III     GERD (gastroesophageal reflux disease)     Hyperlipidemia LDL goal <100     Advanced directives, counseling/discussion     OAB (overactive bladder)     Microscopic hematuria     Memory deficit     Health Care Home     COPD, mild (H)     Pain of left upper arm     Nocturia       Current Behavioral Concerns: Self-Isolation, Inactivity  Education Provided to patient: [facility], Senior Linkage Line, [facility], Alzheimer's Association,    Clinical Pathway:   Clinic Care Coordinator - Depression Initial Assessment  **Reviewed patients discharge instructions/provider specific recommendations.    Pt identified current zone as: Yellow    Pt identifies current symptoms of: fatigue, lack of sleep, feelings of worthlessness, inability to concentrate, agitation, inactivity or withdrawal from typically pleasurable activities and feelings of hopelessness and helplessness    Most recent PHQ-9 score:   PHQ-9 SCORE 8/16/2017   Total Score 23     Pt is taking medications as prescribed:  No: Pt's spouse is going to start setting them up  Pt has the following behavioral providers/supports involved:  No supports  Depression action plan/survival kit reviewed, revised as needed and sent to patient:Yes  Care plan developed/updated that outlines goals with help of patient and mailed  to pt:   Yes   Clinic Care Coordinator - Depression Lifestyle  Patient identified their support system as: wife.    Importance of identifying support system and reaching out to supports when needed was discussed.     Medication Management:  Pt may not be adherent, Pt's spouse has agreed to set-up medications moving forward, Completed medication reconciliation     Functional Status:  Mobility Status: Independent, No reports of difficulty  Equipment Currently Used at Home: none  Transportation: Pt's spouse provides     Psychosocial:  Current living arrangement:: I live in a private home with spouse  Financial/Insurance: [facility], No concerns with finances     Resources and Interventions:  Current Resources: No formal supports  Advanced Care Plans/Directives on file:: No  Referrals Placed: Community Resources, Transportation, Mental Health     Goals:   Goal 1 Statement: I would like to identify an two community resources to utilize by fall.  Goal 1 Progression Percent: 30%  Barriers: depression, mood fluctuation related to alzheimer's  Strengths: supportive wife, transportation    Patient/Caregiver understanding: Spoke with pt's spouse who reports that patient has not followed through on any of the activities that were suggested. Pt's spouse reports that pt does not like to do anything. At times pt's mood will change and he will go to the store but this is rare. Upon review of PHQ-9 pt's score is rather high and pt is not taking any mood medications. Will discuss with PCP and Neurology to see if they have any suggestions. Pt's spouse reports pt has fairly poor recall, but is still physically independent with ADL's but often needs cueing. Pt's spouse reports she is not needing any additional in home supports at this time discussed homemaking, home health aide, and meal delivery services. Pt is doing okay at this time.     Frequency of Care Coordination: 3-4 weeks  Upcoming appointment: 09/28/17     Plan: KAYLEEN PROCTOR  to update PCP and Neurologist about mood and follow-up with pt's spouse after Neurology appointment.     GUIDO Adams  [facility]   [phone]  [email or web address]

## 2017-08-17 NOTE — TELEPHONE ENCOUNTER
If this is a concern they have, they can make a clinic appointment so this can be addressed in more detail.

## 2017-08-17 NOTE — PROGRESS NOTES
Clinic Care Coordination - Follow-up (Resources)            Brian Perez MD   [facility] Care Team 2 minutes ago (3:08 PM)               If this is a concern they have, they can make a clinic appointment so this can be addressed in more detail.        Care coord/ SW, will you be advising them to come in to see primary and / or mental health?   Latasha Gonzales RNC

## 2017-08-17 NOTE — PROGRESS NOTES
I recommend mental health consultation for these situations. Can refer if the patient is willing.    CY

## 2017-08-17 NOTE — PROGRESS NOTES
Yes, I will inform them.    GUIDO Adams  [facility]   [phone]  [email or web address]

## 2017-09-28 ENCOUNTER — OFFICE VISIT (OUTPATIENT)
Dept: NEUROLOGY | Facility: CLINIC | Age: 82
End: 2017-09-28
Payer: COMMERCIAL

## 2017-09-28 VITALS
WEIGHT: 111 LBS | HEART RATE: 70 BPM | DIASTOLIC BLOOD PRESSURE: 65 MMHG | BODY MASS INDEX: 19.35 KG/M2 | OXYGEN SATURATION: 97 % | SYSTOLIC BLOOD PRESSURE: 138 MMHG

## 2017-09-28 DIAGNOSIS — F02.80 LATE ONSET ALZHEIMER'S DISEASE WITHOUT BEHAVIORAL DISTURBANCE (H): Primary | ICD-10-CM

## 2017-09-28 DIAGNOSIS — G30.1 LATE ONSET ALZHEIMER'S DISEASE WITHOUT BEHAVIORAL DISTURBANCE (H): Primary | ICD-10-CM

## 2017-09-28 PROCEDURE — 99215 OFFICE O/P EST HI 40 MIN: CPT | Performed by: PSYCHIATRY & NEUROLOGY

## 2017-09-28 ASSESSMENT — MONTREAL COGNITIVE ASSESSMENT (MOCA)
7. [VIGILENCE] TAP WHEN HEARING DESIGNATED LETTER: 0
4. NAME EACH OF THE THREE ANIMALS SHOWN: 2
6. READ LIST OF DIGITS [FORWARD/BACKWARD]: 0
WHAT IS THE TOTAL SCORE (OUT OF 30): 11
10. [FLUENCY] NAME WORDS STARTING WITH DESIGNATED LETTER: 0
12. MEMORY INDEX SCORE: 0
VISUOSPATIAL/EXECUTIVE SUBSCORE: 1
9. REPEAT EACH SENTENCE: 0
8. SERIAL SUBTRACTION OF 7S: 1
13. ORIENTATION SUBSCORE: 6
11. FOR EACH PAIR OF WORDS, WHAT CATEGORY DO THEY BELONG TO (OUT OF 2): 0
WHAT LEVEL OF EDUCATION WAS ATTAINED: 1

## 2017-09-28 NOTE — MR AVS SNAPSHOT
After Visit Summary   9/28/2017    Evin Melgar    MRN: [id number]           Patient Information     Date Of Birth          6/1/1934        Visit Information        Provider Department      9/28/2017 8:00 AM Katherine Lopez MD [facility]        Care Instructions    Plan:    Talk to your assigned  with regards to in-home services, day programs, etc:  GUIDO Adams  [facility]   [phone]  [email or web address]    Follow-up with prior referral to mental health for the anxiety.   Make an appointment with Dr. Perez to discuss the POLST.   Let me know if you change your mind about some physical therapy for balance.     Return to neurology in 6 months, or sooner if new concerns arise.          Follow-ups after your visit        Who to contact     If you have questions or need follow up information about today's clinic visit or your schedule please contact [facility] directly at [phone].  Normal or non-critical lab and imaging results will be communicated to you by [email or web address], letter or phone within 4 business days after the clinic has received the results. If you do not hear from us within 7 days, please contact the clinic through InnoPadt or phone. If you have a critical or abnormal lab result, we will notify you by phone as soon as possible.  Submit refill requests through Sonda41 or call your pharmacy and they will forward the refill request to us. Please allow 3 business days for your refill to be completed.          Additional Information About Your Visit        [email or web address] Information     [email or web address] gives you secure access to your electronic health record. If you see a primary care provider, you can also send messages to your care team and make appointments. If you have questions, please call your primary care clinic.  If you do not have a primary care provider, please call [phone] and they will assist you.        Care EveryWhere ID      This is your Care EveryWhere ID. This could be used by other organizations to access your [facility] medical records  [id number]        Your Vitals Were     Pulse Pulse Oximetry BMI (Body Mass Index)             70 97% 19.35 kg/m2          Blood Pressure from Last 3 Encounters:   09/28/17 138/65   08/16/17 103/57   07/26/17 135/70    Weight from Last 3 Encounters:   09/28/17 111 lb (50.3 kg)   08/16/17 114 lb (51.7 kg)   07/26/17 112 lb (50.8 kg)              Today, you had the following     No orders found for display       Primary Care Provider Office Phone # Fax #    Brian Mehdi Perez [phone] [address] [facility] [address]        Equal Access to Services     LEANDER MENJIVAR : Hadii aby fay Sosravani, waaxda luqadaha, qaybta kaalmada aderohanyarob, westley ramey . So [facility] [phone].    ATENCIÓN: Si habla español, tiene a vargas disposición servicios gratuitos de asistencia lingüística. Angela al [phone].    We comply with applicable federal civil rights laws and [place] laws. We do not discriminate on the basis of race, color, national origin, age, disability sex, sexual orientation or gender identity.            Thank you!     Thank you for choosing [facility]  for your care. Our goal is always to provide you with excellent care. Hearing back from our patients is one way we can continue to improve our services. Please take a few minutes to complete the written survey that you may receive in the mail after your visit with us. Thank you!             Your Updated Medication List - Protect others around you: Learn how to safely use, store and throw away your medicines at www.disposemymeds.org.          This list is accurate as of: 9/28/17  8:47 AM.  Always use your most recent med list.                   Brand Name Dispense Instructions for use Diagnosis    albuterol 108 (90 BASE) MCG/ACT Inhaler    PROAIR HFA    1  Inhaler    Inhale 2 puffs into the lungs every 4 hours as needed for shortness of breath / dyspnea Rinse mouthpiece weekly.    Moderate persistent asthma without complication       amLODIPine 5 MG tablet    NORVASC    90 tablet    Take 1 tablet (5 mg) by mouth daily    Essential hypertension with goal blood pressure less than 140/90       atorvastatin 20 MG tablet    LIPITOR    90 tablet    Take 1 tablet (20 mg) by mouth daily    Hyperlipidemia LDL goal <100       clopidogrel 75 MG tablet    PLAVIX    90 tablet    Take 1 tablet (75 mg) by mouth daily    Coronary artery disease involving native coronary artery, angina presence unspecified, unspecified whether native or transplanted heart       lisinopril 2.5 MG tablet    PRINIVIL/Zestril    90 tablet    Take 1 tablet (2.5 mg) by mouth daily    Essential hypertension with goal blood pressure less than 140/90       metoprolol 25 MG 24 hr tablet    TOPROL-XL    90 tablet    Take 1 tablet (25 mg) by mouth daily    Syncope and collapse       nitroGLYcerin 0.4 MG sublingual tablet    NITROSTAT    25 tablet    Place 1 tablet (0.4 mg) under the tongue every 5 minutes as needed for chest pain for chest pain    CAD (coronary artery disease)       ranitidine 150 MG tablet    ZANTAC    90 tablet    Take 1 tablet (150 mg) by mouth daily as needed for heartburn    GERD (gastroesophageal reflux disease)

## 2017-09-28 NOTE — NURSING NOTE
"Chief Complaint   Patient presents with     RECHECK     Alzheimer's.         Initial /65 (BP Location: Right arm, Patient Position: Chair, Cuff Size: Adult Regular)  Pulse 70  Wt 111 lb (50.3 kg)  SpO2 97%  BMI 19.35 kg/m2 Estimated body mass index is 19.35 kg/(m^2) as calculated from the following:    Height as of 8/16/17: 5' 3.5\" (1.613 m).    Weight as of this encounter: 111 lb (50.3 kg).  Medication Reconciliation: complete    Patient prefers to be contacted: [phone]  Okay to leave detailed message on voicemail: eva Prater NR-CMA    "

## 2017-09-28 NOTE — PATIENT INSTRUCTIONS
Plan:    Talk to your assigned  with regards to in-home services, day programs, etc:  GUIDO Adams  [facility]   [phone]  [email or web address]    Follow-up with prior referral to mental health for the anxiety.   Make an appointment with Dr. Perez to discuss the POLST.   Let me know if you change your mind about some physical therapy for balance.     Return to neurology in 6 months, or sooner if new concerns arise.

## 2017-09-28 NOTE — PROGRESS NOTES
ESTABLISHED PATIENT NEUROLOGY NOTE    DATE OF VISIT: 9/28/2017  MRN: [id number]  PATIENT NAME: Evin Melgar  YOB: 1934    Chief Complaint   Patient presents with     RECHECK     Alzheimer's.       SUBJECTIVE:                                                      HISTORY OF PRESENT ILLNESS:  Evin is here for follow up regarding dementia. Likely Alzheimer's type. The patient has a several-year history of memory difficulties. Neuropsych testing was completed in May of 2016. Family has been caring for the patient at home, and when we met 6 months ago, they expressed interest in talking to our care coordinators and have been corresponding with them. One issue that comes up is the patient's desire to drive though he and family have been advised against it. He did ask for transportation information from our care coordinator last spring per their note. He also has a history of Left cerebellar strokes, SVID and cerebral atrophy.      A few days ago, the patient's daughter, Sruthi, sent me the following update/concerns:    I just wanted to update you on my Dad's condition before our visit this week.  His Alzheimer's is of course progressing.  His short term memory is fading.  He often does not know what he came into a room for when he is doing a task.  He asks when the lawn will be mowed when is was done the day before.  He has trouble with words.  He asked for the shampoo to put on but he meant his deodorant.  If you give him a task he often forgets what it was.  My mom has said that she told him to take a shower and she never heard the water run.  I told her that she needs to assist him with this as I am sure he just looked at the shower and did not know what to do.  We assume he is loosing weight as he does not eat much.  Every day he wakes up and wants to go see the doctor because he thinks he is having a heart attack.  He is no longer comfortable [place] hospital [place] away.  There are  3 meds that he is refusing to take.  Of course the vitamin B12, the aspirin and the one Dr. Perez prescribed to keep him from having to use the bathroom so often at night.  The driving issue has resurfaced.  There are days that his speech is pretty slurred and he does often have saliva coming our of his mouth.     Sorry I got so winded.  I just wanted to update you and let you know what is going on.  Also I think it would be a good time to possibly get a POLST for him?  I went through a health care directive with him when this first came into play but it was overwhelming for him.  I know a POLST is just 2 pages.     Thank you for your time and I will see you Thursday.     The patient's family also expressed interest in mental health referral for the patient's anxiety and frustration. I do not see any notes from that department.     Today the patient is accompanied by his wife. The patient tells me that he is doing fine. Though he admits he has good days and bad days. He admits that he will walk into a room and not remember why he is in there. He is not driving. He perseverates on the inconvenience this causes his family.     The patient's wife says that she is having trouble taking care at home. He is not taking the B12, the aspirin or Flomax. For some reason he refuses these. He does take his other medications though. Anxiety is still an issue from her perspective. She is not sure why they have not followed up with mental health regarding this.    The patient's daughter joins us late in the visit. She reiterates the above concerns. The family also tells me that his balance seems to be worse and he has had a few falls. No head injuries as far as they know.     CURRENT MEDICATIONS:     Current Outpatient Prescriptions on File Prior to Visit:  atorvastatin (LIPITOR) 20 MG tablet Take 1 tablet (20 mg) by mouth daily   clopidogrel (PLAVIX) 75 MG tablet Take 1 tablet (75 mg) by mouth daily   lisinopril  (PRINIVIL/ZESTRIL) 2.5 MG tablet Take 1 tablet (2.5 mg) by mouth daily   metoprolol (TOPROL-XL) 25 MG 24 hr tablet Take 1 tablet (25 mg) by mouth daily   amLODIPine (NORVASC) 5 MG tablet Take 1 tablet (5 mg) by mouth daily   albuterol (ALBUTEROL) 108 (90 BASE) MCG/ACT Inhaler Inhale 2 puffs into the lungs every 4 hours as needed for shortness of breath / dyspnea Rinse mouthpiece weekly.   ranitidine (ZANTAC) 150 MG tablet Take 1 tablet (150 mg) by mouth daily as needed for heartburn   nitroglycerin (NITROSTAT) 0.4 MG SL tablet Place 1 tablet (0.4 mg) under the tongue every 5 minutes as needed for chest pain for chest pain     No current facility-administered medications on file prior to visit.     RECENT DIAGNOSTIC STUDIES:   Results for orders placed or performed in visit on 08/16/17   XR Knee Standing AP Bilat & Lateral Left    Narrative    XR KNEE STANDING AP BILAT AND LATERAL LEFT 8/16/2017 11:06 AM    COMPARISON: None.    HISTORY: Left knee pain.      Impression    IMPRESSION: Tricompartmental left knee osteoarthritis with near  complete loss of joint space in the medial compartment and a small  knee effusion. No fractures are seen. Mild medial compartmental joint  space loss is also demonstrated on the right.    MARLENI JI MD         REVIEW OF SYSTEMS:                                                      Right shin pain. Otherwise 10-point review of systems is negative except as mentioned above in HPI.     EXAM:                                                      Physical Exam:   Vitals: /65 (BP Location: Right arm, Patient Position: Chair, Cuff Size: Adult Regular)  Pulse 70  Wt 111 lb (50.3 kg)  SpO2 97%  BMI 19.35 kg/m2  BMI= Body mass index is 19.35 kg/(m^2).  GENERAL: NAD. Thin.  Neurologic:  MENTAL STATUS: Alert, attentive though easily distracted, perseverative. Speech is fluent. Fair comprehension with cues. MoCA: 11/30 (normal is 26 and above).  CRANIAL NERVES: Visual fields intact to  confrontation. Pupils equally, round and reactive to light. Facial sensation and movement normal. EOM full. Hearing intact to conversation. Trapezius strength intact. Palate moves symmetrically. Tongue midline.  MOTOR: 4+/5 in proximal and distal muscle groups of lower extremities. Tone and bulk normal.    DTRs: Brisk throughout. Ankle jerks present. Babinski down-going.    SENSATION: Normal light touch and pinprick for age.  COORDINATION: Slow, but normal finger nose finger. Hand opening/closing appears normal.  STATION AND GAIT: Sway with Romberg. Gait is cautious.  CV: RRR. S1, S2.    NECK: No bruits.    ASSESSMENT and PLAN:                                                      Assessment and Plan:    ICD-10-CM    1. Late onset Alzheimer's disease without behavioral disturbance G30.1     F02.80        Mr. Melgar is a pleasant 82 yo man with likely Alzheimer's disease, now moderate-severe based on MoCA. The patient's family has some concerns about caring for him at home at this point and they agree that help is needed. We discussed available options in brief, but I will defer further discussion regarding resources to his . I believe that they can better explain and facilitate this than we can here in clinic. I am happy to place any orders that are needed from their standpoint. The family does have a care coordinator/SW (Josefina Orona), so the contact information was again provided. I will also reach out to Josefina.     I do think that mental health consultation would be helpful and it is not clear to me why this has not been pursued, but family is open to this. They also are open to talking to the patient's primary care provider with regards to the POLST. We generally do not do these in our clinic. We did discuss repeat head imaging and medications for memory. Daughter feels that the patient would refuse both. I also recommended some physical therapy, perhaps even in home, for the balance issues, but the  patient is not interested at this time. We talked about the natural progression of Alzheimer's. The patient's family understands that the current clinical picture remains consistent with this progression. We will do all that we can to help with Evin's quality of life.     Patient Instructions:  Talk to your assigned  with regards to in-home services, day programs, etc:  GUIDO Adams  [facility]   [phone]  [email or web address]    Follow-up with prior referral to mental health for the anxiety.   Make an appointment with Dr. Perez to discuss the POLST.   Let me know if you change your mind about some physical therapy for balance.     Return to neurology in 6 months, or sooner if new concerns arise.      Total Time: 40 minutes were spent with the patient and family. More than 50% of the time spent on counseling (as described above in Assessment and Plan) /coordinating the care.    Katherine Lopez MD  Neurology    CC: Brian Perez MD

## 2017-10-06 ENCOUNTER — CARE COORDINATION (OUTPATIENT)
Dept: CARE COORDINATION | Facility: CLINIC | Age: 82
End: 2017-10-06

## 2017-10-06 NOTE — PROGRESS NOTES
"Clinic Care Coordination Contact  OUTREACH    Referral Information:  Referral Source: PCP  Reason for Contact: Follow-up/Progression of Alzheimer's Disease  Care Conference: No     Universal Utilization:   ED Visits in last year: 0  Hospital visits in last year: 0  Last PCP appointment: 03/28/17  Missed Appointments: 13  Concerns: No concerns  Multiple Providers or Specialists: Yes    Clinical Concerns:  Current Medical Concerns:   Patient Active Problem List   Diagnosis     Hypertension     Mild persistent asthma without complication     CAD (coronary artery disease)     Tubular adenoma     CKD (chronic kidney disease), stage III     GERD (gastroesophageal reflux disease)     Hyperlipidemia LDL goal <100     Advanced directives, counseling/discussion     OAB (overactive bladder)     Microscopic hematuria     Memory deficit     Health Care Home     COPD, mild (H)     Pain of left upper arm     Nocturia       Current Behavioral Concerns: Increase in confusion and short term memory loss, Pt refusing to have any additional assistance brought in the home, \"bull headed at times\"     Education Provided to patient: Senior Linkage Line, Elder Law 's, LTC assessment process through the [facility], educated on MA and EW, educated on memory care units of [facility] and LTC placement- how they differ, Alzheimer's Association, support groups, community involvement; Jewish, recreation, private duty services to assist at home,Care Coordination role and contact information- discussed with wife and daughter. E-mail and mail sent to daughter with literature. Provided contact info for KAYLEEN PROCTOR if she has any additional questions  Clinical Pathway: None    Medication Management:  Pt is refusing 3 medications, Pt's wife has been encouraged to set-up and monitor medications, Reviewed 9/28    Functional Status:  Mobility Status: Independent-some decline in balance and gait  Equipment Currently Used at Home: none  Transportation: Pt's " "spouse/daughter provide, Pt continues to want to drive-is not actively driving     Psychosocial:  Current living arrangement:: I live in a private home with spouse  Financial/Insurance: [employer], Concerns regarding living in a memory care and costs involved, concerns about pt's spouse being able to pay for home and living expenses without spouse's income- referred to elder law  and LTCC     Resources and Interventions:  Current Resources: No formal supports  Advanced Care Plans/Directives on file:: No- would like to complete a POLST at next visit  Referrals Placed: Community Resources, Transportation, Mental Health, Alzheimer's Association, [facility]     Goals:   Goal 1 Statement: I (pt's daughter and wife) would like to learn about financial options in paying for memory or LTC.  Goal 1 Progression Percent: 10%  Goal 1 Progression Date: 10/06/17  Barriers: pt refusing assistance at times, wife easily frustrated  Strengths: both pt's spouse and daughter have experience with Alz. 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 Pt's spouse reports that she understands that it will only get worse as this is a degenerative disease. Inquired about future planning and if there are any plans in place when pt becomes difficult to manage at home. Pt's spouse requested that I speak with her daughter. Call to daughter Sruthi who reports that pt really does okay at home. However, pt's spouse sometimes forgets that pt needs more assistance ex: showering-needs cueing and help, medications need to be set-up and administered, meals need to be made, etc. Pt's daughter stated that pt's spouse can get easily frustrated and at times pt's daughter will take pt to her house for several hours to give spouse some respite. Discussed " "available community supports and future planning. Pt's daughter is concerned about finances and payment for LTC when it is needed. Discussed available options including meeting with an Elder Law  or having a LTCC completed by [facility] to discuss available options and payment for services. Sent e-mail with information. Also, discussed the Alzheimer's Association. Pt has not had any involvement with them yet. Will mail a pt \"memory kit\" to the daughter to review and discuss with pt's spouse. Also, included the web-site link and phone number in the e-mail.     Frequency of Care Coordination: 3-4 weeks    Upcoming appointment: None     Plan: Pt's daughter to review resources and set-up a LTCC. KAYLEEN CC to mail resources and follow-up in 3-4 weeks.     GUIDO Adams  [facility]   [phone]  [email or web address]        "

## 2017-11-01 ENCOUNTER — MYC MEDICAL ADVICE (OUTPATIENT)
Dept: NEUROLOGY | Facility: CLINIC | Age: 82
End: 2017-11-01

## 2017-11-08 ENCOUNTER — CARE COORDINATION (OUTPATIENT)
Dept: CARE COORDINATION | Facility: CLINIC | Age: 82
End: 2017-11-08

## 2017-11-08 NOTE — PROGRESS NOTES
Clinic Care Coordination Contact  OUTREACH    Referral Information:  Referral Source: PCP  Reason for Contact: Follow-up on future planning preparation, due to cognitive impairments  Care Conference: No     Universal Utilization:   ED Visits in last year: 0  Hospital visits in last year: 0  Last PCP appointment: 06/15/17  Missed Appointments: 0  Concerns: No concerns  Multiple Providers or Specialists: Yes    Clinical Concerns:  Current Medical Concerns: Late Onset Alzheimer's disease w/out behavioral disturbance- working on future planning and family education to prepare caregivers should cognitive decline progress- Actively working with neurology specialist to monitor progression   Current Behavioral Concerns: Some lack of insight into deficits, Can become easily frustrated/agitated/anxious     Education Provided to patient: Working with pt's daughter as she seems to be most familiar with Alsheimer's diagnosis and is very involved in his care. KAYLEEN PROCTOR reviewed Senior Linkage Line, Elder Law 's, LTC assessment process through the [facility], educated on MA and EW, educated on memory care units of [facility] and LTC placement- how they differ, Alzheimer's Association, support groups, community involvement; Rastafarian, recreation, private duty services to assist at home, transport resources again. Currently working on legal documentation prior to any further advancement into deficits.  Clinical Pathway: None    Medication Management:  Pt's spouse continues to monitor, Encouraged pt's daughter to bring medications to upcoming appointment to review if there are any questions, KAYLEEN PROCTOR had reviewed these with pt's wife last month and no reported changes since     Functional Status:  Mobility Status: Independent- no changes reported  Equipment Currently Used at Home: none  Transportation: Pt's family provides (pt still upset at times that he cannot drive anymore)      Psychosocial:  Current living arrangement:: I live in a private  home with spouse  Financial/Insurance: [employer]- working on POA paperwork so that pt's daughter can assist with finances the question of capacity will be discussed at upcoming appointment on 11/13 with Dr. Perez, Financial resources were discussed previously with pt's daughter and plan to revisit following the POA paperwork and will being completed     Resources and Interventions:  Current Resources: No formal supports  Advanced Care Plans/Directives on file:: No  Referrals Placed: Community Resources, Transportation, Mental Health, Alzheimer's Association, [facility]     Goals:   Goal 1 Statement: I (pt's daughter and wife) would like to learn about financial options in paying for memory or LTc.  Goal 1 Progression Percent: 60%  Goal 1 Progression Date: 11/08/17     Patient/Caregiver understanding: Pt's daughter denied any immediate questions or concerns and would like to touch base in a few weeks following PCP apt and apt with the  to discuss next steps.  Frequency of Care Coordination: 3-4 weeks  Upcoming appointment: 11/13/17     Plan: Pt and Pt's daughter to complete appointments. KAYLEEN PROCTOR will follow-up in several weeks.    GUIDO Adams  [facility]   [phone]  [email or web address]

## 2017-11-13 ENCOUNTER — OFFICE VISIT (OUTPATIENT)
Dept: FAMILY MEDICINE | Facility: CLINIC | Age: 82
End: 2017-11-13
Payer: COMMERCIAL

## 2017-11-13 VITALS
OXYGEN SATURATION: 97 % | SYSTOLIC BLOOD PRESSURE: 135 MMHG | HEIGHT: 64 IN | HEART RATE: 63 BPM | TEMPERATURE: 98.3 F | WEIGHT: 111.4 LBS | DIASTOLIC BLOOD PRESSURE: 79 MMHG | BODY MASS INDEX: 19.02 KG/M2

## 2017-11-13 DIAGNOSIS — Z23 NEED FOR PROPHYLACTIC VACCINATION AND INOCULATION AGAINST INFLUENZA: ICD-10-CM

## 2017-11-13 DIAGNOSIS — F06.4 ANXIETY DISORDER DUE TO MEDICAL CONDITION: Primary | ICD-10-CM

## 2017-11-13 DIAGNOSIS — Z78.9 POLST (PHYSICIAN ORDERS FOR LIFE-SUSTAINING TREATMENT): ICD-10-CM

## 2017-11-13 PROCEDURE — 90662 IIV NO PRSV INCREASED AG IM: CPT | Performed by: FAMILY MEDICINE

## 2017-11-13 PROCEDURE — 99214 OFFICE O/P EST MOD 30 MIN: CPT | Mod: 25 | Performed by: FAMILY MEDICINE

## 2017-11-13 PROCEDURE — G0008 ADMIN INFLUENZA VIRUS VAC: HCPCS | Performed by: FAMILY MEDICINE

## 2017-11-13 NOTE — PATIENT INSTRUCTIONS
You declined medication for anxiety for now.  It will be helpful to keep yourself busy with leisure activities, exercise as you tolerate and activities to help your mental capacity.    If still struggling with frequent anxiety or worry about your symptoms, see provider again in next 1-2 months.    POLST form has been signed today per your wishes.

## 2017-11-13 NOTE — NURSING NOTE
"Chief Complaint   Patient presents with     Anxiety     Pt here for anxiety issues.     Forms     would also like to do polst form       Initial /79  Pulse 63  Temp 98.3  F (36.8  C) (Tympanic)  Ht 5' 3.5\" (1.613 m)  Wt 111 lb 6.4 oz (50.5 kg)  BMI 19.42 kg/m2 Estimated body mass index is 19.42 kg/(m^2) as calculated from the following:    Height as of this encounter: 5' 3.5\" (1.613 m).    Weight as of this encounter: 111 lb 6.4 oz (50.5 kg).  Medication Reconciliation: complete  Laquita Umanzor CMA    "

## 2017-11-13 NOTE — MR AVS SNAPSHOT
After Visit Summary   11/13/2017    Evin Melagr    MRN: [id number]           Patient Information     Date Of Birth          6/1/1934        Visit Information        Provider Department      11/13/2017 1:40 PM Brian Perez MD [facility]        Today's Diagnoses     Anxiety disorder due to medical condition    -  1    POLST (Physician Orders for Life-Sustaining Treatment)        Need for prophylactic vaccination and inoculation against influenza          Care Instructions    You declined medication for anxiety for now.  It will be helpful to keep yourself busy with leisure activities, exercise as you tolerate and activities to help your mental capacity.    If still struggling with frequent anxiety or worry about your symptoms, see provider again in next 1-2 months.    POLST form has been signed today per your wishes.          Follow-ups after your visit        Who to contact     If you have questions or need follow up information about today's clinic visit or your schedule please contact [facility] directly at [phone].  Normal or non-critical lab and imaging results will be communicated to you by [employer], letter or phone within 4 business days after the clinic has received the results. If you do not hear from us within 7 days, please contact the clinic through [employer] or phone. If you have a critical or abnormal lab result, we will notify you by phone as soon as possible.  Submit refill requests through [employer] Receive or call your pharmacy and they will forward the refill request to us. Please allow 3 business days for your refill to be completed.          Additional Information About Your Visit        Community [employer] Information     [employer] Receive gives you secure access to your electronic health record. If you see a primary care provider, you can also send messages to your care team and make appointments. If you have questions, please call your primary care clinic.  If you  "do not have a primary care provider, please call [phone] and they will assist you.        Care EveryWhere ID     This is your Care EveryWhere ID. This could be used by other organizations to access your [facility] medical records  [id number]        Your Vitals Were     Pulse Temperature Height Pulse Oximetry BMI (Body Mass Index)       63 98.3  F (36.8  C) (Tympanic) 5' 3.5\" (1.613 m) 97% 19.42 kg/m2        Blood Pressure from Last 3 Encounters:   11/13/17 135/79   09/28/17 138/65   08/16/17 103/57    Weight from Last 3 Encounters:   11/13/17 111 lb 6.4 oz (50.5 kg)   09/28/17 111 lb (50.3 kg)   08/16/17 114 lb (51.7 kg)              We Performed the Following     ADMIN INFLUENZA (For MEDICARE Patients ONLY) []     FLU VACCINE, INCREASED ANTIGEN, PRESV FREE, AGE 65+ [55040]        Primary Care Provider Office Phone # Fax #    Brian Perez [phone] [address] [address]        Equal Access to Services     LEANDER MENJIVAR : Hadii aby prathero Sosravani, waaxda luqadaha, qaybta kaalmada juan, westley lowery. So [facility] [phone].    ATENCIÓN: Si habla español, tiene a vargas disposición servicios gratuitos de asistencia lingüística. Angela al [phone].    We comply with applicable federal civil rights laws and [place] laws. We do not discriminate on the basis of race, color, national origin, age, disability, sex, sexual orientation, or gender identity.            Thank you!     Thank you for choosing [facility]  for your care. Our goal is always to provide you with excellent care. Hearing back from our patients is one way we can continue to improve our services. Please take a few minutes to complete the written survey that you may receive in the mail after your visit with us. Thank you!             Your Updated Medication List - Protect others around you: Learn how to safely use, store and throw " away your medicines at www.disposemymeds.org.          This list is accurate as of: 11/13/17  2:52 PM.  Always use your most recent med list.                   Brand Name Dispense Instructions for use Diagnosis    albuterol 108 (90 BASE) MCG/ACT Inhaler    PROAIR HFA    1 Inhaler    Inhale 2 puffs into the lungs every 4 hours as needed for shortness of breath / dyspnea Rinse mouthpiece weekly.    Moderate persistent asthma without complication       amLODIPine 5 MG tablet    NORVASC    90 tablet    Take 1 tablet (5 mg) by mouth daily    Essential hypertension with goal blood pressure less than 140/90       atorvastatin 20 MG tablet    LIPITOR    90 tablet    Take 1 tablet (20 mg) by mouth daily    Hyperlipidemia LDL goal <100       clopidogrel 75 MG tablet    PLAVIX    90 tablet    Take 1 tablet (75 mg) by mouth daily    Coronary artery disease involving native coronary artery, angina presence unspecified, unspecified whether native or transplanted heart       lisinopril 2.5 MG tablet    PRINIVIL/Zestril    90 tablet    Take 1 tablet (2.5 mg) by mouth daily    Essential hypertension with goal blood pressure less than 140/90       metoprolol 25 MG 24 hr tablet    TOPROL-XL    90 tablet    Take 1 tablet (25 mg) by mouth daily    Syncope and collapse       nitroGLYcerin 0.4 MG sublingual tablet    NITROSTAT    25 tablet    Place 1 tablet (0.4 mg) under the tongue every 5 minutes as needed for chest pain for chest pain    CAD (coronary artery disease)       ranitidine 150 MG tablet    ZANTAC    90 tablet    Take 1 tablet (150 mg) by mouth daily as needed for heartburn    GERD (gastroesophageal reflux disease)

## 2017-11-13 NOTE — LETTER
[facility]  [address] [address]  [phone]          November 13, 2017    RE:  Evin Melgar                                                                                                                                                       [address]    [place] [facility] [address]            To whom it may concern:    Evin Melgar has been seen in clinic today. Based on today's evaluation, he shows baseline understanding of his health, and  his health care wishes.      Sincerely,        Brian Perez MD

## 2017-11-13 NOTE — PROGRESS NOTES
SUBJECTIVE:   Evin Melgar is a 83 year old male who presents to clinic today for the following health issues:  Chief Complaint   Patient presents with     Anxiety     Pt here for anxiety issues.     Forms     would also like to do polst form     Flu Shot       Abnormal Mood Symptoms  Onset: 6-8 months    Description:   Depression: YES- little  Anxiety: YES    Accompanying Signs & Symptoms:  Still participating in activities that you used to enjoy: no  Fatigue: YES- sleeps alot  Irritability: no  Difficulty concentrating: YES- could be due to alzheimer';s  Changes in appetite: YES- does not eat alot  Problems with sleep: YES- wakes up shortly after going to bed, has trouble falling back to sleep due to worrying about things  Heart racing/beating fast : YES  Thoughts of hurting yourself or others: none    History:   Recent stress: YES- due dx of alzheimers  Prior depression hospitalization: None  Family history of depression: No  Family history of anxiety: YES- mother    Precipitating factors:   Alcohol/drug use: no    Alleviating factors:  none    Therapies Tried and outcome: None      Spouse reports: cyclical complaints of right chest pain even after being evaluated by cardcioloty; patient would c/o of it daily like he has never been tested or evaluated; patient reports he fears he has heart disease, but cannot explain why; patient tangentially refers to different aspects of his condition in the past - him not able to drive anymore particularly.    Problem list and histories reviewed & adjusted, as indicated.  Additional history: as documented    Patient Active Problem List   Diagnosis     Hypertension     Mild persistent asthma without complication     CAD (coronary artery disease)     Tubular adenoma     CKD (chronic kidney disease), stage III     GERD (gastroesophageal reflux disease)     Hyperlipidemia LDL goal <100     Advanced directives, counseling/discussion     OAB (overactive bladder)     Microscopic  hematuria     Memory deficit     Health Care Home     COPD, mild (H)     Pain of left upper arm     Nocturia     POLST (Physician Orders for Life-Sustaining Treatment)     Anxiety disorder due to medical condition     Past Surgical History:   Procedure Laterality Date     ANGIOGRAM  10/07    coronary=mild, diffuse CAD; stent below patent     ANGIOPLASTY  9/07    stent to ostia of LAD     SURGICAL HISTORY OF -       Appendectomy       Social History   Substance Use Topics     Smoking status: Former Smoker     Quit date: 1/1/1949     Smokeless tobacco: Never Used     Alcohol use Yes      Comment: occasionally     Family History   Problem Relation Age of Onset     CEREBROVASCULAR DISEASE Mother      Lipids Mother      DIABETES Father      CEREBROVASCULAR DISEASE Father      DIABETES Brother      HEART DISEASE Brother          Current Outpatient Prescriptions   Medication Sig Dispense Refill     atorvastatin (LIPITOR) 20 MG tablet Take 1 tablet (20 mg) by mouth daily 90 tablet 3     clopidogrel (PLAVIX) 75 MG tablet Take 1 tablet (75 mg) by mouth daily 90 tablet 3     lisinopril (PRINIVIL/ZESTRIL) 2.5 MG tablet Take 1 tablet (2.5 mg) by mouth daily 90 tablet 3     metoprolol (TOPROL-XL) 25 MG 24 hr tablet Take 1 tablet (25 mg) by mouth daily 90 tablet 3     amLODIPine (NORVASC) 5 MG tablet Take 1 tablet (5 mg) by mouth daily 90 tablet 3     albuterol (ALBUTEROL) 108 (90 BASE) MCG/ACT Inhaler Inhale 2 puffs into the lungs every 4 hours as needed for shortness of breath / dyspnea Rinse mouthpiece weekly. 1 Inhaler 11     ranitidine (ZANTAC) 150 MG tablet Take 1 tablet (150 mg) by mouth daily as needed for heartburn 90 tablet 2     nitroglycerin (NITROSTAT) 0.4 MG SL tablet Place 1 tablet (0.4 mg) under the tongue every 5 minutes as needed for chest pain for chest pain 25 tablet 3     No Known Allergies      Reviewed and updated as needed this visit by clinical staffTobacco  Allergies  Med Hx  Surg Hx  Fam Hx  Soc Hx  "     Reviewed and updated as needed this visit by Provider         ROS:  C: NEGATIVE for fever, chills, change in weight  I: NEGATIVE for worrisome rashes, moles or lesions  E: NEGATIVE for vision changes or irritation  E/M: NEGATIVE for ear, mouth and throat problems  R: NEGATIVE for significant cough or SOB  CV: NEGATIVE for chest pain, palpitations or peripheral edema  GI: NEGATIVE for nausea, abdominal pain, heartburn, or change in bowel habits  : NEGATIVE for frequency, dysuria, or hematuria  M: NEGATIVE for significant arthralgias or myalgia  NEURO: see above  E: NEGATIVE for temperature intolerance, skin/hair changes  H: NEGATIVE for bleeding problems  PSYCHIATRIC: see above    OBJECTIVE:                                                    /79  Pulse 63  Temp 98.3  F (36.8  C) (Tympanic)  Ht 5' 3.5\" (1.613 m)  Wt 111 lb 6.4 oz (50.5 kg)  SpO2 97%  BMI 19.42 kg/m2  Body mass index is 19.42 kg/(m^2).  GENERAL: healthy, alert and no distress  EYES: no icterus, PERRLA  NECK: no tenderness, no adenopathy,  Thyroid not enlarged  RESP: lungs clear to auscultation - no rales, no rhonchi, no wheezes  CV: regular rates and rhythm, no murmur  MS: no edema  SKIN: good turgor, no rash/jaundice  NEURO: strength and tone- normal, sensory exam- grossly normal, mentation- intact, speech- normal, reflexes- symmetric  PSYCH: well-kempt, linear thought process, normal speech, good insight/judgement, slightly anxious mood, appropriate affect, no suicidality, no aggression, no hallucination  ABD:  nontender  Mini-Mental Status Exam: 24/30 - incorrect year, 3 item recall, wrong \"No ifs ands or buts\".    Diagnostic test results:  Diagnostic Test Results:  none        ASSESSMENT/PLAN:                                                      ICD-10-CM    1. Anxiety disorder due to medical condition F06.4 Discussed with patient, spouse and daughter his symptoms are difficult to ascertain if purely psychiatric in origin or if " more a manifestation of his dementia, particularly the repetitive preoccupation to certain complaints/symptoms.  Per daughter, patient has had anxiety in the past - she asked if med treatment for anxiety is apporpriate.  They were all advised on pros and cons of medical treatment of anxiety in the geriatric population.  While patient may have benefit in improving worries, it may not affect the cyclical complaints even after explanation of his symptoms, which appears to be related to his dementia.  Patient is able to understand general explanation of al this, and spouse and daughter allowed him to choose treatment.  He declined med for now. Family concurred for now.  Can consider again in 1-2 months if continues.  Patient is not identified as suicidal.     2. POLST (Physician Orders for Life-Sustaining Treatment) Z78.9 Patient was determined to have mild cognitive impairent but able to understand basic concepts/items of the POLST and able to sign his name.  Documented his wishes with spouse and daughter with him in room.  They verified that he has expressed same choices with them when they had a family meeting at home.     3. Need for prophylactic vaccination and inoculation against influenza Z23 FLU VACCINE, INCREASED ANTIGEN, PRESV FREE, AGE 65+ [35115]     ADMIN INFLUENZA (For MEDICARE Patients ONLY) []     Total time: 30 mins, more than 50 % spent in counseling above    Follow up with Provider - 1-2 months   Patient Instructions   You declined medication for anxiety for now.  It will be helpful to keep yourself busy with leisure activities, exercise as you tolerate and activities to help your mental capacity.    If still struggling with frequent anxiety or worry about your symptoms, see provider again in next 1-2 months.    POLST form has been signed today per your wishes.      Brain Perez MD  [facility]  Injectable Influenza Immunization Documentation    1.  Is the person to be  vaccinated sick today?   No    2. Does the person to be vaccinated have an allergy to a component   of the vaccine?   No  Egg Allergy Algorithm Link    3. Has the person to be vaccinated ever had a serious reaction   to influenza vaccine in the past?   No    4. Has the person to be vaccinated ever had Guillain-Barré syndrome?   No    Form completed by Laquita Umanzor CMA

## 2017-12-05 ENCOUNTER — OFFICE VISIT (OUTPATIENT)
Dept: FAMILY MEDICINE | Facility: CLINIC | Age: 82
End: 2017-12-05
Payer: COMMERCIAL

## 2017-12-05 VITALS
HEIGHT: 64 IN | DIASTOLIC BLOOD PRESSURE: 64 MMHG | BODY MASS INDEX: 18.71 KG/M2 | OXYGEN SATURATION: 97 % | SYSTOLIC BLOOD PRESSURE: 131 MMHG | TEMPERATURE: 97.4 F | WEIGHT: 109.6 LBS | HEART RATE: 81 BPM

## 2017-12-05 DIAGNOSIS — R09.82 POSTNASAL DRIP: Primary | ICD-10-CM

## 2017-12-05 PROCEDURE — 99213 OFFICE O/P EST LOW 20 MIN: CPT | Performed by: FAMILY MEDICINE

## 2017-12-05 RX ORDER — FLUTICASONE PROPIONATE 50 MCG
1-2 SPRAY, SUSPENSION (ML) NASAL DAILY
Qty: 16 G | Refills: 0 | Status: SHIPPED | OUTPATIENT
Start: 2017-12-05 | End: 2019-01-01

## 2017-12-05 NOTE — MR AVS SNAPSHOT
After Visit Summary   12/5/2017    Evin Melgar    MRN: [id number]           Patient Information     Date Of Birth          6/1/1934        Visit Information        Provider Department      12/5/2017 1:20 PM Brian Perez MD [facility]        Today's Diagnoses     Postnasal drip    -  1      Care Instructions    Exam findings today suport cough due to postnasal drainage into your throat at night.  Start Flonase nasal spray 1 spray to each nostril daily for 3-4 weeks, then may gradually decrease use after symptoms have resolved.   Try using 2-3 pillows to prop head and upper body up when sleeping.    If with fever, shortness of breath or constant coughing up of phlegm all day, see provider again.          Follow-ups after your visit        Who to contact     If you have questions or need follow up information about today's clinic visit or your schedule please contact [facility] directly at [phone].  Normal or non-critical lab and imaging results will be communicated to you by [email or web address], letter or phone within 4 business days after the clinic has received the results. If you do not hear from us within 7 days, please contact the clinic through [email or web address] or phone. If you have a critical or abnormal lab result, we will notify you by phone as soon as possible.  Submit refill requests through [employer] or call your pharmacy and they will forward the refill request to us. Please allow 3 business days for your refill to be completed.          Additional Information About Your Visit        [email or web address] Information     [employer] gives you secure access to your electronic health record. If you see a primary care provider, you can also send messages to your care team and make appointments. If you have questions, please call your primary care clinic.  If you do not have a primary care provider, please call [phone] and they will assist you.        Care EveryWhere ID     This  "is your Care EveryWhere ID. This could be used by other organizations to access your [place] medical records  [id number]        Your Vitals Were     Pulse Temperature Height Pulse Oximetry BMI (Body Mass Index)       81 97.4  F (36.3  C) (Tympanic) 5' 3.5\" (1.613 m) 97% 19.11 kg/m2        Blood Pressure from Last 3 Encounters:   12/05/17 131/64   11/13/17 135/79   09/28/17 138/65    Weight from Last 3 Encounters:   12/05/17 109 lb 9.6 oz (49.7 kg)   11/13/17 111 lb 6.4 oz (50.5 kg)   09/28/17 111 lb (50.3 kg)              Today, you had the following     No orders found for display         Today's Medication Changes          These changes are accurate as of: 12/5/17  2:19 PM.  If you have any questions, ask your nurse or doctor.               Start taking these medicines.        Dose/Directions    fluticasone 50 MCG/ACT spray   Commonly known as:  FLONASE   Used for:  Postnasal drip   Started by:  Brian Perez MD        Dose:  1-2 spray   Spray 1-2 sprays into both nostrils daily   Quantity:  16 g   Refills:  0            Where to get your medicines      These medications were sent to [place] [id number]     Phone:  [phone]     fluticasone 50 MCG/ACT spray                Primary Care Provider Office Phone # Fax #    Brian Perez [phone] [address] [facility] [address]        Equal Access to Services     LEANDER MENJIVAR AH: Hadgisella Daniels, waaxda luqadaha, qaybta kaalwestley shaw. So [facility] [phone].    ATENCIÓN: Si habla español, tiene a vargas disposición servicios gratuitos de asistencia lingüística. Llame al [phone].    We comply with applicable federal civil rights laws and [place] laws. We do not discriminate on the basis of race, color, national origin, age, disability, sex, sexual orientation, " or gender identity.            Thank you!     Thank you for choosing [facility]  for your care. Our goal is always to provide you with excellent care. Hearing back from our patients is one way we can continue to improve our services. Please take a few minutes to complete the written survey that you may receive in the mail after your visit with us. Thank you!             Your Updated Medication List - Protect others around you: Learn how to safely use, store and throw away your medicines at www.disposemymeds.org.          This list is accurate as of: 12/5/17  2:19 PM.  Always use your most recent med list.                   Brand Name Dispense Instructions for use Diagnosis    albuterol 108 (90 BASE) MCG/ACT Inhaler    PROAIR HFA    1 Inhaler    Inhale 2 puffs into the lungs every 4 hours as needed for shortness of breath / dyspnea Rinse mouthpiece weekly.    Moderate persistent asthma without complication       amLODIPine 5 MG tablet    NORVASC    90 tablet    Take 1 tablet (5 mg) by mouth daily    Essential hypertension with goal blood pressure less than 140/90       atorvastatin 20 MG tablet    LIPITOR    90 tablet    Take 1 tablet (20 mg) by mouth daily    Hyperlipidemia LDL goal <100       clopidogrel 75 MG tablet    PLAVIX    90 tablet    Take 1 tablet (75 mg) by mouth daily    Coronary artery disease involving native coronary artery, angina presence unspecified, unspecified whether native or transplanted heart       fluticasone 50 MCG/ACT spray    FLONASE    16 g    Spray 1-2 sprays into both nostrils daily    Postnasal drip       lisinopril 2.5 MG tablet    PRINIVIL/Zestril    90 tablet    Take 1 tablet (2.5 mg) by mouth daily    Essential hypertension with goal blood pressure less than 140/90       metoprolol 25 MG 24 hr tablet    TOPROL-XL    90 tablet    Take 1 tablet (25 mg) by mouth daily    Syncope and collapse       nitroGLYcerin 0.4 MG sublingual tablet    NITROSTAT    25 tablet    Place  1 tablet (0.4 mg) under the tongue every 5 minutes as needed for chest pain for chest pain    CAD (coronary artery disease)       ranitidine 150 MG tablet    ZANTAC    90 tablet    Take 1 tablet (150 mg) by mouth daily as needed for heartburn    GERD (gastroesophageal reflux disease)

## 2017-12-05 NOTE — PROGRESS NOTES
SUBJECTIVE:   Evin Melgar is a 83 year old male who presents to clinic today for the following health issues:  Chief Complaint   Patient presents with     Cough     Pt has had a cough for the past 3 wks, got a little better than worse again this past wk.         ENT Symptoms             Symptoms: cc Present Absent Comment   Fever/Chills   x    Fatigue  x     Muscle Aches  x  little   Eye Irritation   x    Sneezing   x    Nasal Simba/Drg   x    Sinus Pressure/Pain   x    Loss of smell  x     Dental pain   x    Sore Throat   x    Swollen Glands   x    Ear Pain/Fullness   x    Cough x   Coughing up a lot of sputum, very hard to sleep at night   Wheeze   x    Chest Pain  x     Shortness of breath   x    Rash   x    Other         Symptom duration:  3 wks, worse this past wk, cough worsened lats week.   Symptom severity:  mild during the day, severe at night   Treatments tried:  robitussin cough syurp   Contacts:  daughter and granddaughter also have colds      Verified above history with patient.      Problem list and histories reviewed & adjusted, as indicated.  Additional history: as documented    Patient Active Problem List   Diagnosis     Hypertension     Mild persistent asthma without complication     CAD (coronary artery disease)     Tubular adenoma     CKD (chronic kidney disease), stage III     GERD (gastroesophageal reflux disease)     Hyperlipidemia LDL goal <100     Advanced directives, counseling/discussion     OAB (overactive bladder)     Microscopic hematuria     Memory deficit     Health Care Home     COPD, mild (H)     Pain of left upper arm     Nocturia     POLST (Physician Orders for Life-Sustaining Treatment)     Anxiety disorder due to medical condition     Past Surgical History:   Procedure Laterality Date     ANGIOGRAM  10/07    coronary=mild, diffuse CAD; stent below patent     ANGIOPLASTY  9/07    stent to ostia of LAD     SURGICAL HISTORY OF -       Appendectomy       Social History    Substance Use Topics     Smoking status: Former Smoker     Quit date: 1/1/1949     Smokeless tobacco: Never Used     Alcohol use Yes      Comment: occasionally     Family History   Problem Relation Age of Onset     CEREBROVASCULAR DISEASE Mother      Lipids Mother      DIABETES Father      CEREBROVASCULAR DISEASE Father      DIABETES Brother      HEART DISEASE Brother          Current Outpatient Prescriptions   Medication Sig Dispense Refill     fluticasone (FLONASE) 50 MCG/ACT spray Spray 1-2 sprays into both nostrils daily 16 g 0     atorvastatin (LIPITOR) 20 MG tablet Take 1 tablet (20 mg) by mouth daily 90 tablet 3     clopidogrel (PLAVIX) 75 MG tablet Take 1 tablet (75 mg) by mouth daily 90 tablet 3     lisinopril (PRINIVIL/ZESTRIL) 2.5 MG tablet Take 1 tablet (2.5 mg) by mouth daily 90 tablet 3     metoprolol (TOPROL-XL) 25 MG 24 hr tablet Take 1 tablet (25 mg) by mouth daily 90 tablet 3     amLODIPine (NORVASC) 5 MG tablet Take 1 tablet (5 mg) by mouth daily 90 tablet 3     albuterol (ALBUTEROL) 108 (90 BASE) MCG/ACT Inhaler Inhale 2 puffs into the lungs every 4 hours as needed for shortness of breath / dyspnea Rinse mouthpiece weekly. 1 Inhaler 11     ranitidine (ZANTAC) 150 MG tablet Take 1 tablet (150 mg) by mouth daily as needed for heartburn 90 tablet 2     nitroglycerin (NITROSTAT) 0.4 MG SL tablet Place 1 tablet (0.4 mg) under the tongue every 5 minutes as needed for chest pain for chest pain 25 tablet 3     No Known Allergies      Reviewed and updated as needed this visit by clinical staffTobacco  Allergies  Med Hx  Surg Hx  Fam Hx  Soc Hx      Reviewed and updated as needed this visit by Provider         ROS:  C: NEGATIVE for fever, chills, or change in weight  I: NEGATIVE for worrisome rashes, moles or lesions  E: NEGATIVE for vision changes or irritation  ENT/MOUTH: see above  RESP:as above  CV: NEGATIVE for chest pain, palpitations or peripheral edema  GI: NEGATIVE for nausea, abdominal  "pain, heartburn, or change in bowel habits  M: NEGATIVE for significant arthralgias or myalgia    OBJECTIVE:                                                    /64  Pulse 81  Temp 97.4  F (36.3  C) (Tympanic)  Ht 5' 3.5\" (1.613 m)  Wt 109 lb 9.6 oz (49.7 kg)  SpO2 97%  BMI 19.11 kg/m2  Body mass index is 19.11 kg/(m^2).  GENERAL:  alert and no distress  EYES: pink conjunctivae, no icterus  NECK: mild cervical adenopathy, nontender  HEENT: tympanic membrane intact and pearly bilaterally, nose with mild congestion and small amt of clear mucus, no sinus tenderness, throat not erythematous with active clear potnasal drainage, no oral ulcers  RESP: lungs clear to auscultation - no crackles, no rales, no rhonchi, no wheezes  CV: regular rates and rhythm, normal S1 S2, no S3 or S4 and no murmur  SKIN:  Good turgor, no rashes    Diagnostic test results:  Diagnostic Test Results:  none        ASSESSMENT/PLAN:                                                        ICD-10-CM    1. Postnasal drip R09.82 fluticasone (FLONASE) 50 MCG/ACT spray     Patient and spouse were advised no sign of bacterial infection today.  Discussed findings consistent with postnasal drip causing cough mostly when supine.  Discussed use of fluticasone nasal spray for a few weeks.  Sleeping position changes discussed.  Push oral fluids discussed with patient and spouse.  Return precautions discussed and given to patient and spouse.    Follow up with Provider - prn   Patient Instructions   Exam findings today suport cough due to postnasal drainage into your throat at night.  Start Flonase nasal spray 1 spray to each nostril daily for 3-4 weeks, then may gradually decrease use after symptoms have resolved.   Try using 2-3 pillows to prop head and upper body up when sleeping.    If with fever, shortness of breath or constant coughing up of phlegm all day, see provider again.      Brian Perez MD  [facility]  "

## 2017-12-05 NOTE — NURSING NOTE
"Chief Complaint   Patient presents with     Cough     Pt has had a cough for the past 3 wks, got a little better than worse again this past wk.       Initial /64  Pulse 81  Temp 97.4  F (36.3  C) (Tympanic)  Ht 5' 3.5\" (1.613 m)  Wt 109 lb 9.6 oz (49.7 kg)  SpO2 97%  BMI 19.11 kg/m2 Estimated body mass index is 19.11 kg/(m^2) as calculated from the following:    Height as of this encounter: 5' 3.5\" (1.613 m).    Weight as of this encounter: 109 lb 9.6 oz (49.7 kg).  Medication Reconciliation: complete  Laquita Umanzor CMA    "

## 2017-12-05 NOTE — PATIENT INSTRUCTIONS
Exam findings today suport cough due to postnasal drainage into your throat at night.  Start Flonase nasal spray 1 spray to each nostril daily for 3-4 weeks, then may gradually decrease use after symptoms have resolved.   Try using 2-3 pillows to prop head and upper body up when sleeping.    If with fever, shortness of breath or constant coughing up of phlegm all day, see provider again.

## 2017-12-06 ASSESSMENT — ASTHMA QUESTIONNAIRES: ACT_TOTALSCORE: 12

## 2017-12-18 DIAGNOSIS — J45.40 MODERATE PERSISTENT ASTHMA WITHOUT COMPLICATION: ICD-10-CM

## 2017-12-20 RX ORDER — ALBUTEROL SULFATE 90 UG/1
2 AEROSOL, METERED RESPIRATORY (INHALATION) EVERY 4 HOURS PRN
Qty: 1 INHALER | Refills: 11 | Status: SHIPPED | OUTPATIENT
Start: 2017-12-20 | End: 2018-01-01

## 2017-12-20 NOTE — TELEPHONE ENCOUNTER
Routing refill request to provider for review/approval because:  Asthma control test <20.    Thank you  Anitra MAYS RN

## 2017-12-27 ENCOUNTER — CARE COORDINATION (OUTPATIENT)
Dept: CARE COORDINATION | Facility: CLINIC | Age: 82
End: 2017-12-27

## 2017-12-27 NOTE — PROGRESS NOTES
Clinic Care Coordination Contact  [facility]/Voicemail    Referral Source: PCP  Clinical Data: Care Coordinator Outreach  Outreach attempted x 2.  Left message on voicemail with call back information and requested return call.  Plan:  Care Coordinator will await pt's return call and try to reach patient again in 4 weeks or sooner if needed.    GUIDO Adams  [facility]   [phone]  [email or web address]

## 2018-01-01 ENCOUNTER — OFFICE VISIT (OUTPATIENT)
Dept: NEUROLOGY | Facility: CLINIC | Age: 83
End: 2018-01-01
Payer: COMMERCIAL

## 2018-01-01 ENCOUNTER — OFFICE VISIT (OUTPATIENT)
Dept: CARDIOLOGY | Facility: CLINIC | Age: 83
End: 2018-01-01
Attending: INTERNAL MEDICINE
Payer: COMMERCIAL

## 2018-01-01 ENCOUNTER — OFFICE VISIT (OUTPATIENT)
Dept: FAMILY MEDICINE | Facility: CLINIC | Age: 83
End: 2018-01-01
Payer: COMMERCIAL

## 2018-01-01 ENCOUNTER — TELEPHONE (OUTPATIENT)
Dept: NEUROLOGY | Facility: CLINIC | Age: 83
End: 2018-01-01

## 2018-01-01 ENCOUNTER — MYC MEDICAL ADVICE (OUTPATIENT)
Dept: NEUROLOGY | Facility: CLINIC | Age: 83
End: 2018-01-01

## 2018-01-01 VITALS
DIASTOLIC BLOOD PRESSURE: 66 MMHG | SYSTOLIC BLOOD PRESSURE: 118 MMHG | WEIGHT: 105 LBS | HEART RATE: 69 BPM | BODY MASS INDEX: 18.31 KG/M2 | OXYGEN SATURATION: 99 %

## 2018-01-01 VITALS
BODY MASS INDEX: 17.64 KG/M2 | WEIGHT: 103.31 LBS | SYSTOLIC BLOOD PRESSURE: 123 MMHG | OXYGEN SATURATION: 94 % | TEMPERATURE: 97.4 F | HEIGHT: 64 IN | DIASTOLIC BLOOD PRESSURE: 64 MMHG | HEART RATE: 71 BPM | RESPIRATION RATE: 16 BRPM

## 2018-01-01 VITALS
HEART RATE: 75 BPM | TEMPERATURE: 98.5 F | BODY MASS INDEX: 18.54 KG/M2 | HEIGHT: 64 IN | DIASTOLIC BLOOD PRESSURE: 60 MMHG | WEIGHT: 108.6 LBS | RESPIRATION RATE: 16 BRPM | SYSTOLIC BLOOD PRESSURE: 133 MMHG

## 2018-01-01 DIAGNOSIS — J45.40 MODERATE PERSISTENT ASTHMA WITHOUT COMPLICATION: ICD-10-CM

## 2018-01-01 DIAGNOSIS — J45.20 MILD INTERMITTENT ASTHMA WITHOUT COMPLICATION: ICD-10-CM

## 2018-01-01 DIAGNOSIS — I10 ESSENTIAL HYPERTENSION WITH GOAL BLOOD PRESSURE LESS THAN 140/90: ICD-10-CM

## 2018-01-01 DIAGNOSIS — F02.818 LATE ONSET ALZHEIMER'S DISEASE WITH BEHAVIORAL DISTURBANCE (H): ICD-10-CM

## 2018-01-01 DIAGNOSIS — G30.1 LATE ONSET ALZHEIMER'S DISEASE WITH BEHAVIORAL DISTURBANCE (H): ICD-10-CM

## 2018-01-01 DIAGNOSIS — R05.9 COUGHING: ICD-10-CM

## 2018-01-01 DIAGNOSIS — R55 SYNCOPE AND COLLAPSE: ICD-10-CM

## 2018-01-01 DIAGNOSIS — I25.10 CORONARY ARTERY DISEASE INVOLVING NATIVE CORONARY ARTERY OF NATIVE HEART WITHOUT ANGINA PECTORIS: ICD-10-CM

## 2018-01-01 DIAGNOSIS — H61.22 EXCESSIVE CERUMEN IN LEFT EAR CANAL: ICD-10-CM

## 2018-01-01 DIAGNOSIS — R19.7 DIARRHEA, UNSPECIFIED TYPE: Primary | ICD-10-CM

## 2018-01-01 DIAGNOSIS — G30.1 LATE ONSET ALZHEIMER'S DISEASE WITH BEHAVIORAL DISTURBANCE (H): Primary | ICD-10-CM

## 2018-01-01 DIAGNOSIS — E78.5 HYPERLIPIDEMIA LDL GOAL <100: ICD-10-CM

## 2018-01-01 DIAGNOSIS — I25.10 CORONARY ARTERY DISEASE INVOLVING NATIVE CORONARY ARTERY OF NATIVE HEART WITHOUT ANGINA PECTORIS: Primary | ICD-10-CM

## 2018-01-01 DIAGNOSIS — F02.818 LATE ONSET ALZHEIMER'S DISEASE WITH BEHAVIORAL DISTURBANCE (H): Primary | ICD-10-CM

## 2018-01-01 DIAGNOSIS — Z23 NEED FOR PROPHYLACTIC VACCINATION AND INOCULATION AGAINST INFLUENZA: ICD-10-CM

## 2018-01-01 LAB
ALT SERPL W P-5'-P-CCNC: 20 U/L (ref 0–70)
ANION GAP SERPL CALCULATED.3IONS-SCNC: 10 MMOL/L (ref 3–14)
BUN SERPL-MCNC: 24 MG/DL (ref 7–30)
CALCIUM SERPL-MCNC: 8.2 MG/DL (ref 8.5–10.1)
CHLORIDE SERPL-SCNC: 109 MMOL/L (ref 94–109)
CHOLEST SERPL-MCNC: 104 MG/DL
CO2 SERPL-SCNC: 25 MMOL/L (ref 20–32)
CREAT SERPL-MCNC: 1.27 MG/DL (ref 0.66–1.25)
GFR SERPL CREATININE-BSD FRML MDRD: 54 ML/MIN/1.7M2
GLUCOSE SERPL-MCNC: 97 MG/DL (ref 70–99)
HDLC SERPL-MCNC: 50 MG/DL
LDLC SERPL CALC-MCNC: 38 MG/DL
NONHDLC SERPL-MCNC: 54 MG/DL
POTASSIUM SERPL-SCNC: 3 MMOL/L (ref 3.4–5.3)
SODIUM SERPL-SCNC: 144 MMOL/L (ref 133–144)
TRIGL SERPL-MCNC: 81 MG/DL

## 2018-01-01 PROCEDURE — 90662 IIV NO PRSV INCREASED AG IM: CPT | Performed by: FAMILY MEDICINE

## 2018-01-01 PROCEDURE — 84460 ALANINE AMINO (ALT) (SGPT): CPT | Performed by: INTERNAL MEDICINE

## 2018-01-01 PROCEDURE — 99214 OFFICE O/P EST MOD 30 MIN: CPT | Performed by: INTERNAL MEDICINE

## 2018-01-01 PROCEDURE — G0008 ADMIN INFLUENZA VIRUS VAC: HCPCS | Performed by: FAMILY MEDICINE

## 2018-01-01 PROCEDURE — 99215 OFFICE O/P EST HI 40 MIN: CPT | Performed by: PSYCHIATRY & NEUROLOGY

## 2018-01-01 PROCEDURE — 80048 BASIC METABOLIC PNL TOTAL CA: CPT | Performed by: INTERNAL MEDICINE

## 2018-01-01 PROCEDURE — 99214 OFFICE O/P EST MOD 30 MIN: CPT | Mod: 25 | Performed by: FAMILY MEDICINE

## 2018-01-01 PROCEDURE — 69210 REMOVE IMPACTED EAR WAX UNI: CPT | Mod: LT | Performed by: FAMILY MEDICINE

## 2018-01-01 PROCEDURE — 80061 LIPID PANEL: CPT | Performed by: INTERNAL MEDICINE

## 2018-01-01 PROCEDURE — 36415 COLL VENOUS BLD VENIPUNCTURE: CPT | Performed by: INTERNAL MEDICINE

## 2018-01-01 RX ORDER — ALBUTEROL SULFATE 90 UG/1
2 AEROSOL, METERED RESPIRATORY (INHALATION) EVERY 4 HOURS PRN
Qty: 1 INHALER | Refills: 0 | Status: SHIPPED | OUTPATIENT
Start: 2018-01-01 | End: 2019-01-01

## 2018-01-01 ASSESSMENT — ASTHMA QUESTIONNAIRES: ACT_TOTALSCORE: 10

## 2018-01-16 ENCOUNTER — OFFICE VISIT (OUTPATIENT)
Dept: FAMILY MEDICINE | Facility: CLINIC | Age: 83
End: 2018-01-16
Payer: COMMERCIAL

## 2018-01-16 VITALS
DIASTOLIC BLOOD PRESSURE: 78 MMHG | SYSTOLIC BLOOD PRESSURE: 135 MMHG | OXYGEN SATURATION: 99 % | HEIGHT: 64 IN | BODY MASS INDEX: 19.22 KG/M2 | HEART RATE: 64 BPM | TEMPERATURE: 97.9 F | WEIGHT: 112.6 LBS

## 2018-01-16 DIAGNOSIS — Z23 NEED FOR PROPHYLACTIC VACCINATION AGAINST STREPTOCOCCUS PNEUMONIAE (PNEUMOCOCCUS): ICD-10-CM

## 2018-01-16 DIAGNOSIS — J06.9 VIRAL URI WITH COUGH: Primary | ICD-10-CM

## 2018-01-16 DIAGNOSIS — K21.9 GASTROESOPHAGEAL REFLUX DISEASE WITHOUT ESOPHAGITIS: ICD-10-CM

## 2018-01-16 PROCEDURE — 90670 PCV13 VACCINE IM: CPT | Performed by: FAMILY MEDICINE

## 2018-01-16 PROCEDURE — G0009 ADMIN PNEUMOCOCCAL VACCINE: HCPCS | Performed by: FAMILY MEDICINE

## 2018-01-16 PROCEDURE — 99214 OFFICE O/P EST MOD 30 MIN: CPT | Mod: 25 | Performed by: FAMILY MEDICINE

## 2018-01-16 RX ORDER — GUAIFENESIN 200 MG/1
400 TABLET ORAL EVERY 6 HOURS PRN
Qty: 60 TABLET | Refills: 0 | Status: SHIPPED | OUTPATIENT
Start: 2018-01-16 | End: 2018-01-01

## 2018-01-16 NOTE — NURSING NOTE
Screening Questionnaire for Adult Immunization    Are you sick today?   No   Do you have allergies to medications, food, a vaccine component or latex?   No   Have you ever had a serious reaction after receiving a vaccination?   No   Do you have a long-term health problem with heart disease, lung disease, asthma, kidney disease, metabolic disease (e.g. diabetes), anemia, or other blood disorder?   Yes   Do you have cancer, leukemia, HIV/AIDS, or any other immune system problem?   No   In the past 3 months, have you taken medications that affect  your immune system, such as prednisone, other steroids, or anticancer drugs; drugs for the treatment of rheumatoid arthritis, Crohn s disease, or psoriasis; or have you had radiation treatments?   No   Have you had a seizure, or a brain or other nervous system problem?   No   During the past year, have you received a transfusion of blood or blood     products, or been given immune (gamma) globulin or antiviral drug?   No   For women: Are you pregnant or is there a chance you could become        pregnant during the next month?   No   Have you received any vaccinations in the past 4 weeks?   No     Immunization questionnaire was positive for at least one answer.  Ok per guidelines for Prevnar 13.        Per orders of Dr. Perez, injection of Prevnar 13  given by Luis Enrique Campbell. Patient instructed to remain in clinic for 15 minutes afterwards, and to report any adverse reaction to me immediately.       Screening performed by Luis Enrique Campbell on 1/16/2018 at 2:52 PM.

## 2018-01-16 NOTE — NURSING NOTE
"Chief Complaint   Patient presents with     Cough     Pt here for cough.     Refill Request     Pt would like refill on ranitadine.       Initial /78  Pulse 64  Temp 97.9  F (36.6  C) (Tympanic)  Ht 5' 3.5\" (1.613 m)  Wt 112 lb 9.6 oz (51.1 kg)  SpO2 99%  BMI 19.63 kg/m2 Estimated body mass index is 19.63 kg/(m^2) as calculated from the following:    Height as of this encounter: 5' 3.5\" (1.613 m).    Weight as of this encounter: 112 lb 9.6 oz (51.1 kg).  Medication Reconciliation: complete  Laquita Umanzor CMA    "

## 2018-01-16 NOTE — PROGRESS NOTES
SUBJECTIVE:   Evin Melgar is a 83 year old male who presents to clinic today for the following health issues:  Chief Complaint   Patient presents with     Cough     Pt here for cough.     Refill Request     Pt would like refill on ranitadine.       ENT Symptoms             Symptoms: cc Present Absent Comment   Fever/Chills   x    Fatigue  x     Muscle Aches   x    Eye Irritation   x    Sneezing  x     Nasal Simba/Drg  x  drainage   Sinus Pressure/Pain   x    Loss of smell  x     Dental pain   x    Sore Throat   x    Swollen Glands   x    Ear Pain/Fullness   x    Cough x   Worse at night and in the morning, coughing episodes   Wheeze x      Chest Pain   x    Shortness of breath   x    Rash   x    Other         Symptom duration:  3 days   Symptom severity:  moderate   Treatments tried:  none   Contacts: Spouse had similar symptoms last week - spouse states she feels better now.     Verified above history with patient.      Problem list and histories reviewed & adjusted, as indicated.  Additional history: as documented    Patient Active Problem List   Diagnosis     Hypertension     Mild persistent asthma without complication     CAD (coronary artery disease)     Tubular adenoma     CKD (chronic kidney disease), stage III     GERD (gastroesophageal reflux disease)     Hyperlipidemia LDL goal <100     Advanced directives, counseling/discussion     OAB (overactive bladder)     Microscopic hematuria     Memory deficit     Health Care Home     COPD, mild (H)     Pain of left upper arm     Nocturia     POLST (Physician Orders for Life-Sustaining Treatment)     Anxiety disorder due to medical condition     Past Surgical History:   Procedure Laterality Date     ANGIOGRAM  10/07    coronary=mild, diffuse CAD; stent below patent     ANGIOPLASTY  9/07    stent to ostia of LAD     SURGICAL HISTORY OF -       Appendectomy       Social History   Substance Use Topics     Smoking status: Former Smoker     Quit date: 1/1/1949      Smokeless tobacco: Never Used     Alcohol use Yes      Comment: occasionally     Family History   Problem Relation Age of Onset     CEREBROVASCULAR DISEASE Mother      Lipids Mother      DIABETES Father      CEREBROVASCULAR DISEASE Father      DIABETES Brother      HEART DISEASE Brother          Current Outpatient Prescriptions   Medication Sig Dispense Refill     ranitidine (ZANTAC) 150 MG tablet Take 1 tablet (150 mg) by mouth 2 times daily as needed for heartburn 90 tablet 2     guaiFENesin (ORGANIDIN) 200 MG TABS tablet Take 2 tablets (400 mg) by mouth every 6 hours as needed for cough 60 tablet 0     albuterol (PROAIR HFA) 108 (90 BASE) MCG/ACT Inhaler Inhale 2 puffs into the lungs every 4 hours as needed for shortness of breath / dyspnea Rinse mouthpiece weekly. 1 Inhaler 11     fluticasone (FLONASE) 50 MCG/ACT spray Spray 1-2 sprays into both nostrils daily 16 g 0     atorvastatin (LIPITOR) 20 MG tablet Take 1 tablet (20 mg) by mouth daily 90 tablet 3     clopidogrel (PLAVIX) 75 MG tablet Take 1 tablet (75 mg) by mouth daily 90 tablet 3     lisinopril (PRINIVIL/ZESTRIL) 2.5 MG tablet Take 1 tablet (2.5 mg) by mouth daily 90 tablet 3     metoprolol (TOPROL-XL) 25 MG 24 hr tablet Take 1 tablet (25 mg) by mouth daily 90 tablet 3     amLODIPine (NORVASC) 5 MG tablet Take 1 tablet (5 mg) by mouth daily 90 tablet 3     [DISCONTINUED] ranitidine (ZANTAC) 150 MG tablet Take 1 tablet (150 mg) by mouth daily as needed for heartburn (Patient not taking: Reported on 1/16/2018) 90 tablet 2     nitroglycerin (NITROSTAT) 0.4 MG SL tablet Place 1 tablet (0.4 mg) under the tongue every 5 minutes as needed for chest pain for chest pain 25 tablet 3     No Known Allergies      Reviewed and updated as needed this visit by clinical staffTobacco  Allergies  Meds  Problems  Med Hx  Surg Hx  Fam Hx  Soc Hx        Reviewed and updated as needed this visit by Provider  Allergies  Meds  Problems         ROS:  C: NEGATIVE for  "fever, chills, or change in weight  I: NEGATIVE for worrisome rashes, moles or lesions  E: NEGATIVE for vision changes or irritation  ENT/MOUTH: see above  RESP:as above  CV: NEGATIVE for chest pain, palpitations or peripheral edema  GI: NEGATIVE for nausea, abdominal pain, heartburn, or change in bowel habits  M: NEGATIVE for significant arthralgias or myalgia    OBJECTIVE:                                                    /78  Pulse 64  Temp 97.9  F (36.6  C) (Tympanic)  Ht 5' 3.5\" (1.613 m)  Wt 112 lb 9.6 oz (51.1 kg)  SpO2 99%  BMI 19.63 kg/m2  Body mass index is 19.63 kg/(m^2).  GENERAL: alert and no distress  EYES: pink conjunctivae, no icterus  NECK: mild cervical adenopathy, nontender  HEENT: tympanic membrane intact and pearly bilaterally, nose with mild congestion, no sinus tenderness, throat not erythematous, no exudates, no oral ulcers  RESP: good air entry bilaterally,  lungs clear to auscultation - no rales, no crackles, no rhonchi, no wheezes  CV: regular rates and rhythm, normal S1 S2, no S3 or S4 and no murmur  SKIN:  Good turgor, no rashes    Diagnostic test results:  Diagnostic Test Results:  none        ASSESSMENT/PLAN:                                                        ICD-10-CM    1. Viral URI with cough J06.9 guaiFENesin (ORGANIDIN) 200 MG TABS tablet    B97.89 Discussed symptoms are likely due to viral etiology. Discussed usual course of viral infections; self-limited.   Advised to take plenty of oral fluids. Advised adequate rest. General hygiene.   Due to report of chest congestion and difficulty expectorating, guaifenesin given as above.  Advised to see doctor again if worsening or with new sx.     2. Gastroesophageal reflux disease without esophagitis K21.9 ranitidine (ZANTAC) 150 MG tablet  At end of encounter, spouse requested refill of ranitidine. Patient takes this prn and last Rx was 2 years ago.     3. Need for prophylactic vaccination against Streptococcus " pneumoniae (pneumococcus) Z23 ADMIN 1st VACCINE     Pneumococcal vaccine 13 valent PCV13 IM (Prevnar) [43012]     CANCELED: PNEUMOCOCCAL CONJ VACCINE 13 VALENT IM       Follow up with Provider - 2-3 days if worsening   Patient Instructions   Guaifenesin 400 mg orally every 6 hrs as needed for coughing with phlegm or chest congestion.  Be sure to drink at least 6 glasses of water per day.    Viral Upper Respiratory Illness (Adult)  You have a viral upper respiratory illness (URI), which is another term for the common cold. This illness is contagious during the first few days. It is spread through the air by coughing and sneezing. It may also be spread by direct contact (touching the sick person and then touching your own eyes, nose, or mouth). Frequent handwashing will decrease risk of spread. Most viral illnesses go away within 7 to 10 days with rest and simple home remedies. Sometimes the illness may last for several weeks. Antibiotics will not kill a virus, and they are generally not prescribed for this condition.    Home care    If symptoms are severe, rest at home for the first 2 to 3 days. When you resume activity, don't let yourself get too tired.    Avoid being exposed to cigarette smoke (yours or others ).    You may use acetaminophen or ibuprofen to control pain and fever, unless another medicine was prescribed. (Note: If you have chronic liver or kidney disease, have ever had a stomach ulcer or gastrointestinal bleeding, or are taking blood-thinning medicines, talk with your healthcare provider before using these medicines.) Aspirin should never be given to anyone under 18 years of age who is ill with a viral infection or fever. It may cause severe liver or brain damage.    Your appetite may be poor, so a light diet is fine. Avoid dehydration by drinking 6 to 8 glasses of fluids per day (water, soft drinks, juices, tea, or soup). Extra fluids will help loosen secretions in the nose and  lungs.    Over-the-counter cold medicines will not shorten the length of time you re sick, but they may be helpful for the following symptoms: cough, sore throat, and nasal and sinus congestion. (Note: Do not use decongestants if you have high blood pressure.)  Follow-up care  Follow up with your healthcare provider, or as advised.  When to seek medical advice  Call your healthcare provider right away if any of these occur:    Cough with lots of colored sputum (mucus)    Severe headache; face, neck, or ear pain    Difficulty swallowing due to throat pain    Fever of 100.4 F (38 C)  Call 911, or get immediate medical care  Call emergency services right away if any of these occur:    Chest pain, shortness of breath, wheezing, or difficulty breathing    Coughing up blood    Inability to swallow due to throat pain  Date Last Reviewed: 9/13/2015 2000-2017 [employer]. [address] [place] [address]. All rights reserved. This information is not intended as a substitute for professional medical care. Always follow your healthcare professional's instructions.            Brian Perez MD  [facility]

## 2018-01-16 NOTE — MR AVS SNAPSHOT
After Visit Summary   1/16/2018    Evin Melgar    MRN: [id number]           Patient Information     Date Of Birth          6/1/1934        Visit Information        Provider Department      1/16/2018 2:00 PM Brian Perez MD [facility]        Today's Diagnoses     Viral URI with cough    -  1    Gastroesophageal reflux disease without esophagitis          Care Instructions    Guaifenesin 400 mg orally every 6 hrs as needed for coughing with phlegm or chest congestion.  Be sure to drink at least 6 glasses of water per day.    Viral Upper Respiratory Illness (Adult)  You have a viral upper respiratory illness (URI), which is another term for the common cold. This illness is contagious during the first few days. It is spread through the air by coughing and sneezing. It may also be spread by direct contact (touching the sick person and then touching your own eyes, nose, or mouth). Frequent handwashing will decrease risk of spread. Most viral illnesses go away within 7 to 10 days with rest and simple home remedies. Sometimes the illness may last for several weeks. Antibiotics will not kill a virus, and they are generally not prescribed for this condition.    Home care    If symptoms are severe, rest at home for the first 2 to 3 days. When you resume activity, don't let yourself get too tired.    Avoid being exposed to cigarette smoke (yours or others ).    You may use acetaminophen or ibuprofen to control pain and fever, unless another medicine was prescribed. (Note: If you have chronic liver or kidney disease, have ever had a stomach ulcer or gastrointestinal bleeding, or are taking blood-thinning medicines, talk with your healthcare provider before using these medicines.) Aspirin should never be given to anyone under 18 years of age who is ill with a viral infection or fever. It may cause severe liver or brain damage.    Your appetite may be poor, so a light diet is fine.  Avoid dehydration by drinking 6 to 8 glasses of fluids per day (water, soft drinks, juices, tea, or soup). Extra fluids will help loosen secretions in the nose and lungs.    Over-the-counter cold medicines will not shorten the length of time you re sick, but they may be helpful for the following symptoms: cough, sore throat, and nasal and sinus congestion. (Note: Do not use decongestants if you have high blood pressure.)  Follow-up care  Follow up with your healthcare provider, or as advised.  When to seek medical advice  Call your healthcare provider right away if any of these occur:    Cough with lots of colored sputum (mucus)    Severe headache; face, neck, or ear pain    Difficulty swallowing due to throat pain    Fever of 100.4 F (38 C)  Call 911, or get immediate medical care  Call emergency services right away if any of these occur:    Chest pain, shortness of breath, wheezing, or difficulty breathing    Coughing up blood    Inability to swallow due to throat pain  Date Last Reviewed: 9/13/2015 2000-2017 [employer]. [address] [place] [address]. All rights reserved. This information is not intended as a substitute for professional medical care. Always follow your healthcare professional's instructions.                Follow-ups after your visit        Follow-up notes from your care team     Return if symptoms worsen or fail to improve.      Who to contact     If you have questions or need follow up information about today's clinic visit or your schedule please contact [facility] directly at [phone].  Normal or non-critical lab and imaging results will be communicated to you by MyChart, letter or phone within 4 business days after the clinic has received the results. If you do not hear from us within 7 days, please contact the clinic through MyChart or phone. If you have a critical or abnormal lab result, we will notify you by phone as soon as possible.  Submit  "refill requests through [employer] or call your pharmacy and they will forward the refill request to us. Please allow 3 business days for your refill to be completed.          Additional Information About Your Visit        [employer] Information     [employer] gives you secure access to your electronic health record. If you see a primary care provider, you can also send messages to your care team and make appointments. If you have questions, please call your primary care clinic.  If you do not have a primary care provider, please call [phone] and they will assist you.        Care EveryWhere ID     This is your Care EveryWhere ID. This could be used by other organizations to access your [place] medical records  [id number]        Your Vitals Were     Pulse Temperature Height Pulse Oximetry BMI (Body Mass Index)       64 97.9  F (36.6  C) (Tympanic) 5' 3.5\" (1.613 m) 99% 19.63 kg/m2        Blood Pressure from Last 3 Encounters:   01/16/18 135/78   12/05/17 131/64   11/13/17 135/79    Weight from Last 3 Encounters:   01/16/18 112 lb 9.6 oz (51.1 kg)   12/05/17 109 lb 9.6 oz (49.7 kg)   11/13/17 111 lb 6.4 oz (50.5 kg)              Today, you had the following     No orders found for display         Today's Medication Changes          These changes are accurate as of: 1/16/18  2:40 PM.  If you have any questions, ask your nurse or doctor.               Start taking these medicines.        Dose/Directions    guaiFENesin 200 MG Tabs tablet   Commonly known as:  ORGANIDIN   Used for:  Viral URI with cough   Started by:  Brian Perez MD        Dose:  400 mg   Take 2 tablets (400 mg) by mouth every 6 hours as needed for cough   Quantity:  60 tablet   Refills:  0         These medicines have changed or have updated prescriptions.        Dose/Directions    ranitidine 150 MG tablet   Commonly known as:  ZANTAC   This may have changed:  when to take this   Used for:  Gastroesophageal reflux disease without " esophagitis   Changed by:  Brian Perez MD        Dose:  150 mg   Take 1 tablet (150 mg) by mouth 2 times daily as needed for heartburn   Quantity:  90 tablet   Refills:  2            Where to get your medicines      These medications were sent to [facility] [address] [facility]. [place]  [address] [place] [address]. [facility] [address]     Phone:  [phone]     guaiFENesin 200 MG Tabs tablet    ranitidine 150 MG tablet                Primary Care Provider Office Phone # Fax #    Brian Perez [phone] [address] [facility] [address]        Equal Access to Services     [facility]: Hadii aad ku hadasho Soomaali, waaxda luqadaha, qaybta kaalmada adeegyada, waxjuan ramey . So [facility] [phone].    ATENCIÓN: Si habla español, tiene a vargas disposición servicios gratuitos de asistencia lingüística. Llame al [phone].    We comply with applicable federal civil rights laws and [place] laws. We do not discriminate on the basis of race, color, national origin, age, disability, sex, sexual orientation, or gender identity.            Thank you!     Thank you for choosing [facility]  for your care. Our goal is always to provide you with excellent care. Hearing back from our patients is one way we can continue to improve our services. Please take a few minutes to complete the written survey that you may receive in the mail after your visit with us. Thank you!             Your Updated Medication List - Protect others around you: Learn how to safely use, store and throw away your medicines at www.disposemymeds.org.          This list is accurate as of: 1/16/18  2:40 PM.  Always use your most recent med list.                   Brand Name Dispense Instructions for use Diagnosis    albuterol 108 (90 BASE) MCG/ACT Inhaler    PROAIR HFA    1 Inhaler    Inhale 2 puffs into the lungs every 4 hours as needed for  shortness of breath / dyspnea Rinse mouthpiece weekly.    Moderate persistent asthma without complication       amLODIPine 5 MG tablet    NORVASC    90 tablet    Take 1 tablet (5 mg) by mouth daily    Essential hypertension with goal blood pressure less than 140/90       atorvastatin 20 MG tablet    LIPITOR    90 tablet    Take 1 tablet (20 mg) by mouth daily    Hyperlipidemia LDL goal <100       clopidogrel 75 MG tablet    PLAVIX    90 tablet    Take 1 tablet (75 mg) by mouth daily    Coronary artery disease involving native coronary artery, angina presence unspecified, unspecified whether native or transplanted heart       fluticasone 50 MCG/ACT spray    FLONASE    16 g    Spray 1-2 sprays into both nostrils daily    Postnasal drip       guaiFENesin 200 MG Tabs tablet    ORGANIDIN    60 tablet    Take 2 tablets (400 mg) by mouth every 6 hours as needed for cough    Viral URI with cough       lisinopril 2.5 MG tablet    PRINIVIL/Zestril    90 tablet    Take 1 tablet (2.5 mg) by mouth daily    Essential hypertension with goal blood pressure less than 140/90       metoprolol succinate 25 MG 24 hr tablet    TOPROL-XL    90 tablet    Take 1 tablet (25 mg) by mouth daily    Syncope and collapse       nitroGLYcerin 0.4 MG sublingual tablet    NITROSTAT    25 tablet    Place 1 tablet (0.4 mg) under the tongue every 5 minutes as needed for chest pain for chest pain    CAD (coronary artery disease)       ranitidine 150 MG tablet    ZANTAC    90 tablet    Take 1 tablet (150 mg) by mouth 2 times daily as needed for heartburn    Gastroesophageal reflux disease without esophagitis

## 2018-01-16 NOTE — PATIENT INSTRUCTIONS
Guaifenesin 400 mg orally every 6 hrs as needed for coughing with phlegm or chest congestion.  Be sure to drink at least 6 glasses of water per day.    Viral Upper Respiratory Illness (Adult)  You have a viral upper respiratory illness (URI), which is another term for the common cold. This illness is contagious during the first few days. It is spread through the air by coughing and sneezing. It may also be spread by direct contact (touching the sick person and then touching your own eyes, nose, or mouth). Frequent handwashing will decrease risk of spread. Most viral illnesses go away within 7 to 10 days with rest and simple home remedies. Sometimes the illness may last for several weeks. Antibiotics will not kill a virus, and they are generally not prescribed for this condition.    Home care    If symptoms are severe, rest at home for the first 2 to 3 days. When you resume activity, don't let yourself get too tired.    Avoid being exposed to cigarette smoke (yours or others ).    You may use acetaminophen or ibuprofen to control pain and fever, unless another medicine was prescribed. (Note: If you have chronic liver or kidney disease, have ever had a stomach ulcer or gastrointestinal bleeding, or are taking blood-thinning medicines, talk with your healthcare provider before using these medicines.) Aspirin should never be given to anyone under 18 years of age who is ill with a viral infection or fever. It may cause severe liver or brain damage.    Your appetite may be poor, so a light diet is fine. Avoid dehydration by drinking 6 to 8 glasses of fluids per day (water, soft drinks, juices, tea, or soup). Extra fluids will help loosen secretions in the nose and lungs.    Over-the-counter cold medicines will not shorten the length of time you re sick, but they may be helpful for the following symptoms: cough, sore throat, and nasal and sinus congestion. (Note: Do not use decongestants if you have high blood  pressure.)  Follow-up care  Follow up with your healthcare provider, or as advised.  When to seek medical advice  Call your healthcare provider right away if any of these occur:    Cough with lots of colored sputum (mucus)    Severe headache; face, neck, or ear pain    Difficulty swallowing due to throat pain    Fever of 100.4 F (38 C)  Call 911, or get immediate medical care  Call emergency services right away if any of these occur:    Chest pain, shortness of breath, wheezing, or difficulty breathing    Coughing up blood    Inability to swallow due to throat pain  Date Last Reviewed: 9/13/2015 2000-2017 [employer]. [address] [place] [address], [place] [address]. All rights reserved. This information is not intended as a substitute for professional medical care. Always follow your healthcare professional's instructions.

## 2018-02-05 ENCOUNTER — CARE COORDINATION (OUTPATIENT)
Dept: CARE COORDINATION | Facility: CLINIC | Age: 83
End: 2018-02-05

## 2018-02-05 NOTE — LETTER
[facility] CARE COORDINATION  Brian Perez MD  February 7, 2018    Evin Melgar  [address]    [facility] [address]      Dear Evin,    I am a clinic care coordinator who works with Brian Perez MD. I recently tried to call and was unable to reach you. I wanted to check-in to see how things are going and if there is anything that you need assistance with. I included a flyer with information about how I may be able to help.     Please feel free to contact me at [phone], with any questions or concerns.     Sincerely,     Josefina Orona

## 2018-02-05 NOTE — PROGRESS NOTES
Clinic Care Coordination Contact  [facility]/Voicemail    Referral Source: PCP  Clinical Data: Care Coordinator Outreach  Outreach attempted x 2.  Left message on voicemail with call back information and requested return call. Please see contact log for detailed outreach attempt dates.  Plan: Care Coordinator will mail out care coordination unable to contact letter with care coordinator contact information and explanation of care coordination services. Care Coordinator will do no further outreaches at this time, but will be available to provider and patient if needed.    GUIDO Adams  [facility]   [phone]  [email or web address]

## 2018-03-13 DIAGNOSIS — E78.5 HYPERLIPIDEMIA LDL GOAL <100: ICD-10-CM

## 2018-03-13 DIAGNOSIS — I10 ESSENTIAL HYPERTENSION WITH GOAL BLOOD PRESSURE LESS THAN 140/90: ICD-10-CM

## 2018-03-13 LAB
ALT SERPL W P-5'-P-CCNC: 19 U/L (ref 0–70)
ANION GAP SERPL CALCULATED.3IONS-SCNC: 7 MMOL/L (ref 3–14)
BUN SERPL-MCNC: 18 MG/DL (ref 7–30)
CALCIUM SERPL-MCNC: 8.7 MG/DL (ref 8.5–10.1)
CHLORIDE SERPL-SCNC: 105 MMOL/L (ref 94–109)
CHOLEST SERPL-MCNC: 138 MG/DL
CO2 SERPL-SCNC: 27 MMOL/L (ref 20–32)
CREAT SERPL-MCNC: 1.26 MG/DL (ref 0.66–1.25)
GFR SERPL CREATININE-BSD FRML MDRD: 55 ML/MIN/1.7M2
GLUCOSE SERPL-MCNC: 106 MG/DL (ref 70–99)
HDLC SERPL-MCNC: 64 MG/DL
LDLC SERPL CALC-MCNC: 57 MG/DL
NONHDLC SERPL-MCNC: 74 MG/DL
POTASSIUM SERPL-SCNC: 3.7 MMOL/L (ref 3.4–5.3)
SODIUM SERPL-SCNC: 139 MMOL/L (ref 133–144)
TRIGL SERPL-MCNC: 86 MG/DL

## 2018-03-13 PROCEDURE — 36415 COLL VENOUS BLD VENIPUNCTURE: CPT | Performed by: INTERNAL MEDICINE

## 2018-03-13 PROCEDURE — 80061 LIPID PANEL: CPT | Performed by: INTERNAL MEDICINE

## 2018-03-13 PROCEDURE — 80048 BASIC METABOLIC PNL TOTAL CA: CPT | Performed by: INTERNAL MEDICINE

## 2018-03-13 PROCEDURE — 84460 ALANINE AMINO (ALT) (SGPT): CPT | Performed by: INTERNAL MEDICINE

## 2018-03-14 ENCOUNTER — OFFICE VISIT (OUTPATIENT)
Dept: CARDIOLOGY | Facility: CLINIC | Age: 83
End: 2018-03-14
Attending: INTERNAL MEDICINE
Payer: COMMERCIAL

## 2018-03-14 VITALS
WEIGHT: 111 LBS | OXYGEN SATURATION: 95 % | DIASTOLIC BLOOD PRESSURE: 79 MMHG | HEART RATE: 69 BPM | SYSTOLIC BLOOD PRESSURE: 136 MMHG | BODY MASS INDEX: 19.35 KG/M2

## 2018-03-14 DIAGNOSIS — E78.5 HYPERLIPIDEMIA LDL GOAL <100: ICD-10-CM

## 2018-03-14 DIAGNOSIS — R55 SYNCOPE AND COLLAPSE: ICD-10-CM

## 2018-03-14 DIAGNOSIS — I25.10 CORONARY ARTERY DISEASE INVOLVING NATIVE CORONARY ARTERY OF NATIVE HEART WITHOUT ANGINA PECTORIS: ICD-10-CM

## 2018-03-14 DIAGNOSIS — I10 ESSENTIAL HYPERTENSION WITH GOAL BLOOD PRESSURE LESS THAN 140/90: Primary | ICD-10-CM

## 2018-03-14 PROCEDURE — 99214 OFFICE O/P EST MOD 30 MIN: CPT | Performed by: INTERNAL MEDICINE

## 2018-03-14 RX ORDER — AMLODIPINE BESYLATE 5 MG/1
5 TABLET ORAL DAILY
Qty: 90 TABLET | Refills: 3 | Status: SHIPPED | OUTPATIENT
Start: 2018-03-14 | End: 2019-01-01

## 2018-03-14 NOTE — LETTER
3/14/2018      Brian Perez MD  [address] [facility] [address]      RE: Evin FLORES Talia       Dear Colleague,    I had the pleasure of seeing Evin Jonesalycia in the [facility].    Service Date: 03/14/2018      HISTORY OF PRESENT ILLNESS:  The patient is an 83-year-old slender white male with a history of coronary artery disease dating back to 2007.  He had a Cardiolite stress test at that time which was equivocal with regard to emergent ischemia or infarct.  In 04/2009 he had chest discomfort and was sent to the [facility] for myocardial infarction.  Cardiac catheterization showed multivessel disease involving LAD without left main involvement, status post PTCA and drug-eluting stent with a Cypher drug-eluting stent.  He had a stent placed in the proximal LAD as well as PTCA of the obtuse marginal branch of the circumflex.  He had a history of hypertension requiring multiple drugs.  He had stopped his Plavix prematurely in the past with recurrent chest discomfort, was taken to the Cardiac Catheterization Laboratory with diffuse pain symptoms but no evidence of thrombosis or dissection.  He presents to Cardiology Clinic at this time for followup of his ischemic heart disease as well as labile hypertension.  He has not had jayesh symptoms of persistent chest discomfort, shortness of breath, palpitations, nausea, vomiting, diaphoresis or syncope.  He has occasional lightheadedness if he moves too quickly but no significant dizziness.  He denies PND, orthopnea, fevers, chills or sweats.  He has also had isolated episodes of gastroesophageal reflux, easy fatigability, general malaise, mild dyspnea on exertion.  He had a Cardiolite stress test in 2013 that showed no evidence of ischemia or infarct.  Echocardiography last year demonstrated normal-sized left ventricle with intact systolic function with ejection fraction of 60%-65% without  valvular dysfunction.  The patient has had cognitive decline over the past year with increasing signs of early dementia.      MEDICATIONS:   1.  Amlodipine 5 mg a day.   2.  Ranitidine 150 mg twice a day.   3.  Guaifenesin 400 mg every 6 hours as needed.   4.  Albuterol inhaler as directed   5.  Flonase inhaler as directed.   6.  Atorvastatin 20 mg a day   7.  Clopidogrel 75 mg a day.   8.  Lisinopril 2.5 mg a day.   9.  Metoprolol XL 25 mg a day.   10.  Nitroglycerin 0.4 mg sublingual p.r.n.      LABORATORY DATA:  Demonstrated cholesterol 138, HDL 64, LDL 57, triglyceride 86, sodium 139, potassium 3.7, BUN 18, creatinine 1.26 and ALT 19.        The patient does not appear to be greatly restricted at this time.  He denies PND, orthopnea, fevers, chills or sweats.  He does not have good sleep and at times will feel fatigued.      PHYSICAL EXAMINATION:   VITAL SIGNS:  Blood pressure 136/79 with a heart rate of 69 and regular.  Weight was 111 pounds, which is stable.   NECK:  Without jugular venous distention, carotid bruit or palpable thyroid.   CHEST:  Essentially clear to percussion and auscultation with slight decreased breath sounds at the bases.   CARDIAC:  Regular rhythm, soft S4 gallop, 1/6 systolic murmur at the left sternal border with minimal radiation.  No diastolic murmur, rub or S3.   EXTREMITIES:  Without cyanosis or edema.      CLINICAL IMPRESSION:   1.  Stable cardiac condition.   2.  Ischemic heart disease, status post PTCA and stent of the left anterior descending as well as PTCA of the obtuse marginal branch of the circumflex coronary in 2009.   3.  Hyperlipidemia, at goal.   4.  Asthma treated with inhaler.   5.  Anxiety, depression and decreased cognitive ability.   6.  Borderline systolic hypertension.   7.  Gastroesophageal reflux disease.   8.  Benign prostatic hypertrophy.      DISCUSSION:  The patient has done reasonably well from a cardiac viewpoint.  He has not had significant symptoms of  angina pectoris or congestive heart failure.  He still has some lability of his blood pressure.  He is also not fond of his multiple medications and has had cognitive deterioration.  Overall, he will remain unchanged.  He has elected to be DNR/DNI and would like conservative management.  He will try to emphasize nutrition and sleep as well as maintaining his activity.      RECOMMENDATIONS:   1.  Continue present medications.   2.  Close followup of serum lipids, basic metabolic panel and blood pressure.   3.  Diet and exercise program as tolerated.   4.  Aggressive gastroesophageal reflux therapy.   5.  Urologic followup.   6.  Routine medical followup.   7.  Cardiology followup in 6 months.      cc:   Brian Perez MD    [facility]    [address] [place]  [address]         CORWIN MARCOS MD, [facility]             D: 2018   T: 2018   MT: SAMANTHA      Name:     CONRADO WALDEN   MRN:      [id number]        Account:      [id number]   :      1934           Service Date: 2018      Document: [id number]       Outpatient Encounter Prescriptions as of 3/14/2018   Medication Sig Dispense Refill     amLODIPine (NORVASC) 5 MG tablet Take 1 tablet (5 mg) by mouth daily 90 tablet 3     ranitidine (ZANTAC) 150 MG tablet Take 1 tablet (150 mg) by mouth 2 times daily as needed for heartburn 90 tablet 2     guaiFENesin (ORGANIDIN) 200 MG TABS tablet Take 2 tablets (400 mg) by mouth every 6 hours as needed for cough 60 tablet 0     albuterol (PROAIR HFA) 108 (90 BASE) MCG/ACT Inhaler Inhale 2 puffs into the lungs every 4 hours as needed for shortness of breath / dyspnea Rinse mouthpiece weekly. 1 Inhaler 11     fluticasone (FLONASE) 50 MCG/ACT spray Spray 1-2 sprays into both nostrils daily 16 g 0     atorvastatin (LIPITOR) 20 MG tablet Take 1 tablet (20 mg) by mouth daily 90 tablet 3     clopidogrel (PLAVIX) 75 MG tablet Take 1 tablet (75 mg) by mouth daily 90 tablet 3     lisinopril  (PRINIVIL/ZESTRIL) 2.5 MG tablet Take 1 tablet (2.5 mg) by mouth daily 90 tablet 3     metoprolol (TOPROL-XL) 25 MG 24 hr tablet Take 1 tablet (25 mg) by mouth daily 90 tablet 3     nitroglycerin (NITROSTAT) 0.4 MG SL tablet Place 1 tablet (0.4 mg) under the tongue every 5 minutes as needed for chest pain for chest pain 25 tablet 3     [DISCONTINUED] amLODIPine (NORVASC) 5 MG tablet Take 1 tablet (5 mg) by mouth daily 90 tablet 3     No facility-administered encounter medications on file as of 3/14/2018.        Again, thank you for allowing me to participate in the care of your patient.      Sincerely,    Say Fisher MD     [facility]

## 2018-03-14 NOTE — MR AVS SNAPSHOT
After Visit Summary   3/14/2018    Evin Melgar    MRN: [id number]           Patient Information     Date Of Birth          6/1/1934        Visit Information        Provider Department      3/14/2018 3:00 PM Say Fisher MD [facility]        Today's Diagnoses     Essential hypertension with goal blood pressure less than 140/90    -  1    Coronary artery disease involving native coronary artery of native heart without angina pectoris        Hyperlipidemia LDL goal <100        Syncope and collapse          Care Instructions    [facility]~[address]~[place], [place]~[address]  Thank you for your  Heart Care visit today. If you have questions regarding your visit, please contact your cardiology RN's, Staci Cazares or Shilpi Acevedo, at [phone]. Your provider has recommended the following:  Medication Changes:  None today. Continue taking your medications as you have been.  Recommendations:  1. Non fasting blood work to be done in 3 months just prior to your follow up with Anitra Montaño PA-C  2. Fasting blood work to be done in 6 months just prior to your follow up with your new cardiologist Dr Toledo  Follow-up:  1. See Anitra Montaño PA-C for cardiology follow up in 3 months  2. See Dr Toledo for cardiology follow up in 6 months  To schedule a future appointment, we kindly ask that you call cardiology scheduling at [phone] three months prior to requested revisit date.               Reminder:  For your safety, we ask that you bring in your current medication(s) or an updated list of your medications with you to EACH office visit. Include the medication name, dose of pill on bottle and how you are taking it. Include over-the-counter medications or supplements. Your provider will review this at each visit and plan your care based on your current information.                Follow-ups after your visit        Additional Services     Follow-Up with Cardiac Advanced Practice Provider           Follow-Up with Cardiologist                 Future tests that were ordered for you today     Open Future Orders        Priority Expected Expires Ordered    Basic metabolic panel Routine 6/14/2018 3/14/2019 3/14/2018    Lipid panel reflex to direct LDL Fasting Routine 9/14/2018 3/14/2019 3/14/2018    ALT Routine 9/14/2018 3/14/2019 3/14/2018    Basic metabolic panel Routine 9/14/2018 3/14/2019 3/14/2018    Follow-Up with Cardiac Advanced Practice Provider Routine 6/14/2018 3/13/2020 3/14/2018    Follow-Up with Cardiologist Routine 9/14/2018 3/13/2020 3/14/2018            Who to contact     If you have questions or need follow up information about today's clinic visit or your schedule please contact [facility] directly at [phone].  Normal or non-critical lab and imaging results will be communicated to you by Ribbonhart, letter or phone within 4 business days after the clinic has received the results. If you do not hear from us within 7 days, please contact the clinic through [employer] or phone. If you have a critical or abnormal lab result, we will notify you by phone as soon as possible.  Submit refill requests through Cinecore or call your pharmacy and they will forward the refill request to us. Please allow 3 business days for your refill to be completed.          Additional Information About Your Visit        Ribbonhart Information     Cinecore gives you secure access to your electronic health record. If you see a primary care provider, you can also send messages to your care team and make appointments. If you have questions, please call your primary care clinic.  If you do not have a primary care provider, please call [phone] and they will assist you.        Care EveryWhere ID     This is your Care EveryWhere ID. This could be used by other organizations  to access your [place] medical records  [id number]        Your Vitals Were     Pulse Pulse Oximetry BMI (Body Mass Index)             69 95% 19.35 kg/m2          Blood Pressure from Last 3 Encounters:   03/14/18 136/79   01/16/18 135/78   12/05/17 131/64    Weight from Last 3 Encounters:   03/14/18 50.3 kg (111 lb)   01/16/18 51.1 kg (112 lb 9.6 oz)   12/05/17 49.7 kg (109 lb 9.6 oz)              We Performed the Following     Follow-Up with Cardiologist        Primary Care Provider Office Phone # Fax #    Brian Perez [phone] [address] [facility] [address]        Equal Access to Services     LEANDER MENJIVAR : Laura fay Sosravani, waaxda luqadaha, qaybta kaalmada adeegyada, westley ramey . So [facility] [phone].    ATENCIÓN: Si habla español, tiene a vargas disposición servicios gratuitos de asistencia lingüística. Llame al [phone].    We comply with applicable federal civil rights laws and [place] laws. We do not discriminate on the basis of race, color, national origin, age, disability, sex, sexual orientation, or gender identity.            Thank you!     Thank you for choosing [facility]  for your care. Our goal is always to provide you with excellent care. Hearing back from our patients is one way we can continue to improve our services. Please take a few minutes to complete the written survey that you may receive in the mail after your visit with us. Thank you!             Your Updated Medication List - Protect others around you: Learn how to safely use, store and throw away your medicines at www.disposemymeds.org.          This list is accurate as of 3/14/18  3:37 PM.  Always use your most recent med list.                   Brand Name Dispense Instructions for use Diagnosis    albuterol 108 (90 BASE) MCG/ACT Inhaler    PROAIR HFA    1 Inhaler    Inhale 2 puffs into the  lungs every 4 hours as needed for shortness of breath / dyspnea Rinse mouthpiece weekly.    Moderate persistent asthma without complication       amLODIPine 5 MG tablet    NORVASC    90 tablet    Take 1 tablet (5 mg) by mouth daily    Essential hypertension with goal blood pressure less than 140/90       atorvastatin 20 MG tablet    LIPITOR    90 tablet    Take 1 tablet (20 mg) by mouth daily    Hyperlipidemia LDL goal <100       clopidogrel 75 MG tablet    PLAVIX    90 tablet    Take 1 tablet (75 mg) by mouth daily    Coronary artery disease involving native coronary artery, angina presence unspecified, unspecified whether native or transplanted heart       fluticasone 50 MCG/ACT spray    FLONASE    16 g    Spray 1-2 sprays into both nostrils daily    Postnasal drip       guaiFENesin 200 MG Tabs tablet    ORGANIDIN    60 tablet    Take 2 tablets (400 mg) by mouth every 6 hours as needed for cough    Viral URI with cough       lisinopril 2.5 MG tablet    PRINIVIL/Zestril    90 tablet    Take 1 tablet (2.5 mg) by mouth daily    Essential hypertension with goal blood pressure less than 140/90       metoprolol succinate 25 MG 24 hr tablet    TOPROL-XL    90 tablet    Take 1 tablet (25 mg) by mouth daily    Syncope and collapse       nitroGLYcerin 0.4 MG sublingual tablet    NITROSTAT    25 tablet    Place 1 tablet (0.4 mg) under the tongue every 5 minutes as needed for chest pain for chest pain    CAD (coronary artery disease)       ranitidine 150 MG tablet    ZANTAC    90 tablet    Take 1 tablet (150 mg) by mouth 2 times daily as needed for heartburn    Gastroesophageal reflux disease without esophagitis

## 2018-03-14 NOTE — LETTER
3/14/2018    Brian Perez MD  [address] [facility] [address]    RE: Evin FLORES Talia       Dear Colleague,    I had the pleasure of seeing Evin Jonesalycia in the [facility].    Service Date: 03/14/2018      HISTORY OF PRESENT ILLNESS:  The patient is an 83-year-old slender white male with a history of coronary artery disease dating back to 2007.  He had a Cardiolite stress test at that time which was equivocal with regard to emergent ischemia or infarct.  In 04/2009 he had chest discomfort and was sent to the [facility] for myocardial infarction.  Cardiac catheterization showed multivessel disease involving LAD without left main involvement, status post PTCA and drug-eluting stent with a Cypher drug-eluting stent.  He had a stent placed in the proximal LAD as well as PTCA of the obtuse marginal branch of the circumflex.  He had a history of hypertension requiring multiple drugs.  He had stopped his Plavix prematurely in the past with recurrent chest discomfort, was taken to the Cardiac Catheterization Laboratory with diffuse pain symptoms but no evidence of thrombosis or dissection.  He presents to Cardiology Clinic at this time for followup of his ischemic heart disease as well as labile hypertension.  He has not had jayesh symptoms of persistent chest discomfort, shortness of breath, palpitations, nausea, vomiting, diaphoresis or syncope.  He has occasional lightheadedness if he moves too quickly but no significant dizziness.  He denies PND, orthopnea, fevers, chills or sweats.  He has also had isolated episodes of gastroesophageal reflux, easy fatigability, general malaise, mild dyspnea on exertion.  He had a Cardiolite stress test in 2013 that showed no evidence of ischemia or infarct.  Echocardiography last year demonstrated normal-sized left ventricle with intact systolic function with ejection fraction of 60%-65% without  valvular dysfunction.  The patient has had cognitive decline over the past year with increasing signs of early dementia.      MEDICATIONS:   1.  Amlodipine 5 mg a day.   2.  Ranitidine 150 mg twice a day.   3.  Guaifenesin 400 mg every 6 hours as needed.   4.  Albuterol inhaler as directed   5.  Flonase inhaler as directed.   6.  Atorvastatin 20 mg a day   7.  Clopidogrel 75 mg a day.   8.  Lisinopril 2.5 mg a day.   9.  Metoprolol XL 25 mg a day.   10.  Nitroglycerin 0.4 mg sublingual p.r.n.      LABORATORY DATA:  Demonstrated cholesterol 138, HDL 64, LDL 57, triglyceride 86, sodium 139, potassium 3.7, BUN 18, creatinine 1.26 and ALT 19.        The patient does not appear to be greatly restricted at this time.  He denies PND, orthopnea, fevers, chills or sweats.  He does not have good sleep and at times will feel fatigued.      PHYSICAL EXAMINATION:   VITAL SIGNS:  Blood pressure 136/79 with a heart rate of 69 and regular.  Weight was 111 pounds, which is stable.   NECK:  Without jugular venous distention, carotid bruit or palpable thyroid.   CHEST:  Essentially clear to percussion and auscultation with slight decreased breath sounds at the bases.   CARDIAC:  Regular rhythm, soft S4 gallop, 1/6 systolic murmur at the left sternal border with minimal radiation.  No diastolic murmur, rub or S3.   EXTREMITIES:  Without cyanosis or edema.      CLINICAL IMPRESSION:   1.  Stable cardiac condition.   2.  Ischemic heart disease, status post PTCA and stent of the left anterior descending as well as PTCA of the obtuse marginal branch of the circumflex coronary in 2009.   3.  Hyperlipidemia, at goal.   4.  Asthma treated with inhaler.   5.  Anxiety, depression and decreased cognitive ability.   6.  Borderline systolic hypertension.   7.  Gastroesophageal reflux disease.   8.  Benign prostatic hypertrophy.      DISCUSSION:  The patient has done reasonably well from a cardiac viewpoint.  He has not had significant symptoms of  angina pectoris or congestive heart failure.  He still has some lability of his blood pressure.  He is also not fond of his multiple medications and has had cognitive deterioration.  Overall, he will remain unchanged.  He has elected to be DNR/DNI and would like conservative management.  He will try to emphasize nutrition and sleep as well as maintaining his activity.      RECOMMENDATIONS:   1.  Continue present medications.   2.  Close followup of serum lipids, basic metabolic panel and blood pressure.   3.  Diet and exercise program as tolerated.   4.  Aggressive gastroesophageal reflux therapy.   5.  Urologic followup.   6.  Routine medical followup.   7.  Cardiology followup in 6 months.      cc:   Brian Perez MD    [facility]    [address] [place]  [address]         CORWIN MARCOS MD, [facility]             D: 2018   T: 2018   MT: SAMANTHA      Name:     CONRADO WALDEN   MRN:      [id number]        Account:      [id number]   :      1934           Service Date: 2018      Document: [id number]       Thank you for allowing me to participate in the care of your patient.      Sincerely,     Corwin Marcos MD     [facility]    cc:   Corwin Marcos MD  [address]  [place] [address]

## 2018-03-14 NOTE — PATIENT INSTRUCTIONS
[facility]  [facility]~[address]~[place], [place]~[address]  Thank you for your [facility] Heart Care visit today. If you have questions regarding your visit, please contact your cardiology RN's, Staci Cazares or Shilpi Acevedo, at [phone]. Your provider has recommended the following:  Medication Changes:  None today. Continue taking your medications as you have been.  Recommendations:  1. Non fasting blood work to be done in 3 months just prior to your follow up with Anitra Montaño PA-C  2. Fasting blood work to be done in 6 months just prior to your follow up with your new cardiologist Dr Toledo  Follow-up:  1. See Anitra Montaño PA-C for cardiology follow up in 3 months  2. See Dr Toledo for cardiology follow up in 6 months  To schedule a future appointment, we kindly ask that you call cardiology scheduling at [phone] three months prior to requested revisit date.               Reminder:  For your safety, we ask that you bring in your current medication(s) or an updated list of your medications with you to EACH office visit. Include the medication name, dose of pill on bottle and how you are taking it. Include over-the-counter medications or supplements. Your provider will review this at each visit and plan your care based on your current information.

## 2018-03-15 NOTE — PROGRESS NOTES
Service Date: 03/14/2018      HISTORY OF PRESENT ILLNESS:  The patient is an 83-year-old slender white male with a history of coronary artery disease dating back to 2007.  He had a Cardiolite stress test at that time which was equivocal with regard to emergent ischemia or infarct.  In 04/2009 he had chest discomfort and was sent to the [facility] for myocardial infarction.  Cardiac catheterization showed multivessel disease involving LAD without left main involvement, status post PTCA and drug-eluting stent with a Cypher drug-eluting stent.  He had a stent placed in the proximal LAD as well as PTCA of the obtuse marginal branch of the circumflex.  He had a history of hypertension requiring multiple drugs.  He had stopped his Plavix prematurely in the past with recurrent chest discomfort, was taken to the Cardiac Catheterization Laboratory with diffuse pain symptoms but no evidence of thrombosis or dissection.  He presents to Cardiology Clinic at this time for followup of his ischemic heart disease as well as labile hypertension.  He has not had jayesh symptoms of persistent chest discomfort, shortness of breath, palpitations, nausea, vomiting, diaphoresis or syncope.  He has occasional lightheadedness if he moves too quickly but no significant dizziness.  He denies PND, orthopnea, fevers, chills or sweats.  He has also had isolated episodes of gastroesophageal reflux, easy fatigability, general malaise, mild dyspnea on exertion.  He had a Cardiolite stress test in 2013 that showed no evidence of ischemia or infarct.  Echocardiography last year demonstrated normal-sized left ventricle with intact systolic function with ejection fraction of 60%-65% without valvular dysfunction.  The patient has had cognitive decline over the past year with increasing signs of early dementia.      MEDICATIONS:   1.  Amlodipine 5 mg a day.   2.  Ranitidine 150 mg twice a day.   3.  Guaifenesin 400 mg every 6  hours as needed.   4.  Albuterol inhaler as directed   5.  Flonase inhaler as directed.   6.  Atorvastatin 20 mg a day   7.  Clopidogrel 75 mg a day.   8.  Lisinopril 2.5 mg a day.   9.  Metoprolol XL 25 mg a day.   10.  Nitroglycerin 0.4 mg sublingual p.r.n.      LABORATORY DATA:  Demonstrated cholesterol 138, HDL 64, LDL 57, triglyceride 86, sodium 139, potassium 3.7, BUN 18, creatinine 1.26 and ALT 19.        The patient does not appear to be greatly restricted at this time.  He denies PND, orthopnea, fevers, chills or sweats.  He does not have good sleep and at times will feel fatigued.      PHYSICAL EXAMINATION:   VITAL SIGNS:  Blood pressure 136/79 with a heart rate of 69 and regular.  Weight was 111 pounds, which is stable.   NECK:  Without jugular venous distention, carotid bruit or palpable thyroid.   CHEST:  Essentially clear to percussion and auscultation with slight decreased breath sounds at the bases.   CARDIAC:  Regular rhythm, soft S4 gallop, 1/6 systolic murmur at the left sternal border with minimal radiation.  No diastolic murmur, rub or S3.   EXTREMITIES:  Without cyanosis or edema.      CLINICAL IMPRESSION:   1.  Stable cardiac condition.   2.  Ischemic heart disease, status post PTCA and stent of the left anterior descending as well as PTCA of the obtuse marginal branch of the circumflex coronary in 2009.   3.  Hyperlipidemia, at goal.   4.  Asthma treated with inhaler.   5.  Anxiety, depression and decreased cognitive ability.   6.  Borderline systolic hypertension.   7.  Gastroesophageal reflux disease.   8.  Benign prostatic hypertrophy.      DISCUSSION:  The patient has done reasonably well from a cardiac viewpoint.  He has not had significant symptoms of angina pectoris or congestive heart failure.  He still has some lability of his blood pressure.  He is also not fond of his multiple medications and has had cognitive deterioration.  Overall, he will remain unchanged.  He has elected to be  DNR/DNI and would like conservative management.  He will try to emphasize nutrition and sleep as well as maintaining his activity.      RECOMMENDATIONS:   1.  Continue present medications.   2.  Close followup of serum lipids, basic metabolic panel and blood pressure.   3.  Diet and exercise program as tolerated.   4.  Aggressive gastroesophageal reflux therapy.   5.  Urologic followup.   6.  Routine medical followup.   7.  Cardiology followup in 6 months.      cc:   Brian Perez MD    [facility]    [address] [place]  [address]         CORWIN MARCOS MD, FACC             D: 2018   T: 2018   MT: SAMANTHA      Name:     CONRADO WALDEN   MRN:      [id number]        Account:      [id number]   :      1934           Service Date: 2018      Document: [id number]

## 2018-03-16 ENCOUNTER — MYC MEDICAL ADVICE (OUTPATIENT)
Dept: FAMILY MEDICINE | Facility: CLINIC | Age: 83
End: 2018-03-16

## 2018-03-29 DIAGNOSIS — R55 SYNCOPE AND COLLAPSE: ICD-10-CM

## 2018-03-29 DIAGNOSIS — I10 ESSENTIAL HYPERTENSION WITH GOAL BLOOD PRESSURE LESS THAN 140/90: ICD-10-CM

## 2018-03-29 DIAGNOSIS — E78.5 HYPERLIPIDEMIA LDL GOAL <100: ICD-10-CM

## 2018-03-29 RX ORDER — METOPROLOL SUCCINATE 25 MG/1
25 TABLET, EXTENDED RELEASE ORAL DAILY
Qty: 90 TABLET | Refills: 3 | Status: SHIPPED | OUTPATIENT
Start: 2018-03-29 | End: 2019-01-01

## 2018-03-29 RX ORDER — LISINOPRIL 2.5 MG/1
2.5 TABLET ORAL DAILY
Qty: 90 TABLET | Refills: 3 | Status: SHIPPED | OUTPATIENT
Start: 2018-03-29 | End: 2019-01-01

## 2018-03-29 RX ORDER — ATORVASTATIN CALCIUM 20 MG/1
20 TABLET, FILM COATED ORAL DAILY
Qty: 90 TABLET | Refills: 3 | Status: SHIPPED | OUTPATIENT
Start: 2018-03-29 | End: 2019-01-01

## 2018-05-31 ENCOUNTER — OFFICE VISIT (OUTPATIENT)
Dept: NEUROLOGY | Facility: CLINIC | Age: 83
End: 2018-05-31
Payer: COMMERCIAL

## 2018-05-31 VITALS
SYSTOLIC BLOOD PRESSURE: 139 MMHG | BODY MASS INDEX: 19.15 KG/M2 | RESPIRATION RATE: 20 BRPM | HEART RATE: 58 BPM | TEMPERATURE: 97.6 F | WEIGHT: 109.8 LBS | DIASTOLIC BLOOD PRESSURE: 67 MMHG | OXYGEN SATURATION: 100 %

## 2018-05-31 DIAGNOSIS — F02.818 ALZHEIMER'S TYPE DEMENTIA WITH LATE ONSET WITH BEHAVIORAL DISTURBANCE (H): Primary | ICD-10-CM

## 2018-05-31 DIAGNOSIS — Z86.73 HISTORY OF STROKE: ICD-10-CM

## 2018-05-31 DIAGNOSIS — G30.1 ALZHEIMER'S TYPE DEMENTIA WITH LATE ONSET WITH BEHAVIORAL DISTURBANCE (H): Primary | ICD-10-CM

## 2018-05-31 PROCEDURE — 99215 OFFICE O/P EST HI 40 MIN: CPT | Performed by: PSYCHIATRY & NEUROLOGY

## 2018-05-31 NOTE — PATIENT INSTRUCTIONS
Plan:    Talk as a family about plans for care at home. I will ask Josefina () to give you a call about this as well.   I will talk to Dr. Perez about a mild anti-anxiety/anti-psychotic option for Evin, but in the meantime, try the melatonin, as we discussed.   Return to clinic in 3 months.

## 2018-05-31 NOTE — Clinical Note
Patient's daughter, asking if you can touch base. We had a long talk about respite / home health needs today

## 2018-05-31 NOTE — PROGRESS NOTES
A small-as-possible dose for him probably will be okay for his anxiety-like and nocturnal symptoms.  Family should be cautioned to watch for daytime drowsiness and fall risks.

## 2018-05-31 NOTE — MR AVS SNAPSHOT
After Visit Summary   5/31/2018    Evin Melgar    MRN: [id number]           Patient Information     Date Of Birth          6/1/1934        Visit Information        Provider Department      5/31/2018 2:15 PM Katherine Lopez MD [facility]        Care Instructions    Plan:    Talk as a family about plans for care at home. I will ask Josefina () to give you a call about this as well.   I will talk to Dr. Perez about a mild anti-anxiety/anti-psychotic option for Evin, but in the meantime, try the melatonin, as we discussed.   Return to clinic in 3 months.           Follow-ups after your visit        Follow-up notes from your care team     Return in about 3 months (around 8/31/2018).      Your next 10 appointments already scheduled     Jun 11, 2018 10:00 AM CDT   LAB with [facility] ([facility])    [address] [address]   [phone]           Please do not eat 10-12 hours before your appointment if you are coming in fasting for labs on lipids, cholesterol, or glucose (sugar). This does not apply to pregnant women. Water, hot tea and black coffee (with nothing added) are okay. Do not drink other fluids, diet soda or chew gum.            Jun 12, 2018  3:00 PM CDT   Return Visit with Anitra Montaño PA-C   [facility] ([facility])    [address] [address]   [phone]              Who to contact     If you have questions or need follow up information about today's clinic visit or your schedule please contact [facility] directly at [phone].  Normal or non-critical lab and imaging results will be communicated to you by MyChart, letter or phone within 4 business days after the clinic has received the results. If you do not hear from us within 7 days, please contact the clinic through MyChart or phone. If you have a critical  or abnormal lab result, we will notify you by phone as soon as possible.  Submit refill requests through [facility] [facility] or call your pharmacy and they will forward the refill request to us. Please allow 3 business days for your refill to be completed.          Additional Information About Your Visit        [facility] Information     [facility] [facility] gives you secure access to your electronic health record. If you see a primary care provider, you can also send messages to your care team and make appointments. If you have questions, please call your primary care clinic.  If you do not have a primary care provider, please call [phone] and they will assist you.        Care EveryWhere ID     This is your Care EveryWhere ID. This could be used by other organizations to access your [facility] medical records  [id number]        Your Vitals Were     Pulse Temperature Respirations Pulse Oximetry BMI (Body Mass Index)       58 97.6  F (36.4  C) (Tympanic) 20 100% 19.15 kg/m2        Blood Pressure from Last 3 Encounters:   05/31/18 148/70   03/14/18 136/79   01/16/18 135/78    Weight from Last 3 Encounters:   05/31/18 49.8 kg (109 lb 12.8 oz)   03/14/18 50.3 kg (111 lb)   01/16/18 51.1 kg (112 lb 9.6 oz)              Today, you had the following     No orders found for display       Primary Care Provider Office Phone # Fax #    Brian Mehdi Perez [phone] [address] [address]        Equal Access to Services     LEANDER MENJIVAR : Hadii aby prathero Sosravani, waaxda luqadaha, qaybta kaalmada juan, westley ramey . So [facility] [phone].    ATENCIÓN: Si habla kimberleyañol, tiene a vargas disposición servicios gratuitos de asistencia lingüística. Llame al [phone].    We comply with applicable federal civil rights laws and [place] laws. We do not discriminate on the basis of race, color, national origin, age, disability, sex, sexual orientation, or gender  identity.            Thank you!     Thank you for choosing [facility]  for your care. Our goal is always to provide you with excellent care. Hearing back from our patients is one way we can continue to improve our services. Please take a few minutes to complete the written survey that you may receive in the mail after your visit with us. Thank you!             Your Updated Medication List - Protect others around you: Learn how to safely use, store and throw away your medicines at www.disposemymeds.org.          This list is accurate as of 5/31/18  3:03 PM.  Always use your most recent med list.                   Brand Name Dispense Instructions for use Diagnosis    albuterol 108 (90 Base) MCG/ACT Inhaler    PROAIR HFA    1 Inhaler    Inhale 2 puffs into the lungs every 4 hours as needed for shortness of breath / dyspnea Rinse mouthpiece weekly.    Moderate persistent asthma without complication       amLODIPine 5 MG tablet    NORVASC    90 tablet    Take 1 tablet (5 mg) by mouth daily    Essential hypertension with goal blood pressure less than 140/90       atorvastatin 20 MG tablet    LIPITOR    90 tablet    Take 1 tablet (20 mg) by mouth daily    Hyperlipidemia LDL goal <100       clopidogrel 75 MG tablet    PLAVIX    90 tablet    Take 1 tablet (75 mg) by mouth daily    Coronary artery disease involving native coronary artery, angina presence unspecified, unspecified whether native or transplanted heart       fluticasone 50 MCG/ACT spray    FLONASE    16 g    Spray 1-2 sprays into both nostrils daily    Postnasal drip       guaiFENesin 200 MG Tabs tablet    ORGANIDIN    60 tablet    Take 2 tablets (400 mg) by mouth every 6 hours as needed for cough    Viral URI with cough       lisinopril 2.5 MG tablet    PRINIVIL/Zestril    90 tablet    Take 1 tablet (2.5 mg) by mouth daily    Essential hypertension with goal blood pressure less than 140/90       metoprolol succinate 25 MG 24 hr tablet    TOPROL-XL     90 tablet    Take 1 tablet (25 mg) by mouth daily    Syncope and collapse       nitroGLYcerin 0.4 MG sublingual tablet    NITROSTAT    25 tablet    Place 1 tablet (0.4 mg) under the tongue every 5 minutes as needed for chest pain for chest pain    CAD (coronary artery disease)       ranitidine 150 MG tablet    ZANTAC    90 tablet    Take 1 tablet (150 mg) by mouth 2 times daily as needed for heartburn    Gastroesophageal reflux disease without esophagitis

## 2018-05-31 NOTE — PROGRESS NOTES
ESTABLISHED PATIENT NEUROLOGY NOTE    DATE OF VISIT: 5/31/2018  MRN: [id number]  PATIENT NAME: Evin Melgar  YOB: 1934    Chief Complaint   Patient presents with     Memory Loss     recheck     SUBJECTIVE:                                                      HISTORY OF PRESENT ILLNESS:  Evin is here for follow up regarding dementia. He has likely Alzheimer's disease. He also has a history of stroke. Since I met with the patient in 9.2017, his family has been in communication with our care coordinators, working on future planning and paperwork. I had suggested getting the help of our mental health colleagues and some physical therapy in the past, but Evin has refused and per family today, continues to.    I received this message 2 days ago:  Dr. Lopez,  My dad has an appointment this Thursday.  I just thought I would give you an update before our visit.  His confusion has been worse these days.  He repeats himself often, like a 2 minute reset at times.  The worst thing is that he is not sleeping at night.  This has become a problem recently.  [place] cabin and kept trying to find the bathroom and opening every door, even the front door several times.  My mom is not getting enough sleep with these disruptions, she will try to nap during the day when he does better.  Is there any sleep med we can get for him?  Or is this like the anxiety thing where we have to see Dr. Perez?  He is isolating himself as he knows he does not make sense a lot of the time so he does not like to be around others anymore.  He has told me that he does not want to be around strangers.  He notices his confusion and will say that he is really confused.  I do not think we are at the nursing home level by any means but it does seem to be   progressing more with each 6 month visit we have.     See you ThursdaySruthi      The patient, when asked what has been going on since we last met, mentions some calf pain.  When asked directly, he says his memory is bad.     He is here wife his wife, Gema, daughter, Sruthi, and son, Kel. They agree there has been progression.     The patient is still living with his wife. She says this is not going well (see note above).  Nights and mornings are the most difficult. He has been sleeping better the last few nights, per wife though. They have discussed trying some melatonin for sleep and ask if this would be ok. I guess they did talk to Dr. Perez too about something for Evin's anxiety/frustration, but per Sruthi, it was decided against due to potential side effects. Sruthi works in long-term care and does not think her father needs to be in a memory care unit yet, but she agrees with Gema that they need help at home. They cannot say why they have not really moved forward in this regard, but they have discussed it.     Gema says Evin perseverates. He will talk about going to the clinic the whole day if he has that in is head, for instance.     No recent falls.    CURRENT MEDICATIONS:     Current Outpatient Prescriptions on File Prior to Visit:  albuterol (PROAIR HFA) 108 (90 BASE) MCG/ACT Inhaler Inhale 2 puffs into the lungs every 4 hours as needed for shortness of breath / dyspnea Rinse mouthpiece weekly.   amLODIPine (NORVASC) 5 MG tablet Take 1 tablet (5 mg) by mouth daily   atorvastatin (LIPITOR) 20 MG tablet Take 1 tablet (20 mg) by mouth daily   clopidogrel (PLAVIX) 75 MG tablet Take 1 tablet (75 mg) by mouth daily   fluticasone (FLONASE) 50 MCG/ACT spray Spray 1-2 sprays into both nostrils daily   lisinopril (PRINIVIL/ZESTRIL) 2.5 MG tablet Take 1 tablet (2.5 mg) by mouth daily   metoprolol succinate (TOPROL-XL) 25 MG 24 hr tablet Take 1 tablet (25 mg) by mouth daily   nitroglycerin (NITROSTAT) 0.4 MG SL tablet Place 1 tablet (0.4 mg) under the tongue every 5 minutes as needed for chest pain for chest pain   ranitidine (ZANTAC) 150 MG tablet Take 1 tablet (150 mg) by  mouth 2 times daily as needed for heartburn   guaiFENesin (ORGANIDIN) 200 MG TABS tablet Take 2 tablets (400 mg) by mouth every 6 hours as needed for cough (Patient not taking: Reported on 2018)     No current facility-administered medications on file prior to visit.     RECENT DIAGNOSTIC STUDIES:   Labs:   Results for orders placed or performed in visit on 18   Basic metabolic panel   Result Value Ref Range    Sodium 139 133 - 144 mmol/L    Potassium 3.7 3.4 - 5.3 mmol/L    Chloride 105 94 - 109 mmol/L    Carbon Dioxide 27 20 - 32 mmol/L    Anion Gap 7 3 - 14 mmol/L    Glucose 106 (H) 70 - 99 mg/dL    Urea Nitrogen 18 7 - 30 mg/dL    Creatinine 1.26 (H) 0.66 - 1.25 mg/dL    GFR Estimate 55 (L) >60 mL/min/1.7m2    GFR Estimate If Black 66 >60 mL/min/1.7m2    Calcium 8.7 8.5 - 10.1 mg/dL   Lipid Profile   Result Value Ref Range    Cholesterol 138 <200 mg/dL    Triglycerides 86 <150 mg/dL    HDL Cholesterol 64 >39 mg/dL    LDL Cholesterol Calculated 57 <100 mg/dL    Non HDL Cholesterol 74 <130 mg/dL   ALT   Result Value Ref Range    ALT 19 0 - 70 U/L     REVIEW OF SYSTEMS:                                                      10-point review of systems is negative except as mentioned above in HPI.     EXAM:                                                      Physical Exam:   Vitals: /67 (BP Location: Left arm, Patient Position: Sitting, Cuff Size: Child)  Pulse 58  Temp 97.6  F (36.4  C) (Tympanic)  Resp 20  Wt 49.8 kg (109 lb 12.8 oz)  SpO2 100%  BMI 19.15 kg/m2  BMI= Body mass index is 19.15 kg/(m^2).  GENERAL: NAD. Thin. (weight loss between  and today: 2 lbs)  Neurologic:  MENTAL STATUS: Alert, attentive though easily distracted, perseverative. Speech is fluent. Fair comprehension with cues. Mini-Co/5. Naming of family members is accurate.   CRANIAL NERVES: Visual fields intact to confrontation. Pupils equally, round and reactive to light. Facial sensation and movement normal. EOM  full. Hearing intact to conversation. Trapezius strength intact. Palate moves symmetrically. Tongue midline.  MOTOR: 4+/5 in proximal and distal muscle groups of lower extremities. Tone and bulk normal.    COORDINATION: Slow, but normal finger nose finger. Hand opening/closing appears normal.  STATION AND GAIT: Gait is cautious.  CV: RRR. S1, S2.    NECK: No bruits.  BP improved with 2nd check.    ASSESSMENT and PLAN:                                                      Assessment and Plan:    ICD-10-CM    1. Alzheimer's type dementia with late onset with behavioral disturbance G30.1     F02.81         Mr. Melgar is a pleasant 84 yo man with dementia, likely Alzheimer's disease with progression. We spent the majority of Evin's visit discussing future planning. I am very concerned that his wife needs respite at this point. Family is not ready to seriously consider placement, but they agreed to discuss home help. I asked them to also once again involve our care coordinators. In terms of sleep, they will first try some melatonin. I suggest a low dose of Seroquel for his perseverative daytime behaviors and perhaps additionally for sleep. I will check with his primary care provider to make sure this is ok, but I do not see any contraindications from a current medication standpoint. I would like to follow Evin closely, seeing him again in a few months. Hopefully some steps will have been taking from a home care standpoint by then. I asked the family to let me know if they need anything from me in the meantime.     Patient Instructions:  Talk as a family about plans for care at home. I will ask Josefina () to give you a call about this as well.   I will talk to Dr. Perez about a mild anti-anxiety/anti-psychotic option for Evin, but in the meantime, try the melatonin, as we discussed.   Return to clinic in 3 months.     Total Time: >40 minute were spent with the patient and the family. More than 50% of the  time spent on counseling (as described above in Assessment and Plan) /coordinating the care.    Katherine Lopez MD  Neurology

## 2018-05-31 NOTE — LETTER
5/31/2018         RE: Evin Melgar  [address]  [address]  [facility] [address]        Dear Colleague,    Thank you for referring your patient, Evin Melgar, to the [facility]. Please see a copy of my visit note below.    ESTABLISHED PATIENT NEUROLOGY NOTE    DATE OF VISIT: 5/31/2018  MRN: [id number]  PATIENT NAME: Evin Melgar  YOB: 1934    Chief Complaint   Patient presents with     Memory Loss     recheck     SUBJECTIVE:                                                      HISTORY OF PRESENT ILLNESS:  Evin is here for follow up regarding dementia. He has likely Alzheimer's disease. He also has a history of stroke. Since I met with the patient in 9.2017, his family has been in communication with our care coordinators, working on future planning and paperwork. I had suggested getting the help of our mental health colleagues and some physical therapy in the past, but Evin has refused and per family today, continues to.    I received this message 2 days ago:  Dr. Lopez,  My dad has an appointment this Thursday.  I just thought I would give you an update before our visit.  His confusion has been worse these days.  He repeats himself often, like a 2 minute reset at times.  The worst thing is that he is not sleeping at night.  This has become a problem recently.  He was at our cabin and kept trying to find the bathroom and opening every door, even the front door several times.  My mom is not getting enough sleep with these disruptions, she will try to nap during the day when he does better.  Is there any sleep med we can get for him?  Or is this like the anxiety thing where we have to see Dr. Perez?  He is isolating himself as he knows he does not make sense a lot of the time so he does not like to be around others anymore.  He has told me that he does not want to be around strangers.  He notices his confusion and will say that he is really confused.  I do not  think we are at the nursing home level by any means but it does seem to be   progressing more with each 6 month visit we have.     See you Thursday,   Sruthi Brian      The patient, when asked what has been going on since we last met, mentions some calf pain. When asked directly, he says his memory is bad.     He is here wife his wife, Gema, daughter, Sruthi, and son, Kel. They agree there has been progression.     The patient is still living with his wife. She says this is not going well (see note above).  Nights and mornings are the most difficult. He has been sleeping better the last few nights, per wife though. They have discussed trying some melatonin for sleep and ask if this would be ok. I guess they did talk to Dr. Perez too about something for Evin's anxiety/frustration, but per Sruthi, it was decided against due to potential side effects. Sruthi works in long-term care and does not think her father needs to be in a memory care unit yet, but she agrees with Gema that they need help at home. They cannot say why they have not really moved forward in this regard, but they have discussed it.     Gema says Evin perseverates. He will talk about going to the clinic the whole day if he has that in is head, for instance.     No recent falls.    CURRENT MEDICATIONS:     Current Outpatient Prescriptions on File Prior to Visit:  albuterol (PROAIR HFA) 108 (90 BASE) MCG/ACT Inhaler Inhale 2 puffs into the lungs every 4 hours as needed for shortness of breath / dyspnea Rinse mouthpiece weekly.   amLODIPine (NORVASC) 5 MG tablet Take 1 tablet (5 mg) by mouth daily   atorvastatin (LIPITOR) 20 MG tablet Take 1 tablet (20 mg) by mouth daily   clopidogrel (PLAVIX) 75 MG tablet Take 1 tablet (75 mg) by mouth daily   fluticasone (FLONASE) 50 MCG/ACT spray Spray 1-2 sprays into both nostrils daily   lisinopril (PRINIVIL/ZESTRIL) 2.5 MG tablet Take 1 tablet (2.5 mg) by mouth daily   metoprolol succinate  (TOPROL-XL) 25 MG 24 hr tablet Take 1 tablet (25 mg) by mouth daily   nitroglycerin (NITROSTAT) 0.4 MG SL tablet Place 1 tablet (0.4 mg) under the tongue every 5 minutes as needed for chest pain for chest pain   ranitidine (ZANTAC) 150 MG tablet Take 1 tablet (150 mg) by mouth 2 times daily as needed for heartburn   guaiFENesin (ORGANIDIN) 200 MG TABS tablet Take 2 tablets (400 mg) by mouth every 6 hours as needed for cough (Patient not taking: Reported on 5/31/2018)     No current facility-administered medications on file prior to visit.     RECENT DIAGNOSTIC STUDIES:   Labs:   Results for orders placed or performed in visit on 03/13/18   Basic metabolic panel   Result Value Ref Range    Sodium 139 133 - 144 mmol/L    Potassium 3.7 3.4 - 5.3 mmol/L    Chloride 105 94 - 109 mmol/L    Carbon Dioxide 27 20 - 32 mmol/L    Anion Gap 7 3 - 14 mmol/L    Glucose 106 (H) 70 - 99 mg/dL    Urea Nitrogen 18 7 - 30 mg/dL    Creatinine 1.26 (H) 0.66 - 1.25 mg/dL    GFR Estimate 55 (L) >60 mL/min/1.7m2    GFR Estimate If Black 66 >60 mL/min/1.7m2    Calcium 8.7 8.5 - 10.1 mg/dL   Lipid Profile   Result Value Ref Range    Cholesterol 138 <200 mg/dL    Triglycerides 86 <150 mg/dL    HDL Cholesterol 64 >39 mg/dL    LDL Cholesterol Calculated 57 <100 mg/dL    Non HDL Cholesterol 74 <130 mg/dL   ALT   Result Value Ref Range    ALT 19 0 - 70 U/L     REVIEW OF SYSTEMS:                                                      10-point review of systems is negative except as mentioned above in HPI.     EXAM:                                                      Physical Exam:   Vitals: /67 (BP Location: Left arm, Patient Position: Sitting, Cuff Size: Child)  Pulse 58  Temp 97.6  F (36.4  C) (Tympanic)  Resp 20  Wt 49.8 kg (109 lb 12.8 oz)  SpO2 100%  BMI 19.15 kg/m2  BMI= Body mass index is 19.15 kg/(m^2).  GENERAL: NAD. Thin. (weight loss between 9.2017 and today: 2 lbs)  Neurologic:  MENTAL STATUS: Alert, attentive though easily  distracted, perseverative. Speech is fluent. Fair comprehension with cues. Mini-Co/5. Naming of family members is accurate.   CRANIAL NERVES: Visual fields intact to confrontation. Pupils equally, round and reactive to light. Facial sensation and movement normal. EOM full. Hearing intact to conversation. Trapezius strength intact. Palate moves symmetrically. Tongue midline.  MOTOR: 4+/5 in proximal and distal muscle groups of lower extremities. Tone and bulk normal.    COORDINATION: Slow, but normal finger nose finger. Hand opening/closing appears normal.  STATION AND GAIT: Gait is cautious.  CV: RRR. S1, S2.    NECK: No bruits.  BP improved with 2nd check.    ASSESSMENT and PLAN:                                                      Assessment and Plan:    ICD-10-CM    1. Alzheimer's type dementia with late onset with behavioral disturbance G30.1     F02.81         Mr. Melgar is a pleasant 84 yo man with dementia, likely Alzheimer's disease with progression. We spent the majority of Evin's visit discussing future planning. I am very concerned that his wife needs respite at this point. Family is not ready to seriously consider placement, but they agreed to discuss home help. I asked them to also once again involve our care coordinators. In terms of sleep, they will first try some melatonin. I suggest a low dose of Seroquel for his perseverative daytime behaviors and perhaps additionally for sleep. I will check with his primary care provider to make sure this is ok, but I do not see any contraindications from a current medication standpoint. I would like to follow Evin closely, seeing him again in a few months. Hopefully some steps will have been taking from a home care standpoint by then. I asked the family to let me know if they need anything from me in the meantime.     Patient Instructions:  Talk as a family about plans for care at home. I will ask Josefina () to give you a call about this as  well.   I will talk to Dr. Perez about a mild anti-anxiety/anti-psychotic option for Evin, but in the meantime, try the melatonin, as we discussed.   Return to clinic in 3 months.     Total Time: >40 minute were spent with the patient and the family. More than 50% of the time spent on counseling (as described above in Assessment and Plan) /coordinating the care.    Katherine Lopez MD  Neurology                    Again, thank you for allowing me to participate in the care of your patient.        Sincerely,        Katherine Lopez MD

## 2018-06-01 ENCOUNTER — TELEPHONE (OUTPATIENT)
Dept: NEUROLOGY | Facility: CLINIC | Age: 83
End: 2018-06-01

## 2018-06-01 ENCOUNTER — PATIENT OUTREACH (OUTPATIENT)
Dept: CARE COORDINATION | Facility: CLINIC | Age: 83
End: 2018-06-01

## 2018-06-01 DIAGNOSIS — F02.818 LATE ONSET ALZHEIMER'S DISEASE WITH BEHAVIORAL DISTURBANCE (H): Primary | ICD-10-CM

## 2018-06-01 DIAGNOSIS — G30.1 LATE ONSET ALZHEIMER'S DISEASE WITH BEHAVIORAL DISTURBANCE (H): Primary | ICD-10-CM

## 2018-06-01 RX ORDER — QUETIAPINE FUMARATE 25 MG/1
12.5 TABLET, FILM COATED ORAL 2 TIMES DAILY PRN
Qty: 30 TABLET | Refills: 1 | Status: SHIPPED | OUTPATIENT
Start: 2018-06-01 | End: 2018-08-07

## 2018-06-01 NOTE — TELEPHONE ENCOUNTER
Please let Evin's family know that Dr. Perez is in agreement with trying a low dose of Seroquel for Evin. Rx will be for 12.5mg BID as needed.   I still think they should try the melatonin first, for sleep. This medication is for his agitation/anxious behaviors. Sent to [facility].  Thank you,  Katherine Lopez MD

## 2018-06-01 NOTE — TELEPHONE ENCOUNTER
"Spoke with Pat, the pt's spouse (authorization to discuss PHI on file).  She had no questions about the medication and agreed to try it.  She did have other questions for Dr. Lopez though that she didn't feel comfortable asking in front of her  and daughter at the time in clinic.  She asked to speak directly with the provider but was informed she is not in clinic today.  I told her I could relay the questions to the provider though and she agreed.      \"[place]  [place] and since he can't, I can't and I don't understand.\"      \"What stage is my  in with alzheimer's?\"    I told Gema someone would call her back possibly later today, otherwise early next week.      Ml RASHID, CMA    "

## 2018-06-01 NOTE — PROGRESS NOTES
Clinic Care Coordination Contact  [facility]/Voicemail       Clinical Data: Care Coordinator Outreach  Outreach attempted x 1.  Left message on voicemail with call back information and requested return call.  Plan: Care Coordinator will try to reach patient again in 3-5 business days.    GUIDO Adams  [facility]   [phone]  [email or web address]

## 2018-06-05 ENCOUNTER — TELEPHONE (OUTPATIENT)
Dept: NEUROLOGY | Facility: CLINIC | Age: 83
End: 2018-06-05

## 2018-06-05 ASSESSMENT — ACTIVITIES OF DAILY LIVING (ADL)
DEPENDENT_IADLS:: CLEANING;COOKING;LAUNDRY;SHOPPING;MEAL PREPARATION;MEDICATION MANAGEMENT;MONEY MANAGEMENT;TRANSPORTATION

## 2018-06-05 NOTE — TELEPHONE ENCOUNTER
Spoke with Evin's wife today. All questions were answered. We agree to try the quetiapine just at night, because it makes him tired.  CY

## 2018-06-05 NOTE — PROGRESS NOTES
Clinic Care Coordination Contact  Clinic Care Coordination Contact  OUTREACH    Referral Information:  Referral Source: Specialist    Primary Diagnosis: Psychosocial    Chief Complaint   Patient presents with     Clinic Care Coordination - Follow-up     Future Planning      Universal Utilization: No concerns  Utilization    Last refreshed: 6/5/2018  8:50 AM:  No Show Count (past year) 0       Last refreshed: 6/5/2018  8:50 AM:  ED visits 0       Last refreshed: 6/5/2018  8:50 AM:  Hospital admissions 0          Current as of: 6/5/2018  8:50 AM           Clinical Concerns:  Current Medical Concerns:  Pt has Alzheimer's disease with behavioral disturbance. Pt presented to the Neurologist due to concerns about sleep disturbance and caregiver burnout. Pt was started on quetiapine at night to help with sleep disturbance. Conversation with caregivers about transitioning pt to memory care. Pt's daughter does not feel that it is necessary at this time.    Current Behavioral Concerns: Lack of sleep, at time pt lacks insight into his deficits, social isolation  Education Provided to patient: [facility] Reviewed: Due/Overdue     Medication Management:  Pt is dependent on caregiver for administration, Pt was recently started on quetiapine for behavioral/sleep disturbance related to Alzheimer's    Functional Status:  Dependent ADLs:: Ambulation-no assistive device  Dependent IADLs:: Cleaning, Cooking, Laundry, Shopping, Meal Preparation, Medication Management, Money Management, Transportation  Mobility Status: Independent- needs cueing and redirection at times    Living Situation:  Current living arrangement:: I live in a private home with spouse- May be appropriate for memory care, however, family is not sure he is at that level yet.   Type of residence:: Private home - stairs    Diet/Sleep:  Diet:: Regular  Significant changes in sleep pattern: Yes- not sleeping at night, frequent bathroom breaks,  etc.      Psychosocial:  Financial/Insurance: [facility], Pt's daughter is assisting with future planning. Outreach to pt's daughter today to discuss [facility] support services and payment for memory care. Pt and spouse have about $50,000 in assets and own two properties including their trailor and a cabin which is split amongst pt, pt's spouse, daughter and son. Provided brief explanation of asset limits and how memory care is paid for. Discussed limited availability through Elderly Waiver in [facility] unit and compared [facility] vs. SNF. Described information and sent an e-mail to pt's daughter with literature regarding waiver services. Encouraged they meet with an Elder Law  and pt's daughter reports that they had in the past.      Resources and Interventions:  Current Resources:   Equipment Currently Used at Home: none  Advanced Care Plans/Directives on file:: Yes  Type Advanced Care Plans/Directives: POLST, DNR/DNI  Referrals Placed: [facility]  Patient/Caregiver understanding: Pt reports understanding and denies any additional questions or concerns at this times. KAYLEEN PROCTOR engaged in AIDET communication during encounter.    Outreach Frequency: monthly  Future Appointments              In 6 days [facility]    In 1 week Anitra Montaño PA-C [facility]        Plan: KAYLEEN PROCTOR sent e-mail with information. Pt's daughter to review and contact KAYLEEN PROCTOR with any questions. KAYLEEN CC to follow-up in 4 weeks.

## 2018-06-29 ENCOUNTER — TELEPHONE (OUTPATIENT)
Dept: CARDIOLOGY | Facility: CLINIC | Age: 83
End: 2018-06-29

## 2018-06-29 DIAGNOSIS — I25.10 CORONARY ARTERY DISEASE INVOLVING NATIVE CORONARY ARTERY, ANGINA PRESENCE UNSPECIFIED, UNSPECIFIED WHETHER NATIVE OR TRANSPLANTED HEART: ICD-10-CM

## 2018-06-29 NOTE — TELEPHONE ENCOUNTER
"Refill request received for Plavix 75 mg QD. Reviewed chart. Last stenting done 2009. Last cardiology dictation states, \"...He had stopped his Plavix prematurely in the past with recurrent chest discomfort, was taken to the Cardiac Catheterization Laboratory with diffuse pain symptoms but no evidence of thrombosis or dissection....\" Pt was to follow up with cardiology QUEENIE in June. That appt cancelled per MyChart message from daughter stating pt cognition declining and is agitated regarding so many appts. Will review with Dr. Fisher if patient should continue on Plavix. Krystin Cazares RN Cardiology at [facility] June 29, 2018, 9:54 AM    ADDENDUM: Discussed with Dr. Fisher in person. No changes at this time. Pt to continue on Plavix. Refill sent. Krystin Cazares RN Cardiology at [facility] July 16, 2018, 3:45 PM  "

## 2018-07-16 RX ORDER — CLOPIDOGREL BISULFATE 75 MG/1
75 TABLET ORAL DAILY
Qty: 90 TABLET | Refills: 3 | Status: SHIPPED | OUTPATIENT
Start: 2018-07-16 | End: 2019-01-01

## 2018-07-17 ENCOUNTER — PATIENT OUTREACH (OUTPATIENT)
Dept: CARE COORDINATION | Facility: CLINIC | Age: 83
End: 2018-07-17

## 2018-07-17 NOTE — PROGRESS NOTES
Clinic Care Coordination Contact  Clinic Care Coordination Contact  OUTREACH    Referral Information:  Referral Source: Specialist    Primary Diagnosis: Psychosocial    Chief Complaint   Patient presents with     Clinic Care Coordination - Follow-up     Resource Navigation      Clinical Concerns:  Current Medical Concerns:  Pt has been diagnosed with late onset Alzheimer's disease with behavioral disturbance. Pt is followed by Neurology. Outreach today to follow-up with daughter on resources that had been sent via e-mail. Pt's daughter reports that she had contacted the [facility] to set-up a [facility] assessment, but missed the return call. Pt's daughter will call back [facility] to set up assessment. Pt's daughter denied any questions about the information.      Current Behavioral Concerns: Slightly more forgetful with environmental changes      Patient/Caregiver understanding: Pt reports understanding and denies any additional questions or concerns at this times. KAYLEEN PROCTOR engaged in AIDET communication during encounter.    Outreach Frequency: No further outreaches will be made at this time unless a new referral is made or a change in the pt's status occurs. Patient was provided with this writer's contact information and encouraged to call with any questions or concerns.    Plan: KAYLEEN PROCTOR will be available to family if needed.    GUIDO Adams  Clinic Care Coordinator  [phone]  [email or web address]

## 2018-08-07 DIAGNOSIS — G30.1 LATE ONSET ALZHEIMER'S DISEASE WITH BEHAVIORAL DISTURBANCE (H): ICD-10-CM

## 2018-08-07 DIAGNOSIS — F02.818 LATE ONSET ALZHEIMER'S DISEASE WITH BEHAVIORAL DISTURBANCE (H): ICD-10-CM

## 2018-08-07 NOTE — TELEPHONE ENCOUNTER
Reason for Call:  Medication or medication refill:    Do you use a [facility]?  Name of the pharmacy and phone number for the current request:  [facility] [phone]    Name of the medication requested: QUEtiapine    Other request: LOV:  5/31/18  LAST REFILL:  8/7/18    Can we leave a detailed message on this number? YES    Phone number patient can be reached at: Home number on file [phone] (home)     Best Time: any     Call taken on 8/7/2018 at 1:49 PM by Daria Alfonso

## 2018-08-08 RX ORDER — QUETIAPINE FUMARATE 25 MG/1
12.5 TABLET, FILM COATED ORAL 2 TIMES DAILY PRN
Qty: 30 TABLET | Refills: 1 | Status: SHIPPED | OUTPATIENT
Start: 2018-08-08 | End: 2018-01-01 | Stop reason: SINTOL

## 2018-10-11 ENCOUNTER — TELEPHONE (OUTPATIENT)
Dept: NEUROLOGY | Facility: CLINIC | Age: 83
End: 2018-10-11

## 2018-10-11 NOTE — TELEPHONE ENCOUNTER
Reason for Call:  Form, our goal is to have forms completed with 72 hours, however, some forms may require a visit or additional information.    Type of letter, form or note:  medical    Who is the form from?: [facility]    Where did the form come from: form was faxed in    What clinic location was the form placed at?: [facility] (ENT, Neurology, Rheumatology, Surgery, Urology, Vascular Surgery)    Where the form was placed: Given to MA/RN     What number is listed as a contact on the form?: [phone]       Additional comments: Please fax completed form to:  Cora MARRERO  Fax #: [phone]    Call taken on 10/11/2018 at 2:32 PM by Denise Behrendt

## 2018-10-15 NOTE — TELEPHONE ENCOUNTER
Completed form faxed to Cora at [facility] [phone].  Transmission confirmed via Right Fax.  No provider signature necessary.

## 2018-10-29 NOTE — TELEPHONE ENCOUNTER
Agree that a different living arrangements need to be discussed with family for his safety and to help family members in decreasing difficulty in his care.  Appreciate neurology help.    CSW referral possibly if needed further coordination of care and looking for possible referral to a different housing or living arrangement.

## 2018-11-14 NOTE — PROGRESS NOTES
ESTABLISHED PATIENT NEUROLOGY NOTE    DATE OF VISIT: 11/14/2018  MRN: [id number]  PATIENT NAME: Evin Melgar  YOB: 1934    Chief Complaint   Patient presents with     RECHECK     AD     SUBJECTIVE:                                                      HISTORY OF PRESENT ILLNESS:  Evin is here for follow up regarding dementia. He has likely Alzheimer s disease based on clinical picture and testing. When I met with the patient and his family 6 months ago, they reported that he was still living with his wife. There was concern that additional help was needed at home. The patient s daughter is familiar with geriatric care situations and did not feel that Evin needed placement in Memory Care as of yet.     There were concerns about poor sleep, so I recommended a low dose of melatonin and we also discussed trial of Seroquel, which his primary care provider was in agreement with (Dr. Perez).     I also asked our  to reach out about home care options for the patient. There are phone calls in the chart from last month regarding family's change of heart regarding placement (see below).    Today the patient is here with his daughter, and his wife. They are planning to at least get home care until he can be placed. They are on waiting lists at multiple facilities. He takes 1/2 a tablet of Seroquel at night. Daughter says he has been having more hallucinations since starting the medication and the agitation/wandering has not necessarily decreased. On the other hand, they do think that the melatonin has helped somewhat. He takes 5mg at bedtime.     They are concerned about the bladder incontinence, which is new. The patient perseverates on some problems the past week with loose stools. Wife confirms this. They mention that Dr. Perez gave them something to try for this but Evin refuses to take it. He is also very paranoid lately.     Phone note from the patient's daughter (10.29.18):   I just  wanted to update you on my dad.  My mom is finding it increasingly difficult to take care of him in the home.  He is not sleeping at night,(even with the prescribed medication plus melatonin) is very agitated, obsessive, combative and paranoid all the time. He has escaped at night(we have since put in a deadbolt) and had an episode where he thought we were going to waterboard him.  All his behaviors are of course because of his Alzheimers progressing.  He is beginning to be incontinent, and will urinate anywhere during the night.  We are looking into memory care options and plan to tour facilities next week.  This of course something that breaks our hearts but we cannot have our mom take this on anymore.  As he is on MA I am sure the waiting lists will be long but I just wanted to give you an update on how he is doing.  If you could please forward to Dr. Perez that would be great.  I am sure   we will need to see one of you before he admits anywhere for orders.   Thank you for all your help with my dad.   Sruthi Brian       CURRENT MEDICATIONS:     Current Outpatient Prescriptions on File Prior to Visit:  albuterol (PROAIR HFA) 108 (90 BASE) MCG/ACT Inhaler Inhale 2 puffs into the lungs every 4 hours as needed for shortness of breath / dyspnea Rinse mouthpiece weekly.   amLODIPine (NORVASC) 5 MG tablet Take 1 tablet (5 mg) by mouth daily   atorvastatin (LIPITOR) 20 MG tablet Take 1 tablet (20 mg) by mouth daily   clopidogrel (PLAVIX) 75 MG tablet Take 1 tablet (75 mg) by mouth daily   fluticasone (FLONASE) 50 MCG/ACT spray Spray 1-2 sprays into both nostrils daily   lisinopril (PRINIVIL/ZESTRIL) 2.5 MG tablet Take 1 tablet (2.5 mg) by mouth daily   metoprolol succinate (TOPROL-XL) 25 MG 24 hr tablet Take 1 tablet (25 mg) by mouth daily   nitroglycerin (NITROSTAT) 0.4 MG SL tablet Place 1 tablet (0.4 mg) under the tongue every 5 minutes as needed for chest pain for chest pain   ranitidine (ZANTAC) 150 MG tablet Take  "1 tablet (150 mg) by mouth 2 times daily as needed for heartburn   guaiFENesin (ORGANIDIN) 200 MG TABS tablet Take 2 tablets (400 mg) by mouth every 6 hours as needed for cough (Patient not taking: Reported on 2018)     No current facility-administered medications on file prior to visit.     RECENT DIAGNOSTIC STUDIES:   Labs:   Results for orders placed or performed in visit on 18   Basic metabolic panel   Result Value Ref Range    Sodium 139 133 - 144 mmol/L    Potassium 3.7 3.4 - 5.3 mmol/L    Chloride 105 94 - 109 mmol/L    Carbon Dioxide 27 20 - 32 mmol/L    Anion Gap 7 3 - 14 mmol/L    Glucose 106 (H) 70 - 99 mg/dL    Urea Nitrogen 18 7 - 30 mg/dL    Creatinine 1.26 (H) 0.66 - 1.25 mg/dL    GFR Estimate 55 (L) >60 mL/min/1.7m2    GFR Estimate If Black 66 >60 mL/min/1.7m2    Calcium 8.7 8.5 - 10.1 mg/dL   Lipid Profile   Result Value Ref Range    Cholesterol 138 <200 mg/dL    Triglycerides 86 <150 mg/dL    HDL Cholesterol 64 >39 mg/dL    LDL Cholesterol Calculated 57 <100 mg/dL    Non HDL Cholesterol 74 <130 mg/dL   ALT   Result Value Ref Range    ALT 19 0 - 70 U/L       REVIEW OF SYSTEMS:                                                      10-point review of systems is negative except as mentioned above in HPI.     EXAM:                                                      Physical Exam:   Vitals: /60 (BP Location: Right arm, Patient Position: Chair, Cuff Size: Adult Regular)  Pulse 75  Temp 98.5  F (36.9  C) (Tympanic)  Resp 16  Ht 1.613 m (5' 3.5\")  Wt 49.3 kg (108 lb 9.6 oz)  BMI 18.94 kg/m2  BMI= Body mass index is 18.94 kg/(m^2).  GENERAL: NAD.   MENTAL STATUS: Alert, attentive though easily distracted, perseverative. Speech is fluent. Fair comprehension with cues. Mini-Co.   CRANIAL NERVES: Visual fields intact to confrontation. Pupils equally, round and reactive to light. Facial sensation and movement normal. EOM full. Hearing intact to conversation. Trapezius strength " intact. Palate moves symmetrically. Tongue midline.  MOTOR: 4+/5 in proximal and distal muscle groups of lower extremities. Tone and bulk normal.    COORDINATION: Slow, but normal finger nose finger. Hand opening/closing appears normal.  STATION AND GAIT: Gait is cautious, stooped posture.  CV: RRR. S1, S2.    NECK: No bruits.    ASSESSMENT and PLAN:                                                      Assessment and Plan:    ICD-10-CM    1. Late onset Alzheimer's disease with behavioral disturbance G30.1     F02.81         Mr. Melgar is a pleasant 84 yo man with dementia, likely Alzheimer's disease with continued progression and behavioral problems. We spent the majority of Evin's visit discussing future planning. I agree with the family's plan to get Evin into a memory care facility. I asked them to let us know if there is more our office/SW can do to help facilitate this process. We will stop the Seroquel, given the concerns about hallucinations and unclear effectiveness. I will see Evin back in clinic in 6 months. The patient's family understands and agrees with the plan.     Patient Instructions:  Stop the Seroquel (quetiapine) at bedtime, since you have noticed the hallucinations in correlation with the medication use. I will say that the hallucinations may just be representative of worsening of Evin's dementia as well. Something to keep in mind.  Continue the melatonin for sleep aid.  Let us know if you need any help in terms of facilitating home health/memory care placement.   Return to neurology in 6 months.     Total Time: 40 minutes were spent with the patient and his family. More than 50% of the time spent on counseling (as described above in Assessment and Plan) /coordinating the care.    Katherine Neumann MD  Neurology

## 2018-11-14 NOTE — LETTER
11/14/2018         RE: Evin Meglar  [address]  [address]  [facility] [address]        Dear Colleague,    Thank you for referring your patient, Evin Melgar, to the [facility]. Please see a copy of my visit note below.    ESTABLISHED PATIENT NEUROLOGY NOTE    DATE OF VISIT: 11/14/2018  MRN: [id number]  PATIENT NAME: Evin Melgar  YOB: 1934    Chief Complaint   Patient presents with     RECHECK     AD     SUBJECTIVE:                                                      HISTORY OF PRESENT ILLNESS:  Evin is here for follow up regarding dementia. He has likely Alzheimer s disease based on clinical picture and testing. When I met with the patient and his family 6 months ago, they reported that he was still living with his wife. There was concern that additional help was needed at home. The patient s daughter is familiar with geriatric care situations and did not feel that Evin needed placement in Memory Care as of yet.     There were concerns about poor sleep, so I recommended a low dose of melatonin and we also discussed trial of Seroquel, which his primary care provider was in agreement with (Dr. Perez).     I also asked our  to reach out about home care options for the patient. There are phone calls in the chart from last month regarding family's change of heart regarding placement (see below).    Today the patient is here with his daughter, and his wife. They are planning to at least get home care until he can be placed. They are on waiting lists at multiple facilities. He takes 1/2 a tablet of Seroquel at night. Daughter says he has been having more hallucinations since starting the medication and the agitation/wandering has not necessarily decreased. On the other hand, they do think that the melatonin has helped somewhat. He takes 5mg at bedtime.     They are concerned about the bladder incontinence, which is new. The patient perseverates on  some problems the past week with loose stools. Wife confirms this. They mention that Dr. Perez gave them something to try for this but Evin refuses to take it. He is also very paranoid lately.     Phone note from the patient's daughter (10.29.18):   I just wanted to update you on my dad.  My mom is finding it increasingly difficult to take care of him in the home.  He is not sleeping at night,(even with the prescribed medication plus melatonin) is very agitated, obsessive, combative and paranoid all the time. He has escaped at night(we have since put in a deadbolt) and had an episode where he thought we were going to waterboard him.  All his behaviors are of course because of his Alzheimers progressing.  He is beginning to be incontinent, and will urinate anywhere during the night.  We are looking into memory care options and plan to tour facilities next week.  This of course something that breaks our hearts but we cannot have our mom take this on anymore.  As he is on MA I am sure the waiting lists will be long but I just wanted to give you an update on how he is doing.  If you could please forward to Dr. Perez that would be great.  I am sure   we will need to see one of you before he admits anywhere for orders.   Thank you for all your help with my dad.   Sruthi Torres       CURRENT MEDICATIONS:     Current Outpatient Prescriptions on File Prior to Visit:  albuterol (PROAIR HFA) 108 (90 BASE) MCG/ACT Inhaler Inhale 2 puffs into the lungs every 4 hours as needed for shortness of breath / dyspnea Rinse mouthpiece weekly.   amLODIPine (NORVASC) 5 MG tablet Take 1 tablet (5 mg) by mouth daily   atorvastatin (LIPITOR) 20 MG tablet Take 1 tablet (20 mg) by mouth daily   clopidogrel (PLAVIX) 75 MG tablet Take 1 tablet (75 mg) by mouth daily   fluticasone (FLONASE) 50 MCG/ACT spray Spray 1-2 sprays into both nostrils daily   lisinopril (PRINIVIL/ZESTRIL) 2.5 MG tablet Take 1 tablet (2.5 mg) by mouth daily   metoprolol  "succinate (TOPROL-XL) 25 MG 24 hr tablet Take 1 tablet (25 mg) by mouth daily   nitroglycerin (NITROSTAT) 0.4 MG SL tablet Place 1 tablet (0.4 mg) under the tongue every 5 minutes as needed for chest pain for chest pain   ranitidine (ZANTAC) 150 MG tablet Take 1 tablet (150 mg) by mouth 2 times daily as needed for heartburn   guaiFENesin (ORGANIDIN) 200 MG TABS tablet Take 2 tablets (400 mg) by mouth every 6 hours as needed for cough (Patient not taking: Reported on 5/31/2018)     No current facility-administered medications on file prior to visit.     RECENT DIAGNOSTIC STUDIES:   Labs:   Results for orders placed or performed in visit on 03/13/18   Basic metabolic panel   Result Value Ref Range    Sodium 139 133 - 144 mmol/L    Potassium 3.7 3.4 - 5.3 mmol/L    Chloride 105 94 - 109 mmol/L    Carbon Dioxide 27 20 - 32 mmol/L    Anion Gap 7 3 - 14 mmol/L    Glucose 106 (H) 70 - 99 mg/dL    Urea Nitrogen 18 7 - 30 mg/dL    Creatinine 1.26 (H) 0.66 - 1.25 mg/dL    GFR Estimate 55 (L) >60 mL/min/1.7m2    GFR Estimate If Black 66 >60 mL/min/1.7m2    Calcium 8.7 8.5 - 10.1 mg/dL   Lipid Profile   Result Value Ref Range    Cholesterol 138 <200 mg/dL    Triglycerides 86 <150 mg/dL    HDL Cholesterol 64 >39 mg/dL    LDL Cholesterol Calculated 57 <100 mg/dL    Non HDL Cholesterol 74 <130 mg/dL   ALT   Result Value Ref Range    ALT 19 0 - 70 U/L       REVIEW OF SYSTEMS:                                                      10-point review of systems is negative except as mentioned above in HPI.     EXAM:                                                      Physical Exam:   Vitals: /60 (BP Location: Right arm, Patient Position: Chair, Cuff Size: Adult Regular)  Pulse 75  Temp 98.5  F (36.9  C) (Tympanic)  Resp 16  Ht 1.613 m (5' 3.5\")  Wt 49.3 kg (108 lb 9.6 oz)  BMI 18.94 kg/m2  BMI= Body mass index is 18.94 kg/(m^2).  GENERAL: NAD.   MENTAL STATUS: Alert, attentive though easily distracted, perseverative. Speech " is fluent. Fair comprehension with cues. Mini-Co/5.   CRANIAL NERVES: Visual fields intact to confrontation. Pupils equally, round and reactive to light. Facial sensation and movement normal. EOM full. Hearing intact to conversation. Trapezius strength intact. Palate moves symmetrically. Tongue midline.  MOTOR: 4+/5 in proximal and distal muscle groups of lower extremities. Tone and bulk normal.    COORDINATION: Slow, but normal finger nose finger. Hand opening/closing appears normal.  STATION AND GAIT: Gait is cautious, stooped posture.  CV: RRR. S1, S2.    NECK: No bruits.    ASSESSMENT and PLAN:                                                      Assessment and Plan:    ICD-10-CM    1. Late onset Alzheimer's disease with behavioral disturbance G30.1     F02.81         Mr. Melgar is a pleasant 82 yo man with dementia, likely Alzheimer's disease with continued progression and behavioral problems. We spent the majority of Evin's visit discussing future planning. I agree with the family's plan to get Evin into a memory care facility. I asked them to let us know if there is more our office/SW can do to help facilitate this process. We will stop the Seroquel, given the concerns about hallucinations and unclear effectiveness. I will see Evin back in clinic in 6 months. The patient's family understands and agrees with the plan.     Patient Instructions:  Stop the Seroquel (quetiapine) at bedtime, since you have noticed the hallucinations in correlation with the medication use. I will say that the hallucinations may just be representative of worsening of Evin's dementia as well. Something to keep in mind.  Continue the melatonin for sleep aid.  Let us know if you need any help in terms of facilitating home health/memory care placement.   Return to neurology in 6 months.     Total Time: 40 minutes were spent with the patient and his family. More than 50% of the time spent on counseling (as described above in  Assessment and Plan) /coordinating the care.    Katherine Neumann MD  Neurology                    Again, thank you for allowing me to participate in the care of your patient.        Sincerely,        Katherine Neumann MD

## 2018-11-14 NOTE — PATIENT INSTRUCTIONS
Plan:    Stop the Seroquel (quetiapine) at bedtime, since you have noticed the hallucinations in correlation with the medication use. I will say that the hallucinations may just be representative of worsening of Evin's dementia as well. Something to keep in mind.  Continue the melatonin for sleep aid.  Let us know if you need any help in terms of facilitating home health/memory care placement.   Return to neurology in 6 months.

## 2018-11-14 NOTE — NURSING NOTE
"Chief Complaint   Patient presents with     RECHECK     AD       Initial /60 (BP Location: Right arm, Patient Position: Chair, Cuff Size: Adult Regular)  Pulse 75  Temp 98.5  F (36.9  C) (Tympanic)  Resp 16  Ht 1.613 m (5' 3.5\")  Wt 49.3 kg (108 lb 9.6 oz)  BMI 18.94 kg/m2 Estimated body mass index is 18.94 kg/(m^2) as calculated from the following:    Height as of this encounter: 1.613 m (5' 3.5\").    Weight as of this encounter: 49.3 kg (108 lb 9.6 oz).  Medications and allergies reviewed.    Suri WEBER, CMA    "

## 2018-11-14 NOTE — TELEPHONE ENCOUNTER
I've left a voicemail message and MyChart message for daughter, Sruthi.  Dr. Neumann would like to move Evin's 3:00 appt today to tomorrow 11/15 at 8am (preferred), or to 12:45 today (second choice).  If they absolutely can't do either of those we'll plan to see him at 3:00 this afternoon.

## 2018-11-20 NOTE — LETTER
[facility]  [address] [place] [address]  Phone: [phone]       November 21, 2018         Evin Melgar  [address]    [place] [facility] [place] [facility] [address]            Dear Evin:    We are concerned about your health care.  We recently provided you with medication refills.  Many medications require routine follow-up with your doctor.     Your prescription(s) have been refilled for 30 days so you may have time for the above noted follow-up. Please call to schedule soon so we can assure you have an appointment before your next refills are needed.    Thank you,      Brian Perez MD / hiral

## 2018-11-20 NOTE — TELEPHONE ENCOUNTER
"Requested Prescriptions   Pending Prescriptions Disp Refills     albuterol (PROAIR HFA) 108 (90 Base) MCG/ACT inhaler 1 Inhaler 11    Last Written Prescription Date:  12/20/17  Last Fill Quantity: 1inhaler,  # refills: 11   Last office visit: 1/16/2018 with prescribing provider:  Ana   Future Office Visit:   Next 5 appointments (look out 90 days)     Nov 28, 2018  3:00 PM CST   Return Visit with Huang Toledo MD   [facility]    [address] [place] [id number] [phone]                  Sig: Inhale 2 puffs into the lungs every 4 hours as needed for shortness of breath / dyspnea Rinse mouthpiece weekly.    Asthma Maintenance Inhalers - Anticholinergics Failed    11/20/2018 11:16 AM       Failed - Asthma control assessment score within normal limits in last 6 months    Please review ACT score.          Passed - Patient is age 12 years or older       Passed - Recent (6 mo) or future (30 days) visit within the authorizing provider's specialty    Patient had office visit in the last 6 months or has a visit in the next 30 days with authorizing provider or within the authorizing provider's specialty.  See \"Patient Info\" tab in inbasket, or \"Choose Columns\" in Meds & Orders section of the refill encounter.              "

## 2018-11-21 NOTE — TELEPHONE ENCOUNTER
Due for clinic visit and ACT update 30 day damian fill given. Reminder letter sent.     ACT Total Scores 2/6/2017 6/8/2017 12/5/2017   ACT TOTAL SCORE - - -   ASTHMA ER VISITS - - -   ASTHMA HOSPITALIZATIONS - - -   ACT TOTAL SCORE (Goal Greater than or Equal to 20) 11 17 12   In the past 12 months, how many times did you visit the emergency room for your asthma without being admitted to the hospital? 0 0 0   In the past 12 months, how many times were you hospitalized overnight because of your asthma? 0 0 0        Delores MERCEDES RN

## 2018-11-28 PROBLEM — F02.80 ALZHEIMER'S DISEASE (H): Status: ACTIVE | Noted: 2018-01-01

## 2018-11-28 PROBLEM — G30.9 ALZHEIMER'S DISEASE (H): Status: ACTIVE | Noted: 2018-01-01

## 2018-11-28 NOTE — MR AVS SNAPSHOT
After Visit Summary   11/28/2018    Evin Melgar    MRN: [id number]           Patient Information     Date Of Birth          6/1/1934        Visit Information        Provider Department      11/28/2018 3:00 PM Huang Toledo MD [facility]        Today's Diagnoses     Coronary artery disease involving native coronary artery of native heart without angina pectoris    -  1    Essential hypertension with goal blood pressure less than 140/90        Hyperlipidemia LDL goal <100        Syncope and collapse        Alzheimer's disease           Follow-ups after your visit        Additional Services     Follow-Up with Cardiac Advanced Practice Provider                 Future tests that were ordered for you today     Open Future Orders        Priority Expected Expires Ordered    Follow-Up with Cardiac Advanced Practice Provider Routine 11/28/2019 11/29/2019 11/28/2018            Who to contact     If you have questions or need follow up information about today's clinic visit or your schedule please contact [facility] directly at [phone].  Normal or non-critical lab and imaging results will be communicated to you by [employer], letter or phone within 4 business days after the clinic has received the results. If you do not hear from us within 7 days, please contact the clinic through [employer] or phone. If you have a critical or abnormal lab result, we will notify you by phone as soon as possible.  Submit refill requests through [employer] or call your pharmacy and they will forward the refill request to us. Please allow 3 business days for your refill to be completed.          Additional Information About Your Visit        [employer] Information     [employer] gives you secure access to your electronic health record. If you see a primary care provider, you can also send messages to your care team and make appointments. If you have  questions, please call your primary care clinic.  If you do not have a primary care provider, please call [phone] and they will assist you.        Care EveryWhere ID     This is your Care EveryWhere ID. This could be used by other organizations to access your [facility] medical records  [id number]        Your Vitals Were     Pulse Pulse Oximetry BMI (Body Mass Index)             69 99% 18.31 kg/m2          Blood Pressure from Last 3 Encounters:   11/28/18 118/66   11/14/18 133/60   05/31/18 139/67    Weight from Last 3 Encounters:   11/28/18 47.6 kg (105 lb)   11/14/18 49.3 kg (108 lb 9.6 oz)   05/31/18 49.8 kg (109 lb 12.8 oz)              We Performed the Following     Follow-Up with Cardiologist          Today's Medication Changes          These changes are accurate as of 11/28/18  3:09 PM.  If you have any questions, ask your nurse or doctor.               Stop taking these medicines if you haven't already. Please contact your care team if you have questions.     guaiFENesin 200 MG Tabs tablet   Commonly known as:  ORGANIDIN   Stopped by:  Huang Toledo MD                    Primary Care Provider Office Phone # Fax #    Brian Mehdi Perez [phone] [address] [address]        Equal Access to Services     LEANDER MENJIVAR AH: Hadii aby whitaker hadasho Somelviali, waaxda luqadaha, qaybta kaalmada juan, westley lowery. So [facility] [phone].    ATENCIÓN: Si habla español, tiene a vargas disposición servicios gratuitos de asistencia lingüística. Llame al [phone].    We comply with applicable federal civil rights laws and [place] laws. We do not discriminate on the basis of race, color, national origin, age, disability, sex, sexual orientation, or gender identity.            Thank you!     Thank you for choosing [facility]  for your care. Our goal is always to provide you with excellent  care. Hearing back from our patients is one way we can continue to improve our services. Please take a few minutes to complete the written survey that you may receive in the mail after your visit with us. Thank you!             Your Updated Medication List - Protect others around you: Learn how to safely use, store and throw away your medicines at www.disposemymeds.org.          This list is accurate as of 11/28/18  3:09 PM.  Always use your most recent med list.                   Brand Name Dispense Instructions for use Diagnosis    albuterol 108 (90 Base) MCG/ACT inhaler    PROAIR HFA    1 Inhaler    Inhale 2 puffs into the lungs every 4 hours as needed for shortness of breath / dyspnea Rinse mouthpiece weekly.    Moderate persistent asthma without complication       amLODIPine 5 MG tablet    NORVASC    90 tablet    Take 1 tablet (5 mg) by mouth daily    Essential hypertension with goal blood pressure less than 140/90       atorvastatin 20 MG tablet    LIPITOR    90 tablet    Take 1 tablet (20 mg) by mouth daily    Hyperlipidemia LDL goal <100       clopidogrel 75 MG tablet    PLAVIX    90 tablet    Take 1 tablet (75 mg) by mouth daily    Coronary artery disease involving native coronary artery, angina presence unspecified, unspecified whether native or transplanted heart       fluticasone 50 MCG/ACT nasal spray    FLONASE    16 g    Spray 1-2 sprays into both nostrils daily    Postnasal drip       lisinopril 2.5 MG tablet    PRINIVIL/Zestril    90 tablet    Take 1 tablet (2.5 mg) by mouth daily    Essential hypertension with goal blood pressure less than 140/90       metoprolol succinate 25 MG 24 hr tablet    TOPROL-XL    90 tablet    Take 1 tablet (25 mg) by mouth daily    Syncope and collapse       nitroGLYcerin 0.4 MG sublingual tablet    NITROSTAT    25 tablet    Place 1 tablet (0.4 mg) under the tongue every 5 minutes as needed for chest pain for chest pain    CAD (coronary artery disease)       ranitidine  150 MG tablet    ZANTAC    90 tablet    Take 1 tablet (150 mg) by mouth 2 times daily as needed for heartburn    Gastroesophageal reflux disease without esophagitis

## 2018-11-28 NOTE — PROGRESS NOTES
HPI and Plan:   See dictation    Orders Placed This Encounter   Procedures     Follow-Up with Cardiac Advanced Practice Provider       No orders of the defined types were placed in this encounter.      Medications Discontinued During This Encounter   Medication Reason     guaiFENesin (ORGANIDIN) 200 MG TABS tablet          Encounter Diagnoses   Name Primary?     Coronary artery disease involving native coronary artery of native heart without angina pectoris Yes     Essential hypertension with goal blood pressure less than 140/90      Hyperlipidemia LDL goal <100      Syncope and collapse      Alzheimer's disease        CURRENT MEDICATIONS:  Current Outpatient Prescriptions   Medication Sig Dispense Refill     albuterol (PROAIR HFA) 108 (90 Base) MCG/ACT inhaler Inhale 2 puffs into the lungs every 4 hours as needed for shortness of breath / dyspnea Rinse mouthpiece weekly. 1 Inhaler 0     amLODIPine (NORVASC) 5 MG tablet Take 1 tablet (5 mg) by mouth daily 90 tablet 3     atorvastatin (LIPITOR) 20 MG tablet Take 1 tablet (20 mg) by mouth daily 90 tablet 3     clopidogrel (PLAVIX) 75 MG tablet Take 1 tablet (75 mg) by mouth daily 90 tablet 3     fluticasone (FLONASE) 50 MCG/ACT spray Spray 1-2 sprays into both nostrils daily 16 g 0     lisinopril (PRINIVIL/ZESTRIL) 2.5 MG tablet Take 1 tablet (2.5 mg) by mouth daily 90 tablet 3     metoprolol succinate (TOPROL-XL) 25 MG 24 hr tablet Take 1 tablet (25 mg) by mouth daily 90 tablet 3     nitroglycerin (NITROSTAT) 0.4 MG SL tablet Place 1 tablet (0.4 mg) under the tongue every 5 minutes as needed for chest pain for chest pain 25 tablet 3     ranitidine (ZANTAC) 150 MG tablet Take 1 tablet (150 mg) by mouth 2 times daily as needed for heartburn 90 tablet 2       ALLERGIES   No Known Allergies    PAST MEDICAL HISTORY:  Past Medical History:   Diagnosis Date     CAD (coronary artery disease) 9/4/2007    Unstable angina 8/07: status post PTCA/Cypher drug-eluting stent placed   "proximal LAD,  PTCA of OM. 10/07: stopped taking his plavix, went to [facility]--got another angiogram- stents patent.  6/08: angiogram repeated at [facility], unclear reason.  Unknown results. 1/09: echo with normal EF. 7/09: small reversible area in the inferior, basal.  Normal EF. Followed by [facility] - recommend indefinite use of paige     CKD (chronic kidney disease), stage III (H) 11/4/2008    GFRs in the 50's     GERD (gastroesophageal reflux disease) 11/4/2008    Controlled on zantac      Hyperlipidaemia      Hypertension 12/13/2005     Inguinal hernia without mention of obstruction or gangrene, unilateral or unspecified, (not specified as recurrent)      Memory deficit 3/2/2012    MMSE 23/30 2/12.       Microscopic hematuria 3/2/2012     Mild persistent asthma 12/13/2005    Uses proair once/day \"out of habit\".  Denies any sob.        OAB (overactive bladder) 3/2/2012    PVR 0 2/12.       Tubular adenoma 10/21/2008    Noted on 10/08 colonoscopy, without atypia     Unspecified essential hypertension        PAST SURGICAL HISTORY:  Past Surgical History:   Procedure Laterality Date     ANGIOGRAM  10/07    coronary=mild, diffuse CAD; stent below patent     ANGIOPLASTY  9/07    stent to ostia of LAD     SURGICAL HISTORY OF -       Appendectomy       FAMILY HISTORY:  Family History   Problem Relation Age of Onset     Cerebrovascular Disease Mother      Lipids Mother      Diabetes Father      Cerebrovascular Disease Father      Diabetes Brother      Heart Disease Brother        SOCIAL HISTORY:  Social History     Social History     Marital status:      Spouse name: payal     Number of children: N/A     Years of education: N/A     Occupational History      Retired     Social History Main Topics     Smoking status: Former Smoker     Quit date: 1/1/1949     Smokeless tobacco: Never Used     Alcohol use Yes      Comment: occasionally     Drug use: No     Sexual activity: Not Currently     Other Topics Concern     Caffeine " Concern No     Exercise No     Seat Belt Yes     Self-Exams No     Parent/Sibling W/ Cabg, Mi Or Angioplasty Before 65f 55m? No     Social History Narrative       Review of Systems:  Skin:  Positive for bruising     Eyes:  Negative      ENT:  Positive for tinnitus    Respiratory:  Positive for cough COPD, Asthma   Cardiovascular:    Positive for;fatigue;palpitations    Gastroenterology: Positive for heartburn;reflux;abdominal pain weight loss, loss of appetite  Genitourinary:  Positive for urinary frequency CKD- patient does not see nephrology  Musculoskeletal:  Positive for   shoulder pain wakes up with this in the morning.  Neurologic:  Positive for memory problems;headaches seen at the  for memory Alzheimers, will start seeing Dr. Lopez in July  Psychiatric:  Positive for anxiety;depression    Heme/Lymph/Imm:  Negative      Endocrine:  Negative        Physical Exam:  Vitals: /66  Pulse 69  Wt 47.6 kg (105 lb)  SpO2 99%  BMI 18.31 kg/m2    Constitutional:  cooperative;in no acute distress thin      Skin:  warm and dry to the touch          Head:  normocephalic        Eyes:  sclera white        Lymph:No Cervical lymphadenopathy present     ENT:  no pallor or cyanosis        Neck:  no stiffness        Respiratory:  clear to auscultation         Cardiac: regular rhythm                pulses full and equal                                        GI:  abdomen soft        Extremities and Muscular Skeletal:  no edema              Neurological:  affect appropriate        Psych:  Alert and Oriented x 3        CC  Say Fisher MD  [address]  [place] [id number]

## 2018-11-28 NOTE — LETTER
11/28/2018    Brian Perez MD  [address] [facility] [address]    RE: Evin Melgar       Dear Colleague,    I had the pleasure of seeing Evin Melgar in the [facility].    HPI and Plan:   See dictation    Orders Placed This Encounter   Procedures     Follow-Up with Cardiac Advanced Practice Provider       No orders of the defined types were placed in this encounter.      Medications Discontinued During This Encounter   Medication Reason     guaiFENesin (ORGANIDIN) 200 MG TABS tablet          Encounter Diagnoses   Name Primary?     Coronary artery disease involving native coronary artery of native heart without angina pectoris Yes     Essential hypertension with goal blood pressure less than 140/90      Hyperlipidemia LDL goal <100      Syncope and collapse      Alzheimer's disease        CURRENT MEDICATIONS:  Current Outpatient Prescriptions   Medication Sig Dispense Refill     albuterol (PROAIR HFA) 108 (90 Base) MCG/ACT inhaler Inhale 2 puffs into the lungs every 4 hours as needed for shortness of breath / dyspnea Rinse mouthpiece weekly. 1 Inhaler 0     amLODIPine (NORVASC) 5 MG tablet Take 1 tablet (5 mg) by mouth daily 90 tablet 3     atorvastatin (LIPITOR) 20 MG tablet Take 1 tablet (20 mg) by mouth daily 90 tablet 3     clopidogrel (PLAVIX) 75 MG tablet Take 1 tablet (75 mg) by mouth daily 90 tablet 3     fluticasone (FLONASE) 50 MCG/ACT spray Spray 1-2 sprays into both nostrils daily 16 g 0     lisinopril (PRINIVIL/ZESTRIL) 2.5 MG tablet Take 1 tablet (2.5 mg) by mouth daily 90 tablet 3     metoprolol succinate (TOPROL-XL) 25 MG 24 hr tablet Take 1 tablet (25 mg) by mouth daily 90 tablet 3     nitroglycerin (NITROSTAT) 0.4 MG SL tablet Place 1 tablet (0.4 mg) under the tongue every 5 minutes as needed for chest pain for chest pain 25 tablet 3     ranitidine (ZANTAC) 150 MG tablet Take 1 tablet (150 mg) by mouth 2 times daily as needed for  "heartburn 90 tablet 2       ALLERGIES   No Known Allergies    PAST MEDICAL HISTORY:  Past Medical History:   Diagnosis Date     CAD (coronary artery disease) 9/4/2007    Unstable angina 8/07: status post PTCA/Cypher drug-eluting stent placed  proximal LAD,  PTCA of OM. 10/07: stopped taking his plavix, went to [facility]--got another angiogram- stents patent.  6/08: angiogram repeated at [facility], unclear reason.  Unknown results. 1/09: echo with normal EF. 7/09: small reversible area in the inferior, basal.  Normal EF. Followed by [facility] - recommend indefinite use of paige     CKD (chronic kidney disease), stage III (H) 11/4/2008    GFRs in the 50's     GERD (gastroesophageal reflux disease) 11/4/2008    Controlled on zantac      Hyperlipidaemia      Hypertension 12/13/2005     Inguinal hernia without mention of obstruction or gangrene, unilateral or unspecified, (not specified as recurrent)      Memory deficit 3/2/2012    MMSE 23/30 2/12.       Microscopic hematuria 3/2/2012     Mild persistent asthma 12/13/2005    Uses proair once/day \"out of habit\".  Denies any sob.        OAB (overactive bladder) 3/2/2012    PVR 0 2/12.       Tubular adenoma 10/21/2008    Noted on 10/08 colonoscopy, without atypia     Unspecified essential hypertension        PAST SURGICAL HISTORY:  Past Surgical History:   Procedure Laterality Date     ANGIOGRAM  10/07    coronary=mild, diffuse CAD; stent below patent     ANGIOPLASTY  9/07    stent to ostia of LAD     SURGICAL HISTORY OF -       Appendectomy       FAMILY HISTORY:  Family History   Problem Relation Age of Onset     Cerebrovascular Disease Mother      Lipids Mother      Diabetes Father      Cerebrovascular Disease Father      Diabetes Brother      Heart Disease Brother        SOCIAL HISTORY:  Social History     Social History     Marital status:      Spouse name: payal     Number of children: N/A     Years of education: N/A     Occupational History      Retired     Social " History Main Topics     Smoking status: Former Smoker     Quit date: 1/1/1949     Smokeless tobacco: Never Used     Alcohol use Yes      Comment: occasionally     Drug use: No     Sexual activity: Not Currently     Other Topics Concern     Caffeine Concern No     Exercise No     Seat Belt Yes     Self-Exams No     Parent/Sibling W/ Cabg, Mi Or Angioplasty Before 65f 55m? No     Social History Narrative       Review of Systems:  Skin:  Positive for bruising     Eyes:  Negative      ENT:  Positive for tinnitus    Respiratory:  Positive for cough COPD, Asthma   Cardiovascular:    Positive for;fatigue;palpitations    Gastroenterology: Positive for heartburn;reflux;abdominal pain weight loss, loss of appetite  Genitourinary:  Positive for urinary frequency CKD- patient does not see nephrology  Musculoskeletal:  Positive for   shoulder pain wakes up with this in the morning.  Neurologic:  Positive for memory problems;headaches seen at the  for memory Alzheimers, will start seeing Dr. Lopez in July  Psychiatric:  Positive for anxiety;depression    Heme/Lymph/Imm:  Negative      Endocrine:  Negative        Physical Exam:  Vitals: /66  Pulse 69  Wt 47.6 kg (105 lb)  SpO2 99%  BMI 18.31 kg/m2    Constitutional:  cooperative;in no acute distress thin      Skin:  warm and dry to the touch          Head:  normocephalic        Eyes:  sclera white        Lymph:No Cervical lymphadenopathy present     ENT:  no pallor or cyanosis        Neck:  no stiffness        Respiratory:  clear to auscultation         Cardiac: regular rhythm                pulses full and equal                                        GI:  abdomen soft        Extremities and Muscular Skeletal:  no edema              Neurological:  affect appropriate        Psych:  Alert and Oriented x 3        CC  Say Fisher MD  [address]  [place] [id number]                Thank you for allowing me to participate in the care of your  patient.      Sincerely,     Huang Toledo MD     [facility]    cc:   Say Fisher MD  [address]  [place] [id number]

## 2018-11-28 NOTE — LETTER
11/28/2018      Brian Perez MD  [address] [facility] [address]      RE: Evin Jonesalycia       Dear Colleague,    I had the pleasure of seeing Evin Melgar in the [facility].    Service Date: 11/28/2018      HISTORY OF PRESENT ILLNESS:  Mr. Melgar is a pleasant 84-year-old gentleman with a history of coronary artery disease with prior PCI in 2009 to the LAD and obtuse marginal, who previously was following Dr. Fisher, who returns in routine 6-month followup.      Unfortunately, since we have seen the patient last, he has been diagnosed with progressive and fairly advanced Alzheimer's disease.      From a cardiovascular standpoint, he has been stable, denying any chest pain, pressure, shortness of breath, orthopnea or paroxysmal nocturnal dyspnea.      His wife, who accompanies him at this visit, feels that he is doing well.  Their main concern at today's visit is that he has persistent diarrhea and weight loss.  They are planning to schedule an appointment with Dr. Perez to discuss this further.      The patient had blood work performed prior to our visit.  His lipids show an LDL down to 38.  His BMP is stable with a GFR of 54 and creatinine of 1.27, which is near baseline.  His potassium is somewhat low, likely due to the diarrhea.      Please see my separate note with his full physical examination.      IMPRESSION AND PLAN:  Mr. Melgar is a pleasant 84-year-old gentleman with stable coronary artery disease as detailed above.      From a cardiovascular standpoint, I will continue the patient's antiplatelet in the form of clopidogrel 75 mg daily unchanged.  He will remain on atorvastatin 20 mg daily unchanged for secondary prevention.      Regarding his high blood pressure which is currently well controlled, he will remain on amlodipine and metoprolol succinate unchanged.      The patient should follow back with Dr. Perez for his diarrhea, since it does  appear to be affecting his weight and also his potassium.  He is encouraged to eat potassium-rich foods.      As the patient is very stable from a cardiovascular standpoint, he will return to see one of our advanced practice providers in 1 year and myself in 2 years.         CEM AYALA MD             D: 2018   T: 2018   MT: SAMANTHA      Name:     CONRADO WALDEN   MRN:      [id number]        Account:      [id number]   :      1934           Service Date: 2018      Document: [id number]         Outpatient Encounter Prescriptions as of 2018   Medication Sig Dispense Refill     albuterol (PROAIR HFA) 108 (90 Base) MCG/ACT inhaler Inhale 2 puffs into the lungs every 4 hours as needed for shortness of breath / dyspnea Rinse mouthpiece weekly. 1 Inhaler 0     amLODIPine (NORVASC) 5 MG tablet Take 1 tablet (5 mg) by mouth daily 90 tablet 3     atorvastatin (LIPITOR) 20 MG tablet Take 1 tablet (20 mg) by mouth daily 90 tablet 3     clopidogrel (PLAVIX) 75 MG tablet Take 1 tablet (75 mg) by mouth daily 90 tablet 3     fluticasone (FLONASE) 50 MCG/ACT spray Spray 1-2 sprays into both nostrils daily 16 g 0     lisinopril (PRINIVIL/ZESTRIL) 2.5 MG tablet Take 1 tablet (2.5 mg) by mouth daily 90 tablet 3     metoprolol succinate (TOPROL-XL) 25 MG 24 hr tablet Take 1 tablet (25 mg) by mouth daily 90 tablet 3     nitroglycerin (NITROSTAT) 0.4 MG SL tablet Place 1 tablet (0.4 mg) under the tongue every 5 minutes as needed for chest pain for chest pain 25 tablet 3     ranitidine (ZANTAC) 150 MG tablet Take 1 tablet (150 mg) by mouth 2 times daily as needed for heartburn 90 tablet 2     [DISCONTINUED] guaiFENesin (ORGANIDIN) 200 MG TABS tablet Take 2 tablets (400 mg) by mouth every 6 hours as needed for cough 60 tablet 0     No facility-administered encounter medications on file as of 2018.        Again, thank you for allowing me to participate in the care of your patient.       Sincerely,    Huang Toledo MD     [facility]

## 2018-11-29 NOTE — PROGRESS NOTES
Service Date: 11/28/2018      HISTORY OF PRESENT ILLNESS:  Mr. Melgar is a pleasant 84-year-old gentleman with a history of coronary artery disease with prior PCI in 2009 to the LAD and obtuse marginal, who previously was following Dr. Fisher, who returns in routine 6-month followup.      Unfortunately, since we have seen the patient last, he has been diagnosed with progressive and fairly advanced Alzheimer's disease.      From a cardiovascular standpoint, he has been stable, denying any chest pain, pressure, shortness of breath, orthopnea or paroxysmal nocturnal dyspnea.      His wife, who accompanies him at this visit, feels that he is doing well.  Their main concern at today's visit is that he has persistent diarrhea and weight loss.  They are planning to schedule an appointment with Dr. Perez to discuss this further.      The patient had blood work performed prior to our visit.  His lipids show an LDL down to 38.  His BMP is stable with a GFR of 54 and creatinine of 1.27, which is near baseline.  His potassium is somewhat low, likely due to the diarrhea.      Please see my separate note with his full physical examination.      IMPRESSION AND PLAN:  Mr. Melgar is a pleasant 84-year-old gentleman with stable coronary artery disease as detailed above.      From a cardiovascular standpoint, I will continue the patient's antiplatelet in the form of clopidogrel 75 mg daily unchanged.  He will remain on atorvastatin 20 mg daily unchanged for secondary prevention.      Regarding his high blood pressure which is currently well controlled, he will remain on amlodipine and metoprolol succinate unchanged.      The patient should follow back with Dr. Perez for his diarrhea, since it does appear to be affecting his weight and also his potassium.  He is encouraged to eat potassium-rich foods.      As the patient is very stable from a cardiovascular standpoint, he will return to see one of our advanced practice providers in 1  year and myself in 2 years.         CEM AYALA MD             D: 2018   T: 2018   MT: SAMANTHA      Name:     CONRADO WALDEN   MRN:      [id number]        Account:      [id number]   :      1934           Service Date: 2018      Document: [id number]

## 2018-12-03 NOTE — PATIENT INSTRUCTIONS
Turn in stool samples if with persistent watery stools.  Further treatment depending on results.    Schedule speech therapy appointment for swallow study to determine if he has aspiration of food.    Albuterol inhaler is used only for episodes of wheezing or shortness of breath.    Diarrhea with Uncertain Cause (Adult)    Diarrhea is when stools are loose and watery. This can be caused by:    Viral infections    Bacterial infections    Food poisoning    Parasites    Irritable bowel syndrome (IBS)    Inflammatory bowel diseases such as ulcerative colitis, Crohn's disease, and celiac disease    Food intolerance, such as to lactose, the sugar found in milk and milk products    Reaction to medicines like antibiotics, laxatives, cancer drugs, and antacids  Along with diarrhea, you may also have:    Abdominal pain and cramping    Nausea and vomiting    Loss of bowel control    Fever and chills    Bloody stools  In some cases, antibiotics may help to treat diarrhea. You may have a stool sample test. This is done to see what is causing your diarrhea, and if antibiotics will help treat it. The results of a stool sample test may take up to 2 days. The healthcare provider may not give you antibiotics until he or she has the stool test results.  Diarrhea can cause dehydration. This is the loss of too much water and other fluids from the body. When this occurs, body fluid must be replaced. This can be done with oral rehydration solutions. Oral rehydration solutions are available at drugstores and grocery stores without a prescription. Sports drinks are not the best choice if you are very dehydrated. They have too much sugar and not enough electrolytes.  Home care  Follow all instructions given by your healthcare provider. Rest at home for the next 24 hours, or until you feel better. Avoid caffeine, tobacco, and alcohol. These can make diarrhea, cramping, and pain worse.  If taking medicines:    Over-the-counter nausea and  diarrhea medicines are generally OK unless you experience fever or blood stool. Check with your doctor first in those circumstances.    You may use acetaminophen or NSAID medicines like ibuprofen or naproxen to reduce pain and fever. Don t use these if you have chronic liver or kidney disease, or ever had a stomach ulcer or gastrointestinal bleeding. Don't use NSAID medicines if you are already taking one for another condition (like arthritis) or are on daily aspirin therapy (such as for heart disease or after a stroke). Talk with your healthcare provider first.    If antibiotics were prescribed, be sure you take them until they are finished. Don t stop taking them even when you feel better. Antibiotics must be taken as a full course.  To prevent the spread of illness:    Remember that washing with soap and water and using alcohol-based  is the best way to prevent the spread of infection. Dry your hands with a single use towel (like a paper towel).    Clean the toilet after each use.    Wash your hands before eating.    Wash your hands before and after preparing food. Keep in mind that people with diarrhea or vomiting should not prepare food for others.    Wash your hands after using cutting boards, countertops, and knives that have been in contact with raw foods.    Wash and then peel fruits and vegetables.    Keep uncooked meats away from cooked and ready-to-eat foods.    Use a food thermometer when cooking. Cook poultry to at least 165 F (74 C). Cook ground meat (beef, veal, pork, lamb) to at least 160 F (71 C). Cook fresh beef, veal, lamb, and pork to at least 145 F (63 C).    Don t eat raw or undercooked eggs (poached or petey side up), poultry, meat, or unpasteurized milk and juices.  Food and drinks  The main goal while treating vomiting or diarrhea is to prevent dehydration. This is done by taking small amounts of liquids often.    Keep in mind that liquids are more important than food right  now.    Drink only small amounts of liquids at a time.    Don t force yourself to eat, especially if you are having cramping, vomiting, or diarrhea. Don t eat large amounts at a time, even if you are hungry.    If you eat, avoid fatty, greasy, spicy, or fried foods.    Don t eat dairy foods or drink milk if you have diarrhea. These can make diarrhea worse.  During the first 24 hours you can try:    Oral rehydration solutions.  Sports drinks may be used if you are not too dehydrated and are otherwise healthy.    Soft drinks without caffeine    Ginger ale    Water (plain or flavored)    Decaf tea or coffee    Clear broth, consommé, or bouillon    Gelatin, popsicles, or frozen fruit juice bars  The second 24 hours, if you are feeling better, you can add:    Hot cereal, plain toast, bread, rolls, or crackers    Plain noodles, rice, mashed potatoes, chicken noodle soup, or rice soup    Unsweetened canned fruit (no pineapple)    Bananas  As you recover:    Limit fat intake to less than 15 grams per day. Don t eat margarine, butter, oils, mayonnaise, sauces, gravies, fried foods, peanut butter, meat, poultry, or fish.    Limit fiber. Don t eat raw or cooked vegetables, fresh fruits except bananas, or bran cereals.    Limit caffeine and chocolate.    Limit dairy.    Don t use spices or seasonings except salt.    Go back to your normal diet over time, as you feel better and your symptoms improve.    If the symptoms come back, go back to a simple diet or clear liquids.  Follow-up care  Follow up with your healthcare provider, or as advised. If a stool sample was taken or cultures were done, call the healthcare provider for the results as instructed.  Call 911  Call 911 if you have any of these symptoms:    Trouble breathing    Confusion    Extreme drowsiness or trouble walking    Loss of consciousness    Rapid heart rate    Chest pain    Stiff neck    Seizure   When to seek medical advice  Call your healthcare provider right  away if any of these occur:    Abdominal pain that gets worse    Constant lower right abdominal pain    Continued vomiting and inability to keep liquids down    Diarrhea more than 5 times a day    Blood in vomit or stool    Dark urine or no urine for 8 hours, dry mouth and tongue, tiredness, weakness, or dizziness    Drowsiness    New rash    You don t get better in 2 to 3 days    Fever of 100.4 F (38 C) or higher, or as directed by your healthcare provider  Date Last Reviewed: 6/1/2018 2000-2018 [employer]. [address] [place] [id number]. All rights reserved. This information is not intended as a substitute for professional medical care. Always follow your healthcare professional's instructions.        Dysphagia (Adult)    Dysphagia is trouble swallowing. If you have dysphagia, you may have symptoms that include:    Choking or coughing when you eat or drink    Food getting stuck    Drooling    Inability to swallow    Pain behind the breastbone after swallowing    Vomiting after you eat or drink    Aspirating (inhaling into the lungs) foods or liquids when you swallow  The main causes of dysphagia are problems that affect the mouth, throat or tongue. Dysphagia may be caused by a problem with the esophagus (tube that carries food from the mouth to the stomach). These include blockage, swelling, or irritation from acid reflux or injury. An infection of the esophagus or an allergic reaction in the esophagus can also cause dysphagia. Problems in the brain, such as a stroke or Parkinson's disease, can affect the muscles that coordinate swallowing.  Dysphagia is treated by treating the cause. Your healthcare provider may evaluate you using X-ray, special esophagus monitors, a fiber optic evaluation of swallowing, or an upper endoscopy, which uses a thin, lighted tube sent through the mouth to the esophagus. You may be given medicine to reduce stomach acid or control muscle spasms. If the  problem doesn't go away, a procedure can be done to widen (dilate) the esophagus. If you have muscle or nerve problems, you may be advised to see a speech or occupational therapist. He or she may give you exercises and instructions to help make eating safer. If you have an infection or allergic condition, your healthcare provide will prescribe medicine for it.  Home care  To help ease the symptoms of dysphagia:    Take any medicine you ve been given exactly as directed. Ask for liquid medicines if you need them.    To make eating easier:  ? Eat slowly.   ? Eat in a relaxed setting.  ? Don t talk while you eat.  ? Take small bites. Chew slowly and completely before you swallow.  ? Sit upright during and after meals. Chewing food releases enzymes in your mouth that start the digestive process. Chew soft foods at least 5 to10 times. Chew more dense food (meats and vegetables) up to 30 times before swallowing. Count the number of times you chew until you get a sense of how soft the food needs to be before swallowing.  ? Don't eat dry bread products or meat fibers.  ? Puree solid foods if needed. Thicken liquids with milk, juice, broth, gravy, or starch to make them easier to swallow.  ? Ask your healthcare provider if a liquid diet may be better for you.  Follow-up care  Follow up with your healthcare provider or as directed. Your healthcare provider can give you information about tests you may need.   When to seek medical advice  Call your healthcare provider right away for any of the following:    Inability to keep down food or liquid    Symptoms that get worse quickly    Coughing that won't stop    Continuing to lose weight    Fever of 100.4 F (38 C) or higher, or as directed by your healthcare provider    Other symptoms as indicated by your healthcare provider  Call 911  Call 911 for any of the following:    Trouble breathing    Inability to talk    Drooling, inability to control secretions    Loss of  consciousness  Date Last Reviewed: 3/1/2018    1573-1111 [employer]. [address] [place] [address], [place] [address]. All rights reserved. This information is not intended as a substitute for professional medical care. Always follow your healthcare professional's instructions.        Earwax Removal    The ear canal makes earwax from the canal s lining. The ears make wax to lubricate and protect the ear canal. The ear canal is the tube that connects the middle ear to the outside of the ear. The wax protects the ear from bacteria, infection, and damage from water or trauma.  The wax that forms in the canal naturally moves toward the outside of the ear and falls out. In some cases, the ear may make too much wax. If the wax causes problems or keeps the healthcare provider from seeing into the ear, the extra wax may be removed.  Too much wax can affect your hearing. It can cause itching. In rare cases, it can be painful. Earwax should not be removed unless it is causing a problem. You should not stick objects into your ear to remove wax unless told to do so by your healthcare provider.  Healthcare providers can remove earwax safely. It is important to stay still during the procedure to avoid damage to the ear canal. But removing earwax generally doesn t hurt. You will not usually need anesthesia or pain medicine when the provider removes the earwax.  A number of conditions lead to earwax buildup. These include some skin problems, a narrow ear canal, or ears that make too much earwax. Using cotton swabs in the canal pushes earwax deeper into the ear and contributes to the buildup of earwax.  Home care    The healthcare provider may recommend mineral oil or an over-the-counter eardrop to use at home to soften the earwax. Use these products only if the provider recommends them. Carefully follow the instructions given.    Don t use mineral oil or OTC eardrops if you might have an ear infection or a ruptured eardrum.  Tell your healthcare provider right away if you have diabetes or an immune disorder.    Don t use cotton swabs in your ears. Cotton swabs may push wax deeper into the ear canal or damage the eardrum. Use cotton gauze or a wet washcloth  to gently remove wax on the outside of the ear and around the opening to the ear canal.    Don't use any probing device or object such as cotton-tipped swabs or donna pins to clean the inside of your ears.    Don t use ear candles to clean your ears. Candling can be dangerous. It can burn the ear canal. It can also make the condition worse instead of better.    Don t use cold water to rinse the ear. This will make you dizzy. If your provider tells you to rinse your ear, use only warm water or follow his or her instructions.    Check the ear for signs of infection or irritation listed below under When to seek medical advice.  Steps for using eardrops  1. Warm the medicine bottle by rubbing it between your hands for a few minutes.  2. Lie down on your side, with the affected ear up.  3. Place the recommended number of drops in the ear. Wet a cotton ball with the medicine. Gently put the cotton ball into the ear opening.  Follow-up care  Follow up with your healthcare provider, or as directed.  When to seek medical advice  Call the provider right away if you have:    Ear pain that gets worse    Fever of 100.4F F (38 C) or higher, or as directed by your healthcare provider    Worsening wax buildup    Severe pain, dizziness, or nausea    Bleeding from the ear    Hearing problems    Signs of irritation from the eardrops, such as burning, stinging, or swelling and tenderness    Foul-smelling fluid draining from the ear    Swelling, redness, or tenderness of the outer ear    Headache, neck pain, or stiff neck  Date Last Reviewed: 6/1/2017 2000-2018 [employer]. [address] [place] [address], [place] [address]. All rights reserved. This information is not intended as a substitute  for professional medical care. Always follow your healthcare professional's instructions.

## 2018-12-03 NOTE — MR AVS SNAPSHOT
After Visit Summary   12/3/2018    Evin Melgar    MRN: [id number]           Patient Information     Date Of Birth          6/1/1934        Visit Information        Provider Department      12/3/2018 8:40 AM Brian Perez MD [facility]        Today's Diagnoses     Diarrhea, unspecified type    -  1    Coughing        Late onset Alzheimer's disease with behavioral disturbance        Excessive cerumen in left ear canal        Mild intermittent asthma without complication        Need for prophylactic vaccination and inoculation against influenza          Care Instructions    Turn in stool samples if with persistent watery stools.  Further treatment depending on results.    Schedule speech therapy appointment for swallow study to determine if he has aspiration of food.    Albuterol inhaler is used only for episodes of wheezing or shortness of breath.    Diarrhea with Uncertain Cause (Adult)    Diarrhea is when stools are loose and watery. This can be caused by:    Viral infections    Bacterial infections    Food poisoning    Parasites    Irritable bowel syndrome (IBS)    Inflammatory bowel diseases such as ulcerative colitis, Crohn's disease, and celiac disease    Food intolerance, such as to lactose, the sugar found in milk and milk products    Reaction to medicines like antibiotics, laxatives, cancer drugs, and antacids  Along with diarrhea, you may also have:    Abdominal pain and cramping    Nausea and vomiting    Loss of bowel control    Fever and chills    Bloody stools  In some cases, antibiotics may help to treat diarrhea. You may have a stool sample test. This is done to see what is causing your diarrhea, and if antibiotics will help treat it. The results of a stool sample test may take up to 2 days. The healthcare provider may not give you antibiotics until he or she has the stool test results.  Diarrhea can cause dehydration. This is the loss of too much water and  other fluids from the body. When this occurs, body fluid must be replaced. This can be done with oral rehydration solutions. Oral rehydration solutions are available at drugstores and grocery stores without a prescription. Sports drinks are not the best choice if you are very dehydrated. They have too much sugar and not enough electrolytes.  Home care  Follow all instructions given by your healthcare provider. Rest at home for the next 24 hours, or until you feel better. Avoid caffeine, tobacco, and alcohol. These can make diarrhea, cramping, and pain worse.  If taking medicines:    Over-the-counter nausea and diarrhea medicines are generally OK unless you experience fever or blood stool. Check with your doctor first in those circumstances.    You may use acetaminophen or NSAID medicines like ibuprofen or naproxen to reduce pain and fever. Don t use these if you have chronic liver or kidney disease, or ever had a stomach ulcer or gastrointestinal bleeding. Don't use NSAID medicines if you are already taking one for another condition (like arthritis) or are on daily aspirin therapy (such as for heart disease or after a stroke). Talk with your healthcare provider first.    If antibiotics were prescribed, be sure you take them until they are finished. Don t stop taking them even when you feel better. Antibiotics must be taken as a full course.  To prevent the spread of illness:    Remember that washing with soap and water and using alcohol-based  is the best way to prevent the spread of infection. Dry your hands with a single use towel (like a paper towel).    Clean the toilet after each use.    Wash your hands before eating.    Wash your hands before and after preparing food. Keep in mind that people with diarrhea or vomiting should not prepare food for others.    Wash your hands after using cutting boards, countertops, and knives that have been in contact with raw foods.    Wash and then peel fruits and  vegetables.    Keep uncooked meats away from cooked and ready-to-eat foods.    Use a food thermometer when cooking. Cook poultry to at least 165 F (74 C). Cook ground meat (beef, veal, pork, lamb) to at least 160 F (71 C). Cook fresh beef, veal, lamb, and pork to at least 145 F (63 C).    Don t eat raw or undercooked eggs (poached or petey side up), poultry, meat, or unpasteurized milk and juices.  Food and drinks  The main goal while treating vomiting or diarrhea is to prevent dehydration. This is done by taking small amounts of liquids often.    Keep in mind that liquids are more important than food right now.    Drink only small amounts of liquids at a time.    Don t force yourself to eat, especially if you are having cramping, vomiting, or diarrhea. Don t eat large amounts at a time, even if you are hungry.    If you eat, avoid fatty, greasy, spicy, or fried foods.    Don t eat dairy foods or drink milk if you have diarrhea. These can make diarrhea worse.  During the first 24 hours you can try:    Oral rehydration solutions.  Sports drinks may be used if you are not too dehydrated and are otherwise healthy.    Soft drinks without caffeine    Ginger ale    Water (plain or flavored)    Decaf tea or coffee    Clear broth, consommé, or bouillon    Gelatin, popsicles, or frozen fruit juice bars  The second 24 hours, if you are feeling better, you can add:    Hot cereal, plain toast, bread, rolls, or crackers    Plain noodles, rice, mashed potatoes, chicken noodle soup, or rice soup    Unsweetened canned fruit (no pineapple)    Bananas  As you recover:    Limit fat intake to less than 15 grams per day. Don t eat margarine, butter, oils, mayonnaise, sauces, gravies, fried foods, peanut butter, meat, poultry, or fish.    Limit fiber. Don t eat raw or cooked vegetables, fresh fruits except bananas, or bran cereals.    Limit caffeine and chocolate.    Limit dairy.    Don t use spices or seasonings except salt.    Go back  to your normal diet over time, as you feel better and your symptoms improve.    If the symptoms come back, go back to a simple diet or clear liquids.  Follow-up care  Follow up with your healthcare provider, or as advised. If a stool sample was taken or cultures were done, call the healthcare provider for the results as instructed.  Call 911  Call 911 if you have any of these symptoms:    Trouble breathing    Confusion    Extreme drowsiness or trouble walking    Loss of consciousness    Rapid heart rate    Chest pain    Stiff neck    Seizure   When to seek medical advice  Call your healthcare provider right away if any of these occur:    Abdominal pain that gets worse    Constant lower right abdominal pain    Continued vomiting and inability to keep liquids down    Diarrhea more than 5 times a day    Blood in vomit or stool    Dark urine or no urine for 8 hours, dry mouth and tongue, tiredness, weakness, or dizziness    Drowsiness    New rash    You don t get better in 2 to 3 days    Fever of 100.4 F (38 C) or higher, or as directed by your healthcare provider  Date Last Reviewed: 6/1/2018 2000-2018 [email or web address]. [address] [place] [address]. All rights reserved. This information is not intended as a substitute for professional medical care. Always follow your healthcare professional's instructions.        Dysphagia (Adult)    Dysphagia is trouble swallowing. If you have dysphagia, you may have symptoms that include:    Choking or coughing when you eat or drink    Food getting stuck    Drooling    Inability to swallow    Pain behind the breastbone after swallowing    Vomiting after you eat or drink    Aspirating (inhaling into the lungs) foods or liquids when you swallow  The main causes of dysphagia are problems that affect the mouth, throat or tongue. Dysphagia may be caused by a problem with the esophagus (tube that carries food from the mouth to the stomach). These include blockage,  swelling, or irritation from acid reflux or injury. An infection of the esophagus or an allergic reaction in the esophagus can also cause dysphagia. Problems in the brain, such as a stroke or Parkinson's disease, can affect the muscles that coordinate swallowing.  Dysphagia is treated by treating the cause. Your healthcare provider may evaluate you using X-ray, special esophagus monitors, a fiber optic evaluation of swallowing, or an upper endoscopy, which uses a thin, lighted tube sent through the mouth to the esophagus. You may be given medicine to reduce stomach acid or control muscle spasms. If the problem doesn't go away, a procedure can be done to widen (dilate) the esophagus. If you have muscle or nerve problems, you may be advised to see a speech or occupational therapist. He or she may give you exercises and instructions to help make eating safer. If you have an infection or allergic condition, your healthcare provide will prescribe medicine for it.  Home care  To help ease the symptoms of dysphagia:    Take any medicine you ve been given exactly as directed. Ask for liquid medicines if you need them.    To make eating easier:  ? Eat slowly.   ? Eat in a relaxed setting.  ? Don t talk while you eat.  ? Take small bites. Chew slowly and completely before you swallow.  ? Sit upright during and after meals. Chewing food releases enzymes in your mouth that start the digestive process. Chew soft foods at least 5 to10 times. Chew more dense food (meats and vegetables) up to 30 times before swallowing. Count the number of times you chew until you get a sense of how soft the food needs to be before swallowing.  ? Don't eat dry bread products or meat fibers.  ? Puree solid foods if needed. Thicken liquids with milk, juice, broth, gravy, or starch to make them easier to swallow.  ? Ask your healthcare provider if a liquid diet may be better for you.  Follow-up care  Follow up with your healthcare provider or as  directed. Your healthcare provider can give you information about tests you may need.   When to seek medical advice  Call your healthcare provider right away for any of the following:    Inability to keep down food or liquid    Symptoms that get worse quickly    Coughing that won't stop    Continuing to lose weight    Fever of 100.4 F (38 C) or higher, or as directed by your healthcare provider    Other symptoms as indicated by your healthcare provider  Call 911  Call 911 for any of the following:    Trouble breathing    Inability to talk    Drooling, inability to control secretions    Loss of consciousness  Date Last Reviewed: 3/1/2018    [id number] [employer]. [address] [place], [place] [id number]. All rights reserved. This information is not intended as a substitute for professional medical care. Always follow your healthcare professional's instructions.        Earwax Removal    The ear canal makes earwax from the canal s lining. The ears make wax to lubricate and protect the ear canal. The ear canal is the tube that connects the middle ear to the outside of the ear. The wax protects the ear from bacteria, infection, and damage from water or trauma.  The wax that forms in the canal naturally moves toward the outside of the ear and falls out. In some cases, the ear may make too much wax. If the wax causes problems or keeps the healthcare provider from seeing into the ear, the extra wax may be removed.  Too much wax can affect your hearing. It can cause itching. In rare cases, it can be painful. Earwax should not be removed unless it is causing a problem. You should not stick objects into your ear to remove wax unless told to do so by your healthcare provider.  Healthcare providers can remove earwax safely. It is important to stay still during the procedure to avoid damage to the ear canal. But removing earwax generally doesn t hurt. You will not usually need anesthesia or pain medicine when the  provider removes the earwax.  A number of conditions lead to earwax buildup. These include some skin problems, a narrow ear canal, or ears that make too much earwax. Using cotton swabs in the canal pushes earwax deeper into the ear and contributes to the buildup of earwax.  Home care    The healthcare provider may recommend mineral oil or an over-the-counter eardrop to use at home to soften the earwax. Use these products only if the provider recommends them. Carefully follow the instructions given.    Don t use mineral oil or OTC eardrops if you might have an ear infection or a ruptured eardrum. Tell your healthcare provider right away if you have diabetes or an immune disorder.    Don t use cotton swabs in your ears. Cotton swabs may push wax deeper into the ear canal or damage the eardrum. Use cotton gauze or a wet washcloth  to gently remove wax on the outside of the ear and around the opening to the ear canal.    Don't use any probing device or object such as cotton-tipped swabs or donna pins to clean the inside of your ears.    Don t use ear candles to clean your ears. Candling can be dangerous. It can burn the ear canal. It can also make the condition worse instead of better.    Don t use cold water to rinse the ear. This will make you dizzy. If your provider tells you to rinse your ear, use only warm water or follow his or her instructions.    Check the ear for signs of infection or irritation listed below under When to seek medical advice.  Steps for using eardrops  1. Warm the medicine bottle by rubbing it between your hands for a few minutes.  2. Lie down on your side, with the affected ear up.  3. Place the recommended number of drops in the ear. Wet a cotton ball with the medicine. Gently put the cotton ball into the ear opening.  Follow-up care  Follow up with your healthcare provider, or as directed.  When to seek medical advice  Call the provider right away if you have:    Ear pain that gets  worse    Fever of 100.4F F (38 C) or higher, or as directed by your healthcare provider    Worsening wax buildup    Severe pain, dizziness, or nausea    Bleeding from the ear    Hearing problems    Signs of irritation from the eardrops, such as burning, stinging, or swelling and tenderness    Foul-smelling fluid draining from the ear    Swelling, redness, or tenderness of the outer ear    Headache, neck pain, or stiff neck  Date Last Reviewed: 6/1/2017 2000-2018 [employer]. [address] [place] [address]. All rights reserved. This information is not intended as a substitute for professional medical care. Always follow your healthcare professional's instructions.                Follow-ups after your visit        Additional Services     SPEECH THERAPY REFERRAL       If you have not heard from the scheduling office within 2 business days, please call [phone] for all locations, with the exception of [place], please call [phone] and [facility], please call [phone].    Please be aware that coverage of these services is subject to the terms and limitations of your health insurance plan.  Call member services at your health plan with any benefit or coverage questions.                  Future tests that were ordered for you today     Open Future Orders        Priority Expected Expires Ordered    Ova and Parasite Exam Routine Routine  12/3/2019 12/3/2018    SPEECH THERAPY REFERRAL Routine  12/3/2019 12/3/2018    Clostridium difficile Toxin B PCR Routine  1/2/2019 12/3/2018    Enteric Bacteria and Virus Panel by NANCY Stool Routine  12/3/2019 12/3/2018    Ova and Parasite Exam Routine Routine  12/3/2019 12/3/2018            Who to contact     If you have questions or need follow up information about today's clinic visit or your schedule please contact [facility] directly at [phone].  Normal or non-critical lab and imaging results will be communicated to you by  "MyChart, letter or phone within 4 business days after the clinic has received the results. If you do not hear from us within 7 days, please contact the clinic through [employer] or phone. If you have a critical or abnormal lab result, we will notify you by phone as soon as possible.  Submit refill requests through [employer] or call your pharmacy and they will forward the refill request to us. Please allow 3 business days for your refill to be completed.          Additional Information About Your Visit        [employer] Information     [employer] gives you secure access to your electronic health record. If you see a primary care provider, you can also send messages to your care team and make appointments. If you have questions, please call your primary care clinic.  If you do not have a primary care provider, please call [phone] and they will assist you.        Care EveryWhere ID     This is your Care EveryWhere ID. This could be used by other organizations to access your [place] medical records  [id number]        Your Vitals Were     Pulse Temperature Respirations Height Pulse Oximetry BMI (Body Mass Index)    71 97.4  F (36.3  C) (Tympanic) 16 5' 3.5\" (1.613 m) 94% 18.01 kg/m2       Blood Pressure from Last 3 Encounters:   12/03/18 123/64   11/28/18 118/66   11/14/18 133/60    Weight from Last 3 Encounters:   12/03/18 103 lb 5 oz (46.9 kg)   11/28/18 105 lb (47.6 kg)   11/14/18 108 lb 9.6 oz (49.3 kg)              We Performed the Following     FLU VACCINE, INCREASED ANTIGEN, PRESV FREE, AGE 65+ [60028]     Vaccine Administration, Initial [64001]        Primary Care Provider Office Phone # Fax #    Brian Perez [phone] [address] [facility] [address]        Equal Access to Services     LEANDER MENJIVAR : Laura Daniels, waaxda luqadaha, qaybta kaalmada juan, westley lowery. So [facility] [phone].    ATENCIÓN: Si habla " español, tiene a vargas disposición servicios gratuitos de asistencia lingüística. Angela quintana [phone].    We comply with applicable federal civil rights laws and [place] laws. We do not discriminate on the basis of race, color, national origin, age, disability, sex, sexual orientation, or gender identity.            Thank you!     Thank you for choosing [facility]  for your care. Our goal is always to provide you with excellent care. Hearing back from our patients is one way we can continue to improve our services. Please take a few minutes to complete the written survey that you may receive in the mail after your visit with us. Thank you!             Your Updated Medication List - Protect others around you: Learn how to safely use, store and throw away your medicines at www.disposemymeds.org.          This list is accurate as of 12/3/18  9:36 AM.  Always use your most recent med list.                   Brand Name Dispense Instructions for use Diagnosis    albuterol 108 (90 Base) MCG/ACT inhaler    PROAIR HFA    1 Inhaler    Inhale 2 puffs into the lungs every 4 hours as needed for shortness of breath / dyspnea Rinse mouthpiece weekly.    Moderate persistent asthma without complication       amLODIPine 5 MG tablet    NORVASC    90 tablet    Take 1 tablet (5 mg) by mouth daily    Essential hypertension with goal blood pressure less than 140/90       atorvastatin 20 MG tablet    LIPITOR    90 tablet    Take 1 tablet (20 mg) by mouth daily    Hyperlipidemia LDL goal <100       clopidogrel 75 MG tablet    PLAVIX    90 tablet    Take 1 tablet (75 mg) by mouth daily    Coronary artery disease involving native coronary artery, angina presence unspecified, unspecified whether native or transplanted heart       fluticasone 50 MCG/ACT nasal spray    FLONASE    16 g    Spray 1-2 sprays into both nostrils daily    Postnasal drip       lisinopril 2.5 MG tablet    PRINIVIL/Zestril    90 tablet    Take 1 tablet (2.5  mg) by mouth daily    Essential hypertension with goal blood pressure less than 140/90       metoprolol succinate ER 25 MG 24 hr tablet    TOPROL-XL    90 tablet    Take 1 tablet (25 mg) by mouth daily    Syncope and collapse       nitroGLYcerin 0.4 MG sublingual tablet    NITROSTAT    25 tablet    Place 1 tablet (0.4 mg) under the tongue every 5 minutes as needed for chest pain for chest pain    CAD (coronary artery disease)       ranitidine 150 MG tablet    ZANTAC    90 tablet    Take 1 tablet (150 mg) by mouth 2 times daily as needed for heartburn    Gastroesophageal reflux disease without esophagitis

## 2018-12-03 NOTE — PROGRESS NOTES
SUBJECTIVE:   Evin Melgar is a 84 year old male who presents to clinic today for the following health issues:    Patient is here with spouse.  Patient has hx of progressive dementia. Patient is a poor historian.    Asthma Follow-Up    Was ACT completed today?    Yes    ACT Total Scores 12/3/2018   ACT TOTAL SCORE -   ASTHMA ER VISITS -   ASTHMA HOSPITALIZATIONS -   ACT TOTAL SCORE (Goal Greater than or Equal to 20) 10   In the past 12 months, how many times did you visit the emergency room for your asthma without being admitted to the hospital? 0   In the past 12 months, how many times were you hospitalized overnight because of your asthma? 0       Recent asthma triggers that patient is dealing with: cold air and patient has been coughing after eating a lot lately        Amount of exercise or physical activity: None    Problems taking medications regularly: No    Medication side effects: none    Diet: because of diarrhea in past two weeks, patient's appetite has been down.  Wife states patient is not eating.      Diarrhea      Duration: two weeks    Description:       Consistency of stool: watery, runny, loose and explosive       Blood in stool: no        Number of loose stools past 24 hours: 3-5  Patient gives changing history if he had BM today or not. He also gives conflicting accounts if he had watery stools or formed stools in last few days.  Spouse states she has not seen stools in the last few days.\    Intensity:  moderate    Accompanying signs and symptoms:       Fever: no        Nausea/vomitting: no        Abdominal pain: no        Weight loss: YES    History (recent antibiotics or travel/ill contacts/med changes/testing done): 2-5 pounds    Precipitating or alleviating factors: None    Therapies tried and outcome: Imodium AD    Spouse states Evin appears to have lost weight - not eating much since having diarrhea.  Patient states he does not like the food she prepares - later in the conversation,  "he said he likes her cooking.    Spouse further states that patient has been \"coughing after eating\".  Patient denies he feels choking while eating.  Spouse denies seeing patient having emesis or acting like he chokes, but does observe the coughing.  Spouse denies patient having swallow study.    Spouse states ears should be checked as she has noticed she has to keep repeating what she tells patient.  Patient denies ear symptoms.    Problem list and histories reviewed & adjusted, as indicated.  Additional history: as documented    Patient Active Problem List   Diagnosis     Hypertension     Mild persistent asthma without complication     CAD (coronary artery disease)     Tubular adenoma     CKD (chronic kidney disease), stage III (H)     GERD (gastroesophageal reflux disease)     Hyperlipidemia LDL goal <100     Advanced directives, counseling/discussion     OAB (overactive bladder)     Microscopic hematuria     Memory deficit     Health Care Home     COPD, mild (H)     Pain of left upper arm     Nocturia     POLST (Physician Orders for Life-Sustaining Treatment)     Anxiety disorder due to medical condition     Alzheimer's disease     Past Surgical History:   Procedure Laterality Date     ANGIOGRAM  10/07    coronary=mild, diffuse CAD; stent below patent     ANGIOPLASTY  9/07    stent to ostia of LAD     SURGICAL HISTORY OF -       Appendectomy       Social History   Substance Use Topics     Smoking status: Former Smoker     Quit date: 1/1/1949     Smokeless tobacco: Never Used     Alcohol use Yes      Comment: occasionally     Family History   Problem Relation Age of Onset     Cerebrovascular Disease Mother      Lipids Mother      Diabetes Father      Cerebrovascular Disease Father      Diabetes Brother      Heart Disease Brother          Current Outpatient Prescriptions   Medication Sig Dispense Refill     albuterol (PROAIR HFA) 108 (90 Base) MCG/ACT inhaler Inhale 2 puffs into the lungs every 4 hours as needed " "for shortness of breath / dyspnea Rinse mouthpiece weekly. 1 Inhaler 0     amLODIPine (NORVASC) 5 MG tablet Take 1 tablet (5 mg) by mouth daily 90 tablet 3     atorvastatin (LIPITOR) 20 MG tablet Take 1 tablet (20 mg) by mouth daily 90 tablet 3     clopidogrel (PLAVIX) 75 MG tablet Take 1 tablet (75 mg) by mouth daily 90 tablet 3     fluticasone (FLONASE) 50 MCG/ACT spray Spray 1-2 sprays into both nostrils daily 16 g 0     lisinopril (PRINIVIL/ZESTRIL) 2.5 MG tablet Take 1 tablet (2.5 mg) by mouth daily 90 tablet 3     metoprolol succinate (TOPROL-XL) 25 MG 24 hr tablet Take 1 tablet (25 mg) by mouth daily 90 tablet 3     ranitidine (ZANTAC) 150 MG tablet Take 1 tablet (150 mg) by mouth 2 times daily as needed for heartburn 90 tablet 2     nitroglycerin (NITROSTAT) 0.4 MG SL tablet Place 1 tablet (0.4 mg) under the tongue every 5 minutes as needed for chest pain for chest pain 25 tablet 3     No Known Allergies    Reviewed and updated as needed this visit by clinical staff  Tobacco  Allergies  Meds  Problems  Med Hx  Surg Hx  Fam Hx  Soc Hx        Reviewed and updated as needed this visit by Provider  Allergies  Meds  Problems         ROS:  CONSTITUTIONAL: NEGATIVE for fever, chills, change in weight  INTEGUMENTARY/SKIN: NEGATIVE for worrisome rashes, moles or lesions  EYES: NEGATIVE for vision changes or irritation  ENT/MOUTH: NEGATIVE for ear, mouth and throat problems  RESP: NEGATIVE for significant cough or SOB  CV: NEGATIVE for chest pain, palpitations or peripheral edema  GI: see above  : NEGATIVE for frequency, dysuria, or hematuria  MUSCULOSKELETAL: NEGATIVE for significant arthralgias or myalgia  NEURO: NEGATIVE for weakness, dizziness or paresthesias  PSYCHIATRIC: spouse state behavior has been at baseline    OBJECTIVE:                                                    /64  Pulse 71  Temp 97.4  F (36.3  C) (Tympanic)  Resp 16  Ht 5' 3.5\" (1.613 m)  Wt 103 lb 5 oz (46.9 kg)  SpO2 " 94%  BMI 18.01 kg/m2  Body mass index is 18.01 kg/(m^2).  GENERAL: underweight, alert and no distress, pleasantly demented  LEFT EAR: EAM moderate amt of cerumen obstructing canal, tympanic membrane visible intact and non-erythematous after evacuation of cerumen, no effusion  RIGHT EAR: EAM clear, tympanic membrane intact and non-erythematous, no effusion  NECK: no tenderness, no adenopathy  RESP: lungs clear to auscultation - no rales, no rhonchi, no wheezes  CV: regular rates and rhythm, normal S1 S2, no S3 or S4 and no murmur, no click or rub  ABD: flat abdomen, no skin changes, no TTP, no mass, no guarding, no Wakefield's sign  MS: extremities- no gross deformities noted, no edema  SKIN: no jaundice or rash    Diagnostic test results:  Diagnostic Test Results:  none      ASSESSMENT/PLAN:                                                        ICD-10-CM    1. Diarrhea, unspecified type R19.7 Clostridium difficile Toxin B PCR     Enteric Bacteria and Virus Panel by [facility] Stool     Ova and Parasite Exam Routine     Ova and Parasite Exam Routine  Patient is a poor historian, and spouse has not witnessed liquid stools in last few days.  Question if patient now still has diarrhea or not.  Discussed stool collection with them and spouse concurred to do so for testing.  Push oral fluids.  Diet as tolerated.  Treat infection if present.  Return precautions discussed and given to patient's spouse     2. Coughing R05 SPEECH THERAPY REFERRAL  Vague history. With his hx of dementia and his age, should rule out aspiration as such can risk patient for aspiration pneumonia or choking.     3. Late onset Alzheimer's disease with behavioral disturbance G30.1 SPEECH THERAPY REFERRAL    F02.81 Per spouse, stable behavior to date but progressive.  See above.  Continue neurology follow up.     4. Excessive cerumen in left ear canal H61.22 Discussed with spouse and patient finding on exam. Discussed removal via direct visualization and  using plastic ear curette.  After patient verbally consented to remove cerumen from left ear, EAM exposed by gently pulling lobe posteriorly.  Directly visualized cerumen in EAM on either ear.  Successfully removed cerumen on either ear with plastic ear curette. Tympanic membranes visualized intact.  Patient tolerated procedure well.     5. Mild intermittent asthma without complication J45.20 No exacaerbation today.  Apparently, patient uses albuterol just randomly throughout the day even with no wheezing/dyspnea.  Advised patient and spouse use of albuterol.  ACT not accurate due to patient answers are not reliable.     6. Need for prophylactic vaccination and inoculation against influenza Z23 FLU VACCINE, INCREASED ANTIGEN, PRESV FREE, AGE 65+ [47186]     Vaccine Administration, Initial [04054]       Follow up with Provider - 2 weeks or prn   Patient Instructions     Turn in stool samples if with persistent watery stools.  Further treatment depending on results.    Schedule speech therapy appointment for swallow study to determine if he has aspiration of food.    Albuterol inhaler is used only for episodes of wheezing or shortness of breath.    Diarrhea with Uncertain Cause (Adult)    Diarrhea is when stools are loose and watery. This can be caused by:    Viral infections    Bacterial infections    Food poisoning    Parasites    Irritable bowel syndrome (IBS)    Inflammatory bowel diseases such as ulcerative colitis, Crohn's disease, and celiac disease    Food intolerance, such as to lactose, the sugar found in milk and milk products    Reaction to medicines like antibiotics, laxatives, cancer drugs, and antacids  Along with diarrhea, you may also have:    Abdominal pain and cramping    Nausea and vomiting    Loss of bowel control    Fever and chills    Bloody stools  In some cases, antibiotics may help to treat diarrhea. You may have a stool sample test. This is done to see what is causing your diarrhea, and if  antibiotics will help treat it. The results of a stool sample test may take up to 2 days. The healthcare provider may not give you antibiotics until he or she has the stool test results.  Diarrhea can cause dehydration. This is the loss of too much water and other fluids from the body. When this occurs, body fluid must be replaced. This can be done with oral rehydration solutions. Oral rehydration solutions are available at drugstores and grocery stores without a prescription. Sports drinks are not the best choice if you are very dehydrated. They have too much sugar and not enough electrolytes.  Home care  Follow all instructions given by your healthcare provider. Rest at home for the next 24 hours, or until you feel better. Avoid caffeine, tobacco, and alcohol. These can make diarrhea, cramping, and pain worse.  If taking medicines:    Over-the-counter nausea and diarrhea medicines are generally OK unless you experience fever or blood stool. Check with your doctor first in those circumstances.    You may use acetaminophen or NSAID medicines like ibuprofen or naproxen to reduce pain and fever. Don t use these if you have chronic liver or kidney disease, or ever had a stomach ulcer or gastrointestinal bleeding. Don't use NSAID medicines if you are already taking one for another condition (like arthritis) or are on daily aspirin therapy (such as for heart disease or after a stroke). Talk with your healthcare provider first.    If antibiotics were prescribed, be sure you take them until they are finished. Don t stop taking them even when you feel better. Antibiotics must be taken as a full course.  To prevent the spread of illness:    Remember that washing with soap and water and using alcohol-based  is the best way to prevent the spread of infection. Dry your hands with a single use towel (like a paper towel).    Clean the toilet after each use.    Wash your hands before eating.    Wash your hands before and  after preparing food. Keep in mind that people with diarrhea or vomiting should not prepare food for others.    Wash your hands after using cutting boards, countertops, and knives that have been in contact with raw foods.    Wash and then peel fruits and vegetables.    Keep uncooked meats away from cooked and ready-to-eat foods.    Use a food thermometer when cooking. Cook poultry to at least 165 F (74 C). Cook ground meat (beef, veal, pork, lamb) to at least 160 F (71 C). Cook fresh beef, veal, lamb, and pork to at least 145 F (63 C).    Don t eat raw or undercooked eggs (poached or petey side up), poultry, meat, or unpasteurized milk and juices.  Food and drinks  The main goal while treating vomiting or diarrhea is to prevent dehydration. This is done by taking small amounts of liquids often.    Keep in mind that liquids are more important than food right now.    Drink only small amounts of liquids at a time.    Don t force yourself to eat, especially if you are having cramping, vomiting, or diarrhea. Don t eat large amounts at a time, even if you are hungry.    If you eat, avoid fatty, greasy, spicy, or fried foods.    Don t eat dairy foods or drink milk if you have diarrhea. These can make diarrhea worse.  During the first 24 hours you can try:    Oral rehydration solutions.  Sports drinks may be used if you are not too dehydrated and are otherwise healthy.    Soft drinks without caffeine    Ginger ale    Water (plain or flavored)    Decaf tea or coffee    Clear broth, consommé, or bouillon    Gelatin, popsicles, or frozen fruit juice bars  The second 24 hours, if you are feeling better, you can add:    Hot cereal, plain toast, bread, rolls, or crackers    Plain noodles, rice, mashed potatoes, chicken noodle soup, or rice soup    Unsweetened canned fruit (no pineapple)    Bananas  As you recover:    Limit fat intake to less than 15 grams per day. Don t eat margarine, butter, oils, mayonnaise, sauces, gravies,  fried foods, peanut butter, meat, poultry, or fish.    Limit fiber. Don t eat raw or cooked vegetables, fresh fruits except bananas, or bran cereals.    Limit caffeine and chocolate.    Limit dairy.    Don t use spices or seasonings except salt.    Go back to your normal diet over time, as you feel better and your symptoms improve.    If the symptoms come back, go back to a simple diet or clear liquids.  Follow-up care  Follow up with your healthcare provider, or as advised. If a stool sample was taken or cultures were done, call the healthcare provider for the results as instructed.  Call 911  Call 911 if you have any of these symptoms:    Trouble breathing    Confusion    Extreme drowsiness or trouble walking    Loss of consciousness    Rapid heart rate    Chest pain    Stiff neck    Seizure   When to seek medical advice  Call your healthcare provider right away if any of these occur:    Abdominal pain that gets worse    Constant lower right abdominal pain    Continued vomiting and inability to keep liquids down    Diarrhea more than 5 times a day    Blood in vomit or stool    Dark urine or no urine for 8 hours, dry mouth and tongue, tiredness, weakness, or dizziness    Drowsiness    New rash    You don t get better in 2 to 3 days    Fever of 100.4 F (38 C) or higher, or as directed by your healthcare provider  Date Last Reviewed: 6/1/2018 2000-2018 [employer]. [address] [place] [address], [place] [address]. All rights reserved. This information is not intended as a substitute for professional medical care. Always follow your healthcare professional's instructions.        Dysphagia (Adult)    Dysphagia is trouble swallowing. If you have dysphagia, you may have symptoms that include:    Choking or coughing when you eat or drink    Food getting stuck    Drooling    Inability to swallow    Pain behind the breastbone after swallowing    Vomiting after you eat or drink    Aspirating (inhaling into the  lungs) foods or liquids when you swallow  The main causes of dysphagia are problems that affect the mouth, throat or tongue. Dysphagia may be caused by a problem with the esophagus (tube that carries food from the mouth to the stomach). These include blockage, swelling, or irritation from acid reflux or injury. An infection of the esophagus or an allergic reaction in the esophagus can also cause dysphagia. Problems in the brain, such as a stroke or Parkinson's disease, can affect the muscles that coordinate swallowing.  Dysphagia is treated by treating the cause. Your healthcare provider may evaluate you using X-ray, special esophagus monitors, a fiber optic evaluation of swallowing, or an upper endoscopy, which uses a thin, lighted tube sent through the mouth to the esophagus. You may be given medicine to reduce stomach acid or control muscle spasms. If the problem doesn't go away, a procedure can be done to widen (dilate) the esophagus. If you have muscle or nerve problems, you may be advised to see a speech or occupational therapist. He or she may give you exercises and instructions to help make eating safer. If you have an infection or allergic condition, your healthcare provide will prescribe medicine for it.  Home care  To help ease the symptoms of dysphagia:    Take any medicine you ve been given exactly as directed. Ask for liquid medicines if you need them.    To make eating easier:  ? Eat slowly.   ? Eat in a relaxed setting.  ? Don t talk while you eat.  ? Take small bites. Chew slowly and completely before you swallow.  ? Sit upright during and after meals. Chewing food releases enzymes in your mouth that start the digestive process. Chew soft foods at least 5 to10 times. Chew more dense food (meats and vegetables) up to 30 times before swallowing. Count the number of times you chew until you get a sense of how soft the food needs to be before swallowing.  ? Don't eat dry bread products or meat  fibers.  ? Puree solid foods if needed. Thicken liquids with milk, juice, broth, gravy, or starch to make them easier to swallow.  ? Ask your healthcare provider if a liquid diet may be better for you.  Follow-up care  Follow up with your healthcare provider or as directed. Your healthcare provider can give you information about tests you may need.   When to seek medical advice  Call your healthcare provider right away for any of the following:    Inability to keep down food or liquid    Symptoms that get worse quickly    Coughing that won't stop    Continuing to lose weight    Fever of 100.4 F (38 C) or higher, or as directed by your healthcare provider    Other symptoms as indicated by your healthcare provider  Call 911  Call 911 for any of the following:    Trouble breathing    Inability to talk    Drooling, inability to control secretions    Loss of consciousness  Date Last Reviewed: 3/1/2018    9889-0128 [employer]. [address] [place] [address]. All rights reserved. This information is not intended as a substitute for professional medical care. Always follow your healthcare professional's instructions.        Earwax Removal    The ear canal makes earwax from the canal s lining. The ears make wax to lubricate and protect the ear canal. The ear canal is the tube that connects the middle ear to the outside of the ear. The wax protects the ear from bacteria, infection, and damage from water or trauma.  The wax that forms in the canal naturally moves toward the outside of the ear and falls out. In some cases, the ear may make too much wax. If the wax causes problems or keeps the healthcare provider from seeing into the ear, the extra wax may be removed.  Too much wax can affect your hearing. It can cause itching. In rare cases, it can be painful. Earwax should not be removed unless it is causing a problem. You should not stick objects into your ear to remove wax unless told to do so by  your healthcare provider.  Healthcare providers can remove earwax safely. It is important to stay still during the procedure to avoid damage to the ear canal. But removing earwax generally doesn t hurt. You will not usually need anesthesia or pain medicine when the provider removes the earwax.  A number of conditions lead to earwax buildup. These include some skin problems, a narrow ear canal, or ears that make too much earwax. Using cotton swabs in the canal pushes earwax deeper into the ear and contributes to the buildup of earwax.  Home care    The healthcare provider may recommend mineral oil or an over-the-counter eardrop to use at home to soften the earwax. Use these products only if the provider recommends them. Carefully follow the instructions given.    Don t use mineral oil or OTC eardrops if you might have an ear infection or a ruptured eardrum. Tell your healthcare provider right away if you have diabetes or an immune disorder.    Don t use cotton swabs in your ears. Cotton swabs may push wax deeper into the ear canal or damage the eardrum. Use cotton gauze or a wet washcloth  to gently remove wax on the outside of the ear and around the opening to the ear canal.    Don't use any probing device or object such as cotton-tipped swabs or donna pins to clean the inside of your ears.    Don t use ear candles to clean your ears. Candling can be dangerous. It can burn the ear canal. It can also make the condition worse instead of better.    Don t use cold water to rinse the ear. This will make you dizzy. If your provider tells you to rinse your ear, use only warm water or follow his or her instructions.    Check the ear for signs of infection or irritation listed below under When to seek medical advice.  Steps for using eardrops  1. Warm the medicine bottle by rubbing it between your hands for a few minutes.  2. Lie down on your side, with the affected ear up.  3. Place the recommended number of drops in the  ear. Wet a cotton ball with the medicine. Gently put the cotton ball into the ear opening.  Follow-up care  Follow up with your healthcare provider, or as directed.  When to seek medical advice  Call the provider right away if you have:    Ear pain that gets worse    Fever of 100.4F F (38 C) or higher, or as directed by your healthcare provider    Worsening wax buildup    Severe pain, dizziness, or nausea    Bleeding from the ear    Hearing problems    Signs of irritation from the eardrops, such as burning, stinging, or swelling and tenderness    Foul-smelling fluid draining from the ear    Swelling, redness, or tenderness of the outer ear    Headache, neck pain, or stiff neck  Date Last Reviewed: 6/1/2017 2000-2018 [employer]. [address] [place] [address], [place] [address]. All rights reserved. This information is not intended as a substitute for professional medical care. Always follow your healthcare professional's instructions.            Brian Perez MD  [facility]    Injectable Influenza Immunization Documentation    1.  Is the person to be vaccinated sick today?   No    2. Does the person to be vaccinated have an allergy to a component   of the vaccine?   No  Egg Allergy Algorithm Link    3. Has the person to be vaccinated ever had a serious reaction   to influenza vaccine in the past?   No    4. Has the person to be vaccinated ever had Guillain-Barré syndrome?   No    Form completed by Berkley Flores CMA (AAMA) 9:11 AM 12/3/2018

## 2019-01-01 ENCOUNTER — NURSING HOME VISIT (OUTPATIENT)
Dept: GERIATRICS | Facility: CLINIC | Age: 84
End: 2019-01-01
Payer: COMMERCIAL

## 2019-01-01 ENCOUNTER — NURSING HOME VISIT (OUTPATIENT)
Dept: GERIATRICS | Facility: CLINIC | Age: 84
End: 2019-01-01
Payer: MEDICARE

## 2019-01-01 ENCOUNTER — TELEPHONE (OUTPATIENT)
Dept: GERIATRICS | Facility: CLINIC | Age: 84
End: 2019-01-01

## 2019-01-01 ENCOUNTER — RECORDS - HEALTHEAST (OUTPATIENT)
Dept: LAB | Facility: CLINIC | Age: 84
End: 2019-01-01

## 2019-01-01 ENCOUNTER — MEDICAL CORRESPONDENCE (OUTPATIENT)
Dept: HEALTH INFORMATION MANAGEMENT | Facility: CLINIC | Age: 84
End: 2019-01-01

## 2019-01-01 ENCOUNTER — TRANSFERRED RECORDS (OUTPATIENT)
Dept: HEALTH INFORMATION MANAGEMENT | Facility: CLINIC | Age: 84
End: 2019-01-01

## 2019-01-01 ENCOUNTER — CLINICAL UPDATE (OUTPATIENT)
Dept: PHARMACY | Facility: CLINIC | Age: 84
End: 2019-01-01

## 2019-01-01 ENCOUNTER — DOCUMENTATION ONLY (OUTPATIENT)
Dept: OTHER | Facility: CLINIC | Age: 84
End: 2019-01-01

## 2019-01-01 ENCOUNTER — OFFICE VISIT (OUTPATIENT)
Dept: FAMILY MEDICINE | Facility: CLINIC | Age: 84
End: 2019-01-01
Payer: COMMERCIAL

## 2019-01-01 ENCOUNTER — AMBULATORY - HEALTHEAST (OUTPATIENT)
Dept: OTHER | Facility: CLINIC | Age: 84
End: 2019-01-01

## 2019-01-01 VITALS
RESPIRATION RATE: 16 BRPM | HEART RATE: 66 BPM | SYSTOLIC BLOOD PRESSURE: 158 MMHG | OXYGEN SATURATION: 98 % | DIASTOLIC BLOOD PRESSURE: 80 MMHG | BODY MASS INDEX: 18.25 KG/M2 | TEMPERATURE: 97.8 F | WEIGHT: 103 LBS

## 2019-01-01 VITALS
SYSTOLIC BLOOD PRESSURE: 123 MMHG | OXYGEN SATURATION: 95 % | TEMPERATURE: 97.6 F | HEIGHT: 63 IN | HEART RATE: 57 BPM | DIASTOLIC BLOOD PRESSURE: 71 MMHG | WEIGHT: 99.6 LBS | RESPIRATION RATE: 16 BRPM | BODY MASS INDEX: 17.65 KG/M2

## 2019-01-01 VITALS
HEIGHT: 63 IN | TEMPERATURE: 98.4 F | DIASTOLIC BLOOD PRESSURE: 76 MMHG | SYSTOLIC BLOOD PRESSURE: 112 MMHG | BODY MASS INDEX: 18.39 KG/M2 | WEIGHT: 103.8 LBS | HEART RATE: 62 BPM | OXYGEN SATURATION: 98 %

## 2019-01-01 VITALS
HEART RATE: 70 BPM | TEMPERATURE: 97.8 F | OXYGEN SATURATION: 97 % | BODY MASS INDEX: 18.17 KG/M2 | SYSTOLIC BLOOD PRESSURE: 130 MMHG | RESPIRATION RATE: 17 BRPM | DIASTOLIC BLOOD PRESSURE: 60 MMHG | WEIGHT: 102.6 LBS

## 2019-01-01 VITALS
TEMPERATURE: 98.6 F | HEART RATE: 74 BPM | DIASTOLIC BLOOD PRESSURE: 57 MMHG | BODY MASS INDEX: 19.03 KG/M2 | OXYGEN SATURATION: 100 % | RESPIRATION RATE: 18 BRPM | WEIGHT: 107.4 LBS | SYSTOLIC BLOOD PRESSURE: 91 MMHG

## 2019-01-01 VITALS
DIASTOLIC BLOOD PRESSURE: 83 MMHG | WEIGHT: 102 LBS | HEART RATE: 65 BPM | OXYGEN SATURATION: 95 % | TEMPERATURE: 98.2 F | SYSTOLIC BLOOD PRESSURE: 127 MMHG | BODY MASS INDEX: 18.07 KG/M2 | RESPIRATION RATE: 18 BRPM

## 2019-01-01 VITALS
RESPIRATION RATE: 16 BRPM | WEIGHT: 102.6 LBS | BODY MASS INDEX: 18.17 KG/M2 | DIASTOLIC BLOOD PRESSURE: 54 MMHG | OXYGEN SATURATION: 98 % | HEART RATE: 77 BPM | TEMPERATURE: 98.7 F | SYSTOLIC BLOOD PRESSURE: 113 MMHG

## 2019-01-01 VITALS
HEART RATE: 71 BPM | SYSTOLIC BLOOD PRESSURE: 111 MMHG | TEMPERATURE: 97.6 F | WEIGHT: 106.6 LBS | RESPIRATION RATE: 20 BRPM | DIASTOLIC BLOOD PRESSURE: 69 MMHG | OXYGEN SATURATION: 97 % | BODY MASS INDEX: 18.88 KG/M2

## 2019-01-01 VITALS
HEART RATE: 69 BPM | TEMPERATURE: 99.3 F | BODY MASS INDEX: 17.64 KG/M2 | RESPIRATION RATE: 10 BRPM | WEIGHT: 99.6 LBS | OXYGEN SATURATION: 95 % | DIASTOLIC BLOOD PRESSURE: 56 MMHG | SYSTOLIC BLOOD PRESSURE: 97 MMHG

## 2019-01-01 VITALS
RESPIRATION RATE: 21 BRPM | HEART RATE: 74 BPM | TEMPERATURE: 97.8 F | OXYGEN SATURATION: 96 % | DIASTOLIC BLOOD PRESSURE: 81 MMHG | BODY MASS INDEX: 17.43 KG/M2 | SYSTOLIC BLOOD PRESSURE: 145 MMHG | WEIGHT: 98.4 LBS

## 2019-01-01 VITALS
OXYGEN SATURATION: 98 % | SYSTOLIC BLOOD PRESSURE: 124 MMHG | TEMPERATURE: 98.6 F | BODY MASS INDEX: 18.25 KG/M2 | DIASTOLIC BLOOD PRESSURE: 65 MMHG | HEART RATE: 75 BPM | WEIGHT: 103 LBS | RESPIRATION RATE: 16 BRPM

## 2019-01-01 VITALS
RESPIRATION RATE: 14 BRPM | SYSTOLIC BLOOD PRESSURE: 129 MMHG | TEMPERATURE: 96.7 F | WEIGHT: 105.8 LBS | OXYGEN SATURATION: 99 % | HEART RATE: 69 BPM | BODY MASS INDEX: 18.74 KG/M2 | DIASTOLIC BLOOD PRESSURE: 75 MMHG

## 2019-01-01 DIAGNOSIS — R41.0 CONFUSION: ICD-10-CM

## 2019-01-01 DIAGNOSIS — F06.4 ANXIETY DISORDER DUE TO MEDICAL CONDITION: ICD-10-CM

## 2019-01-01 DIAGNOSIS — R19.7 DIARRHEA, UNSPECIFIED TYPE: ICD-10-CM

## 2019-01-01 DIAGNOSIS — S72.141A: Primary | ICD-10-CM

## 2019-01-01 DIAGNOSIS — G30.1 LATE ONSET ALZHEIMER'S DISEASE WITH BEHAVIORAL DISTURBANCE (H): ICD-10-CM

## 2019-01-01 DIAGNOSIS — R52 PAIN: ICD-10-CM

## 2019-01-01 DIAGNOSIS — E87.6 HYPOKALEMIA: ICD-10-CM

## 2019-01-01 DIAGNOSIS — I25.10 CORONARY ARTERY DISEASE INVOLVING NATIVE CORONARY ARTERY OF NATIVE HEART WITHOUT ANGINA PECTORIS: Primary | ICD-10-CM

## 2019-01-01 DIAGNOSIS — J45.30 MILD PERSISTENT ASTHMA WITHOUT COMPLICATION: ICD-10-CM

## 2019-01-01 DIAGNOSIS — N18.30 CKD (CHRONIC KIDNEY DISEASE), STAGE III (H): ICD-10-CM

## 2019-01-01 DIAGNOSIS — J44.9 COPD, MILD (H): ICD-10-CM

## 2019-01-01 DIAGNOSIS — K21.9 GASTROESOPHAGEAL REFLUX DISEASE WITHOUT ESOPHAGITIS: ICD-10-CM

## 2019-01-01 DIAGNOSIS — J44.9 COPD, MILD (H): Primary | ICD-10-CM

## 2019-01-01 DIAGNOSIS — G30.1 LATE ONSET ALZHEIMER'S DISEASE WITHOUT BEHAVIORAL DISTURBANCE (H): Primary | ICD-10-CM

## 2019-01-01 DIAGNOSIS — E78.5 HYPERLIPIDEMIA LDL GOAL <100: ICD-10-CM

## 2019-01-01 DIAGNOSIS — D36.9 TUBULAR ADENOMA: ICD-10-CM

## 2019-01-01 DIAGNOSIS — I10 ESSENTIAL HYPERTENSION WITH GOAL BLOOD PRESSURE LESS THAN 140/90: ICD-10-CM

## 2019-01-01 DIAGNOSIS — F02.80 LATE ONSET ALZHEIMER'S DISEASE WITHOUT BEHAVIORAL DISTURBANCE (H): Primary | ICD-10-CM

## 2019-01-01 DIAGNOSIS — I25.10 CORONARY ARTERY DISEASE INVOLVING NATIVE CORONARY ARTERY OF NATIVE HEART WITHOUT ANGINA PECTORIS: ICD-10-CM

## 2019-01-01 DIAGNOSIS — R54 FRAIL ELDERLY: ICD-10-CM

## 2019-01-01 DIAGNOSIS — E44.1 MILD PROTEIN-CALORIE MALNUTRITION (H): ICD-10-CM
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
## 2019-01-01 DIAGNOSIS — E44.0 MODERATE PROTEIN-CALORIE MALNUTRITION (H): ICD-10-CM

## 2019-01-01 DIAGNOSIS — G30.1 LATE ONSET ALZHEIMER'S DISEASE WITH BEHAVIORAL DISTURBANCE (H): Primary | ICD-10-CM

## 2019-01-01 DIAGNOSIS — I10 ESSENTIAL HYPERTENSION: ICD-10-CM

## 2019-01-01 DIAGNOSIS — R35.1 NOCTURIA: ICD-10-CM

## 2019-01-01 DIAGNOSIS — F02.818 LATE ONSET ALZHEIMER'S DISEASE WITH BEHAVIORAL DISTURBANCE (H): Primary | ICD-10-CM

## 2019-01-01 DIAGNOSIS — Z51.5 HOSPICE CARE PATIENT: ICD-10-CM

## 2019-01-01 DIAGNOSIS — S32.592A CLOSED FRACTURE OF LEFT INFERIOR PUBIC RAMUS, INITIAL ENCOUNTER (H): ICD-10-CM

## 2019-01-01 DIAGNOSIS — M62.81 GENERALIZED MUSCLE WEAKNESS: ICD-10-CM

## 2019-01-01 DIAGNOSIS — I10 ESSENTIAL HYPERTENSION: Primary | ICD-10-CM

## 2019-01-01 DIAGNOSIS — Z00.00 ANNUAL PHYSICAL EXAM: ICD-10-CM

## 2019-01-01 DIAGNOSIS — S32.512A CLOSED FRACTURE OF LEFT SUPERIOR PUBIC RAMUS, INITIAL ENCOUNTER: ICD-10-CM

## 2019-01-01 DIAGNOSIS — R50.9 FEVER, UNSPECIFIED FEVER CAUSE: ICD-10-CM

## 2019-01-01 DIAGNOSIS — J45.40 MODERATE PERSISTENT ASTHMA WITHOUT COMPLICATION: ICD-10-CM

## 2019-01-01 DIAGNOSIS — N32.81 OAB (OVERACTIVE BLADDER): ICD-10-CM

## 2019-01-01 DIAGNOSIS — R40.1 STUPOR: Primary | ICD-10-CM

## 2019-01-01 DIAGNOSIS — J30.2 SEASONAL ALLERGIC RHINITIS, UNSPECIFIED TRIGGER: ICD-10-CM

## 2019-01-01 DIAGNOSIS — F02.818 LATE ONSET ALZHEIMER'S DISEASE WITH BEHAVIORAL DISTURBANCE (H): ICD-10-CM

## 2019-01-01 DIAGNOSIS — J98.8 RESPIRATORY TRACT INFECTION: ICD-10-CM

## 2019-01-01 DIAGNOSIS — N39.46 MIXED STRESS AND URGE URINARY INCONTINENCE: ICD-10-CM

## 2019-01-01 DIAGNOSIS — J45.20 MILD INTERMITTENT ASTHMA WITHOUT COMPLICATION: ICD-10-CM

## 2019-01-01 LAB
ALBUMIN UR-MCNC: NEGATIVE MG/DL
ANION GAP SERPL CALCULATED.3IONS-SCNC: 11 MMOL/L (ref 5–18)
ANION GAP SERPL CALCULATED.3IONS-SCNC: 11 MMOL/L (ref 5–18)
ANION GAP SERPL CALCULATED.3IONS-SCNC: 8 MMOL/L (ref 5–18)
ANION GAP SERPL CALCULATED.3IONS-SCNC: 9 MMOL/L (ref 5–18)
APPEARANCE UR: CLEAR
BACTERIA SPEC CULT: NO GROWTH
BASOPHILS # BLD AUTO: 0 THOU/UL (ref 0–0.2)
BASOPHILS # BLD AUTO: 0.1 THOU/UL (ref 0–0.2)
BASOPHILS NFR BLD AUTO: 0 % (ref 0–2)
BASOPHILS NFR BLD AUTO: 0 % (ref 0–2)
BILIRUB UR QL STRIP: NEGATIVE
BUN SERPL-MCNC: 25 MG/DL (ref 8–28)
BUN SERPL-MCNC: 27 MG/DL (ref 8–28)
BUN SERPL-MCNC: 27 MG/DL (ref 8–28)
BUN SERPL-MCNC: 42 MG/DL (ref 8–28)
CALCIUM SERPL-MCNC: 9 MG/DL (ref 8.5–10.5)
CALCIUM SERPL-MCNC: 9.1 MG/DL (ref 8.5–10.5)
CHLORIDE BLD-SCNC: 104 MMOL/L (ref 98–107)
CHLORIDE BLD-SCNC: 104 MMOL/L (ref 98–107)
CHLORIDE BLD-SCNC: 106 MMOL/L (ref 98–107)
CHLORIDE SERPLBLD-SCNC: 106 MMOL/L (ref 98–107)
CO2 SERPL-SCNC: 24 MMOL/L (ref 22–31)
CO2 SERPL-SCNC: 24 MMOL/L (ref 22–31)
CO2 SERPL-SCNC: 25 MMOL/L (ref 22–31)
CO2 SERPL-SCNC: 25 MMOL/L (ref 22–31)
COLOR UR AUTO: YELLOW
CREAT SERPL-MCNC: 1.2 MG/DL (ref 0.7–1.3)
CREAT SERPL-MCNC: 1.2 MG/DL (ref 0.7–1.3)
CREAT SERPL-MCNC: 1.21 MG/DL (ref 0.7–1.3)
CREAT SERPL-MCNC: 1.63 MG/DL (ref 0.7–1.3)
EOSINOPHIL # BLD AUTO: 0.2 THOU/UL (ref 0–0.4)
EOSINOPHIL # BLD AUTO: 0.2 THOU/UL (ref 0–0.4)
EOSINOPHIL NFR BLD AUTO: 1 % (ref 0–6)
EOSINOPHIL NFR BLD AUTO: 2 % (ref 0–6)
ERYTHROCYTE [DISTWIDTH] IN BLOOD BY AUTOMATED COUNT: 13.3 % (ref 11–14.5)
ERYTHROCYTE [DISTWIDTH] IN BLOOD BY AUTOMATED COUNT: 13.3 % (ref 11–14.5)
FLUAV AG SPEC QL IA: NORMAL
FLUBV AG SPEC QL IA: NORMAL
GFR SERPL CREATININE-BSD FRML MDRD: 41 ML/MIN/1.73M2
GFR SERPL CREATININE-BSD FRML MDRD: 57 ML/MIN/1.73M2
GFR SERPL CREATININE-BSD FRML MDRD: 58 ML/MIN/1.73M2
GFR SERPL CREATININE-BSD FRML MDRD: 58 ML/MIN/1.73M2
GLUCOSE BLD-MCNC: 48 MG/DL (ref 70–125)
GLUCOSE BLD-MCNC: 82 MG/DL (ref 70–125)
GLUCOSE BLD-MCNC: 86 MG/DL (ref 70–125)
GLUCOSE SERPL-MCNC: 48 MG/DL (ref 70–125)
GLUCOSE UR STRIP-MCNC: NEGATIVE MG/DL
HBA1C MFR BLD: 5.6 % (ref 4.2–6.1)
HBA1C MFR BLD: 5.6 % (ref 4.2–6.1)
HCT VFR BLD AUTO: 31.8 % (ref 40–54)
HCT VFR BLD AUTO: 32.1 % (ref 40–54)
HGB BLD-MCNC: 10.1 G/DL (ref 14–18)
HGB BLD-MCNC: 10.3 G/DL (ref 14–18)
HGB BLD-MCNC: 10.5 G/DL (ref 14–18)
HGB BLD-MCNC: 12.3 G/DL (ref 14–18)
HGB UR QL STRIP: NEGATIVE
KETONES UR STRIP-MCNC: NEGATIVE MG/DL
LEUKOCYTE ESTERASE UR QL STRIP: NEGATIVE
LYMPHOCYTES # BLD AUTO: 1 THOU/UL (ref 0.8–4.4)
LYMPHOCYTES # BLD AUTO: 1.1 THOU/UL (ref 0.8–4.4)
LYMPHOCYTES NFR BLD AUTO: 11 % (ref 20–40)
LYMPHOCYTES NFR BLD AUTO: 9 % (ref 20–40)
MCH RBC QN AUTO: 28.3 PG (ref 27–34)
MCH RBC QN AUTO: 28.7 PG (ref 27–34)
MCHC RBC AUTO-ENTMCNC: 32.4 G/DL (ref 32–36)
MCHC RBC AUTO-ENTMCNC: 32.7 G/DL (ref 32–36)
MCV RBC AUTO: 87 FL (ref 80–100)
MCV RBC AUTO: 88 FL (ref 80–100)
MONOCYTES # BLD AUTO: 1.2 THOU/UL (ref 0–0.9)
MONOCYTES # BLD AUTO: 1.4 THOU/UL (ref 0–0.9)
MONOCYTES NFR BLD AUTO: 10 % (ref 2–10)
MONOCYTES NFR BLD AUTO: 14 % (ref 2–10)
NEUTROPHILS # BLD AUTO: 6.8 THOU/UL (ref 2–7.7)
NEUTROPHILS # BLD AUTO: 9.9 THOU/UL (ref 2–7.7)
NEUTROPHILS NFR BLD AUTO: 73 % (ref 50–70)
NEUTROPHILS NFR BLD AUTO: 80 % (ref 50–70)
NITRATE UR QL: NEGATIVE
PH UR STRIP: 5 [PH] (ref 4.5–8)
PLATELET # BLD AUTO: 148 THOU/UL (ref 140–440)
PLATELET # BLD AUTO: 179 THOU/UL (ref 140–440)
PMV BLD AUTO: 10.7 FL (ref 8.5–12.5)
PMV BLD AUTO: 11 FL (ref 8.5–12.5)
POTASSIUM BLD-SCNC: 3.8 MMOL/L (ref 3.5–5)
POTASSIUM BLD-SCNC: 4.2 MMOL/L (ref 3.5–5)
POTASSIUM BLD-SCNC: 4.3 MMOL/L (ref 3.5–5)
POTASSIUM SERPL-SCNC: 3.8 MMOL/L (ref 3.5–5)
RBC # BLD AUTO: 3.64 MILL/UL (ref 4.4–6.2)
RBC # BLD AUTO: 3.66 MILL/UL (ref 4.4–6.2)
SODIUM SERPL-SCNC: 137 MMOL/L (ref 136–145)
SODIUM SERPL-SCNC: 138 MMOL/L (ref 136–145)
SODIUM SERPL-SCNC: 141 MMOL/L (ref 136–145)
SODIUM SERPL-SCNC: 141 MMOL/L (ref 136–145)
SP GR UR STRIP: 1.01 (ref 1–1.03)
TSH SERPL DL<=0.005 MIU/L-ACNC: 1.1 UIU/ML (ref 0.3–5)
TSH SERPL DL<=0.005 MIU/L-ACNC: 1.57 UIU/ML (ref 0.3–5)
TSH SERPL-ACNC: 1.57 UIU/ML (ref 0.3–5)
UROBILINOGEN UR STRIP-ACNC: NORMAL
VIT B12 SERPL-MCNC: 298 PG/ML (ref 213–816)
WBC: 12.5 THOU/UL (ref 4–11)
WBC: 9.5 THOU/UL (ref 4–11)

## 2019-01-01 PROCEDURE — 99310 SBSQ NF CARE HIGH MDM 45: CPT | Performed by: NURSE PRACTITIONER

## 2019-01-01 PROCEDURE — 99309 SBSQ NF CARE MODERATE MDM 30: CPT | Mod: GW | Performed by: FAMILY MEDICINE

## 2019-01-01 PROCEDURE — 99309 SBSQ NF CARE MODERATE MDM 30: CPT | Performed by: NURSE PRACTITIONER

## 2019-01-01 PROCEDURE — 99309 SBSQ NF CARE MODERATE MDM 30: CPT | Mod: GV | Performed by: NURSE PRACTITIONER

## 2019-01-01 PROCEDURE — 99214 OFFICE O/P EST MOD 30 MIN: CPT | Performed by: FAMILY MEDICINE

## 2019-01-01 PROCEDURE — 99310 SBSQ NF CARE HIGH MDM 45: CPT | Mod: GV | Performed by: NURSE PRACTITIONER

## 2019-01-01 PROCEDURE — 99306 1ST NF CARE HIGH MDM 50: CPT | Performed by: FAMILY MEDICINE

## 2019-01-01 PROCEDURE — 99310 SBSQ NF CARE HIGH MDM 45: CPT | Performed by: FAMILY MEDICINE

## 2019-01-01 PROCEDURE — 99318 ZZC ANNUAL NURSING FAC ASSESSMNT, STABLE: CPT | Mod: GV | Performed by: NURSE PRACTITIONER

## 2019-01-01 RX ORDER — FLUTICASONE PROPIONATE 50 MCG
1 SPRAY, SUSPENSION (ML) NASAL DAILY PRN
Start: 2019-01-01 | End: 2019-01-01

## 2019-01-01 RX ORDER — MORPHINE SULFATE 20 MG/5ML
0.25 SOLUTION ORAL
Refills: 0
Start: 2019-01-01

## 2019-01-01 RX ORDER — ACETAMINOPHEN 325 MG/1
650 TABLET ORAL EVERY 4 HOURS PRN
Start: 2019-01-01 | End: 2019-01-01 | Stop reason: DRUGHIGH

## 2019-01-01 RX ORDER — FLUTICASONE PROPIONATE 50 MCG
2 SPRAY, SUSPENSION (ML) NASAL DAILY
COMMUNITY
End: 2019-01-01

## 2019-01-01 RX ORDER — LOPERAMIDE HYDROCHLORIDE 2 MG/1
2 TABLET ORAL 4 TIMES DAILY PRN
Start: 2019-01-01 | End: 2019-01-01

## 2019-01-01 RX ORDER — ALBUTEROL SULFATE 90 UG/1
2 AEROSOL, METERED RESPIRATORY (INHALATION) EVERY 4 HOURS PRN
Qty: 1 INHALER | Refills: 11 | Status: SHIPPED | OUTPATIENT
Start: 2019-01-01 | End: 2019-01-01

## 2019-01-01 RX ORDER — MORPHINE SULFATE 20 MG/5ML
5 SOLUTION ORAL 2 TIMES DAILY
Refills: 0
Start: 2019-01-01

## 2019-01-01 RX ORDER — ACETAMINOPHEN 325 MG/1
TABLET ORAL
Start: 2019-01-01 | End: 2019-01-01

## 2019-01-01 RX ORDER — SENNOSIDES 8.6 MG
1 TABLET ORAL DAILY
COMMUNITY

## 2019-01-01 RX ORDER — ATORVASTATIN CALCIUM 10 MG/1
10 TABLET, FILM COATED ORAL DAILY
Start: 2019-01-01 | End: 2019-01-01

## 2019-01-01 RX ORDER — QUETIAPINE FUMARATE 25 MG/1
12.5 TABLET, FILM COATED ORAL 2 TIMES DAILY
COMMUNITY

## 2019-01-01 ASSESSMENT — MIFFLIN-ST. JEOR
SCORE: 1055.96
SCORE: 1031.91

## 2019-01-08 NOTE — PROGRESS NOTES
SUBJECTIVE:   Evin Melgar is a 84 year old male who presents to clinic today for the following health issues:  Chief Complaint   Patient presents with     Forms     Pt here to have forms filled out for memory care.     Refill Request     Refeill on ranitidine.     Patient is here with spouse and one of his daughters.    Patient  being admitted to [facility] on 1/11/19.  They bring in forms for admission.    Patient has progressive dementia.  Family has reported patient has fallen twice in last 48 hours accidentally.  Patient has been reported not to eat as much as before, and he has been at risk of wandering out of apartment.  Due to increasing difficulty for spouse to take care of patient and due to his increasing needs, family has decided to move patient to memory care.    Family reports patient's chronic conditions have been stable.  Has not been reported to have chest pain, dyspnea, HA, BOV, dizziness or urinary changes.  He does still get up few times a night to void. He wears adult diaper only at night; he does not soil pants.    Still takes his cardiac medications.    No recent reported asthma exacerbations.      Problem list and histories reviewed & adjusted, as indicated.  Additional history: as documented    Patient Active Problem List   Diagnosis     Hypertension     Mild persistent asthma without complication     CAD (coronary artery disease)     Tubular adenoma     CKD (chronic kidney disease), stage III (H)     GERD (gastroesophageal reflux disease)     Hyperlipidemia LDL goal <100     Advanced directives, counseling/discussion     OAB (overactive bladder)     Microscopic hematuria     Memory deficit     Health Care Home     COPD, mild (H)     Pain of left upper arm     Nocturia     POLST (Physician Orders for Life-Sustaining Treatment)     Anxiety disorder due to medical condition     Alzheimer's disease     Past Surgical History:   Procedure Laterality Date     ANGIOGRAM  10/07     coronary=mild, diffuse CAD; stent below patent     ANGIOPLASTY      stent to ostia of LAD     SURGICAL HISTORY OF -       Appendectomy       Social History     Tobacco Use     Smoking status: Former Smoker     Last attempt to quit: 1949     Years since quittin.0     Smokeless tobacco: Never Used   Substance Use Topics     Alcohol use: Yes     Comment: occasionally     Family History   Problem Relation Age of Onset     Cerebrovascular Disease Mother      Lipids Mother      Diabetes Father      Cerebrovascular Disease Father      Diabetes Brother      Heart Disease Brother          Current Outpatient Medications   Medication Sig Dispense Refill     albuterol (PROAIR HFA) 108 (90 Base) MCG/ACT inhaler Inhale 2 puffs into the lungs every 4 hours as needed for shortness of breath / dyspnea Rinse mouthpiece weekly. 1 Inhaler 0     amLODIPine (NORVASC) 5 MG tablet Take 1 tablet (5 mg) by mouth daily 90 tablet 3     atorvastatin (LIPITOR) 20 MG tablet Take 1 tablet (20 mg) by mouth daily 90 tablet 3     clopidogrel (PLAVIX) 75 MG tablet Take 1 tablet (75 mg) by mouth daily 90 tablet 3     fluticasone (FLONASE) 50 MCG/ACT spray Spray 1-2 sprays into both nostrils daily 16 g 0     lisinopril (PRINIVIL/ZESTRIL) 2.5 MG tablet Take 1 tablet (2.5 mg) by mouth daily 90 tablet 3     melatonin 5 MG tablet Take 1 tablet (5 mg) by mouth nightly as needed for sleep       metoprolol succinate (TOPROL-XL) 25 MG 24 hr tablet Take 1 tablet (25 mg) by mouth daily 90 tablet 3     ranitidine (ZANTAC) 150 MG tablet Take 1 tablet (150 mg) by mouth 2 times daily as needed for heartburn 180 tablet 3     nitroglycerin (NITROSTAT) 0.4 MG SL tablet Place 1 tablet (0.4 mg) under the tongue every 5 minutes as needed for chest pain for chest pain 25 tablet 3     No Known Allergies    Reviewed and updated as needed this visit by clinical staff  Tobacco  Allergies  Meds  Med Hx  Surg Hx  Fam Hx  Soc Hx      Reviewed and updated as  "needed this visit by Provider         ROS:  C: NEGATIVE for fever, chills, or change in weight  I: NEGATIVE for worrisome rashes, moles or lesions  E: NEGATIVE for vision changes or irritation  ENT: NEGATIVE for ear, mouth and throat problems  R: NEGATIVE for significant cough or SOB  CV: NEGATIVE for chest pain, palpitations or peripheral edema  GI: NEGATIVE for nausea, abdominal pain, heartburn, or change in bowel habits  :negative for dysuria, hematuria, decreased urinary stream, or discharge  M: NEGATIVE for significant arthralgias or myalgia  N: NEGATIVE for weakness, dizziness or paresthesias  E: NEGATIVE for temperature intolerance, skin/hair changes  H: NEGATIVE for bleeding problems  P: NEGATIVE for changes in mood or affect    OBJECTIVE:                                                    /76   Pulse 62   Temp 98.4  F (36.9  C) (Tympanic)   Ht 1.6 m (5' 3\")   Wt 47.1 kg (103 lb 12.8 oz)   SpO2 98%   BMI 18.39 kg/m    Body mass index is 18.39 kg/m .  GENERAL:  alert and no distress, ambulatory w/o assist  NECK: no tenderness, no adenopathy,  Thyroid not enlarged  RESP: lungs clear to auscultation - no rales, no rhonchi, no wheezes  CV: regular rates and rhythm, no murmur  MS: no edema  SKIN: no suspicious lesions, no rashes  NEURO: strength and tone- normal, sensory exam- grossly normal, mentation- pleasantly demented, speech- normal but fund of knowledge is lacking, reflexes- symmetric and normoreflexic  ABD:  nontender    Diagnostic test results:  Diagnostic Test Results:  none      ASSESSMENT/PLAN:                                                        ICD-10-CM    1. Late onset Alzheimer's disease with behavioral disturbance G30.1     F02.81    2. Coronary artery disease involving native coronary artery of native heart without angina pectoris I25.10    3. Mild intermittent asthma without complication J45.20    4. Essential hypertension with goal blood pressure less than 140/90 I10    5. " Nocturia R35.1    6. Gastroesophageal reflux disease without esophagitis K21.9 ranitidine (ZANTAC) 150 MG tablet     Patient has progressive dementia.  He is a good candidate for admission to memory care; family is on board.  Filled out admission form.  Diet preferred by daughter to be unrestricted due to patient not even eating as much anymore.  Ambulation as tolerated. Add fall precautions if with frequent falls.  Physical, occulational and speech therapy ordered. If abnormal swallow, change diet recommendations.  Med list provided with form.  Memory Care provider to follow patient primarily while admitted?  Patient's POLST has already been filled out before and is current with no reproted changes.    Follow up with Provider - prn   There are no Patient Instructions on file for this visit.    Brian Perez MD  [facility]

## 2019-01-08 NOTE — PATIENT INSTRUCTIONS
Form for [facility] filled out and med list provided today.  If patient has been found to have aspiration or abnormal swallow study, will need to change diet orders.  Fall precaution if with frequent falls.

## 2019-01-09 NOTE — TELEPHONE ENCOUNTER
"Requested Prescriptions   Pending Prescriptions Disp Refills     albuterol (PROAIR HFA) 108 (90 Base) MCG/ACT inhaler 1 Inhaler 0     Sig: Inhale 2 puffs into the lungs every 4 hours as needed for shortness of breath / dyspnea Rinse mouthpiece weekly.    Asthma Maintenance Inhalers - Anticholinergics Failed - 1/9/2019  2:45 PM       Failed - Asthma control assessment score within normal limits in last 6 months    Please review ACT score.          Passed - Patient is age 12 years or older       Passed - Medication is active on med list       Passed - Recent (6 mo) or future (30 days) visit within the authorizing provider's specialty    Patient had office visit in the last 6 months or has a visit in the next 30 days with authorizing provider or within the authorizing provider's specialty.  See \"Patient Info\" tab in inbasket, or \"Choose Columns\" in Meds & Orders section of the refill encounter.            Last Written Prescription Date:  11/21/18  Last Fill Quantity: 1,  # refills: 0   Last office visit: 1/8/2019 with prescribing provider:  Ana   Future Office Visit:      "

## 2019-01-11 NOTE — TELEPHONE ENCOUNTER
Routing refill request to provider for review/approval because:  Last ACT not at goal.  LOV 01/08/19 Perez    ACT Total Scores 6/8/2017 12/5/2017 12/3/2018   ACT TOTAL SCORE - - -   ASTHMA ER VISITS - - -   ASTHMA HOSPITALIZATIONS - - -   ACT TOTAL SCORE (Goal Greater than or Equal to 20) 17 12 10   In the past 12 months, how many times did you visit the emergency room for your asthma without being admitted to the hospital? 0 0 0   In the past 12 months, how many times were you hospitalized overnight because of your asthma? 0 0 0        SEB TimmonsN, RN

## 2019-01-15 NOTE — LETTER
1/15/2019        RE: Evin Melgar  [address]  [address]  [facility] [address]        [facility]  PRIMARY CARE PROVIDER AND CLINIC:  Brian Perez [address] [facility] [address]  Chief Complaint   Patient presents with     Hospital F/U     [facility] Medical Record Number:  [id number]  Place of Service where encounter took place:  [facility]  ([facility]) [[id number]]    HPI:    Evin Melgar is a 84 year old  (6/1/1934),admitted to the above facility from  home.  Admitted to this facility for  medical management and nursing care.  HPI information obtained from: facility chart records, facility staff, patient report and [facility] chart review.      Joao has a past medical history significant for progressive dementia occasionally wandering away from home, falls, hypertension, asthma, COPD, coronary artery disease, CKD 3, hyperlipidemia, GERD, tubular adenoma, anxiety.  He had been living at home with his wife, but has fallen several times prior to long-term care admission.  Due to his memory loss he is also been at risk of wandering out of his  home.  His spouse has had increasing difficulty caring for him, so decision was made to move the patient to memory care LTC.    Current issues are:         COPD, mild (H)  Late onset Alzheimer's disease with behavioral disturbance  Moderate protein-calorie malnutrition (H)  CKD (chronic kidney disease), stage III (H)  OAB (overactive bladder)  Anxiety disorder due to medical condition  Hyperlipidemia LDL goal <100  Gastroesophageal reflux disease without esophagitis  Coronary artery disease involving native coronary artery of native heart without angina pectoris  Essential hypertension  Tubular adenoma     Today patient reports no concerns, is somewhat confused.  He would like to carry his albuterol inhaler with him, and he asks for this several times throughout the visit.  He reports he has no pain,  occasional shortness of breath, and reports that he has lost weight recently.    CODE STATUS/ADVANCE DIRECTIVES DISCUSSION:   DNR / DNI  Patient's living condition: lives with spouse    ALLERGIES:Patient has no known allergies.  PAST MEDICAL HISTORY:  has a past medical history of CAD (coronary artery disease) (9/4/2007), CKD (chronic kidney disease), stage III (H) (11/4/2008), GERD (gastroesophageal reflux disease) (11/4/2008), Hyperlipidaemia, Hypertension (12/13/2005), Inguinal hernia without mention of obstruction or gangrene, unilateral or unspecified, (not specified as recurrent), Memory deficit (3/2/2012), Microscopic hematuria (3/2/2012), Mild persistent asthma (12/13/2005), OAB (overactive bladder) (3/2/2012), Tubular adenoma (10/21/2008), and Unspecified essential hypertension. He also has no past medical history of Cancer (H), Obese, Sleep apnea, Thyroid disease, or Type 2 diabetes mellitus without complications (H).  PAST SURGICAL HISTORY:  has a past surgical history that includes surgical history of - ; angiogram (10/07); and angioplasty (9/07).  FAMILY HISTORY: family history includes Cerebrovascular Disease in his father and mother; Diabetes in his brother and father; Heart Disease in his brother; Lipids in his mother.  SOCIAL HISTORY:  reports that he quit smoking about 70 years ago. he has never used smokeless tobacco. He reports that he drinks alcohol. He reports that he does not use drugs.    Post Discharge Medication Reconciliation Status: discharge medications reconciled and changed, per note/orders (see AVS).  Current Outpatient Medications   Medication Sig Dispense Refill     albuterol (PROAIR HFA) 108 (90 Base) MCG/ACT inhaler Inhale 2 puffs into the lungs every 4 hours as needed for shortness of breath / dyspnea Rinse mouthpiece weekly. 1 Inhaler 11     amLODIPine (NORVASC) 5 MG tablet Take 1 tablet (5 mg) by mouth daily 90 tablet 3     atorvastatin (LIPITOR) 20 MG tablet Take 1 tablet (20  mg) by mouth daily 90 tablet 3     clopidogrel (PLAVIX) 75 MG tablet Take 1 tablet (75 mg) by mouth daily 90 tablet 3     fluticasone (FLONASE) 50 MCG/ACT nasal spray Spray 2 sprays into both nostrils daily       lisinopril (PRINIVIL/ZESTRIL) 2.5 MG tablet Take 1 tablet (2.5 mg) by mouth daily 90 tablet 3     melatonin 5 MG tablet Take 1 tablet (5 mg) by mouth nightly as needed for sleep       metoprolol succinate (TOPROL-XL) 25 MG 24 hr tablet Take 1 tablet (25 mg) by mouth daily 90 tablet 3     nitroglycerin (NITROSTAT) 0.4 MG SL tablet Place 1 tablet (0.4 mg) under the tongue every 5 minutes as needed for chest pain for chest pain 25 tablet 3       ROS:  10 point ROS of systems including Constitutional, Eyes, Respiratory, Cardiovascular, Gastroenterology, Genitourinary, Integumentary, Musculoskeletal, Psychiatric were all negative except for pertinent positives noted in my HPI.    Exam:  /54   Pulse 77   Temp 98.7  F (37.1  C)   Resp 16   Wt 46.5 kg (102 lb 9.6 oz)   SpO2 98%   BMI 18.17 kg/m     GENERAL APPEARANCE:  Alert, in no distress, thin  ENT:  Mouth and posterior oropharynx normal, moist mucous membranes  EYES:  EOM, conjunctivae, lids, pupils and irises normal  RESP:  respiratory effort and palpation of chest normal, lungs clear to auscultation , no respiratory distress  CV:  Palpation and auscultation of heart done , regular rate and rhythm, no murmur, rub, or gallop, no edema  M/S:   Gait and station normal  PSYCH:  oriented to Self and surroundings, able to make needs known, insight and judgement impaired, memory impaired     Lab/Diagnostic data:    CBC RESULTS:   Recent Labs   Lab Test 03/29/16  1137 01/02/15  0908 12/02/14  0831   WBC 7.1  --  6.3   RBC 4.69  --  4.74   HGB 13.5 13.2* 13.9   HCT 41.0  --  41.0   MCV 87  --  87   MCH 28.8  --  29.3   MCHC 32.9  --  33.9   RDW 12.4  --  12.3     --  209       Last Basic Metabolic Panel:  Recent Labs   Lab Test 11/28/18  0748  03/13/18  0801    139   POTASSIUM 3.0* 3.7   CHLORIDE 109 105   GERA 8.2* 8.7   CO2 25 27   BUN 24 18   CR 1.27* 1.26*   GLC 97 106*       Liver Function Studies -   Recent Labs   Lab Test 11/28/18  0748 03/13/18  0801  08/04/15  0746 12/02/14  0831   AST  --   --   --  23 19   ALT 20 19   < > 20 24    < > = values in this interval not displayed.       TSH   Date Value Ref Range Status   07/13/2016 1.57 0.40 - 4.00 mU/L Final   11/19/2010 2.61 0.4 - 5.0 mU/L Final         ASSESSMENT/PLAN:  Late onset Alzheimer's disease with behavioral disturbance  Patient with progressive dementia, with behaviors of wandering.  This resulted in transition to LT memory care unit for safety  As his spouse was no longer able to care for him.  Cognitive testing underway to determine baseline.  Has been seen by neurology, with last mini cog 1/5.  -Maintain safe living situation with goals focused on comfort    Moderate protein-calorie malnutrition (H)  He reports some weight loss, reports he is always been slender.  Eating well at lunch today.  Wt Readings from Last 2 Encounters:   01/15/19 46.5 kg (102 lb 9.6 oz)   01/08/19 47.1 kg (103 lb 12.8 oz)        CKD (chronic kidney disease), stage III (H)  Baseline creat 1.27, Avoid nephrotoxic medications, Renal dosing of medications, monitor kidney function routinely.      OAB (overactive bladder)  Reports especially some nocturia, and is observed to be wearing adult incontinence briefs at night.    COPD, mild (H)  Mild persistent asthma without complication  Has albuterol inhaler, as well as Flonase nasal spray.  He has no obvious shortness of breath nor wheezing today.  He would like to carry his albuterol inhaler, but concern is for loss of the inhaler due to his dementia.  Working with nursing staff to determine best course of action    Anxiety disorder due to medical condition  Diagnosis of anxiety, but he is calm today without obvious symptoms of anxiety.  We will continue to  monitor    Hyperlipidemia LDL goal <100  On atorvastatin, last fasting lipid panel November 2018 within normal limits.  He does not appear to be having any side effects from the medication, so reasonable to continue at this point.    Gastroesophageal reflux disease without esophagitis  Reports no recent symptoms of reflux, and on no routine medications.    Coronary artery disease involving native coronary artery of native heart without angina pectoris  History of CAD, on Plavix and not on aspirin. The current medical regimen is effective;  continue present plan and medications.     Essential hypertension  On amlodipine, lisinopril.  BP low normal today, will monitor could consider decrease to discontinue of amlodipine.  Goal BP in this age range <140/90.    Tubular adenoma  Tubular adenoma noted on colonoscopy 10/2008 without atypia.  Due to age  and goals of care, no further colonoscopy indicated at this time unless symptomatic or problematic.       Orders:  1. Clarify nitrostat 0.4 mg under tongue every 5 min x3 doses PRN for chest pain and update NP/MD  2. Clarify Flonase to 2 spray in both nostrils daily   3. Clarify discontinue zantac 150 mg reported patient not taking      Electronically signed by:  ROXANA Obrien CNP      Sincerely,        ROXANA Obrien CNP

## 2019-01-15 NOTE — PROGRESS NOTES
[place] [facility]  PRIMARY CARE PROVIDER AND CLINIC:  Brian Perez [place] [address] [facility] / [facility] [place] [address]  Chief Complaint   Patient presents with     Hospital F/U     [place] Medical Record Number:  [id number]  Place of Service where encounter took place:  [facility]  ([facility]) [[id number]]    HPI:    Evin Melgar is a 84 year old  (6/1/1934),admitted to the above facility from  home.  Admitted to this facility for  medical management and nursing care.  HPI information obtained from: facility chart records, facility staff, patient report and [facility] chart review.      Joao has a past medical history significant for progressive dementia occasionally wandering away from home, falls, hypertension, asthma, COPD, coronary artery disease, CKD 3, hyperlipidemia, GERD, tubular adenoma, anxiety.  He had been living at home with his wife, but has fallen several times prior to long-term care admission.  Due to his memory loss he is also been at risk of wandering out of his  home.  His spouse has had increasing difficulty caring for him, so decision was made to move the patient to memory care LTC.    Current issues are:         COPD, mild (H)  Late onset Alzheimer's disease with behavioral disturbance  Moderate protein-calorie malnutrition (H)  CKD (chronic kidney disease), stage III (H)  OAB (overactive bladder)  Anxiety disorder due to medical condition  Hyperlipidemia LDL goal <100  Gastroesophageal reflux disease without esophagitis  Coronary artery disease involving native coronary artery of native heart without angina pectoris  Essential hypertension  Tubular adenoma     Today patient reports no concerns, is somewhat confused.  He would like to carry his albuterol inhaler with him, and he asks for this several times throughout the visit.  He reports he has no pain, occasional shortness of breath, and reports that he has lost weight recently.    CODE STATUS/ADVANCE  DIRECTIVES DISCUSSION:   DNR / DNI  Patient's living condition: lives with spouse    ALLERGIES:Patient has no known allergies.  PAST MEDICAL HISTORY:  has a past medical history of CAD (coronary artery disease) (9/4/2007), CKD (chronic kidney disease), stage III (H) (11/4/2008), GERD (gastroesophageal reflux disease) (11/4/2008), Hyperlipidaemia, Hypertension (12/13/2005), Inguinal hernia without mention of obstruction or gangrene, unilateral or unspecified, (not specified as recurrent), Memory deficit (3/2/2012), Microscopic hematuria (3/2/2012), Mild persistent asthma (12/13/2005), OAB (overactive bladder) (3/2/2012), Tubular adenoma (10/21/2008), and Unspecified essential hypertension. He also has no past medical history of Cancer (H), Obese, Sleep apnea, Thyroid disease, or Type 2 diabetes mellitus without complications (H).  PAST SURGICAL HISTORY:  has a past surgical history that includes surgical history of - ; angiogram (10/07); and angioplasty (9/07).  FAMILY HISTORY: family history includes Cerebrovascular Disease in his father and mother; Diabetes in his brother and father; Heart Disease in his brother; Lipids in his mother.  SOCIAL HISTORY:  reports that he quit smoking about 70 years ago. he has never used smokeless tobacco. He reports that he drinks alcohol. He reports that he does not use drugs.    Post Discharge Medication Reconciliation Status: discharge medications reconciled and changed, per note/orders (see AVS).  Current Outpatient Medications   Medication Sig Dispense Refill     albuterol (PROAIR HFA) 108 (90 Base) MCG/ACT inhaler Inhale 2 puffs into the lungs every 4 hours as needed for shortness of breath / dyspnea Rinse mouthpiece weekly. 1 Inhaler 11     amLODIPine (NORVASC) 5 MG tablet Take 1 tablet (5 mg) by mouth daily 90 tablet 3     atorvastatin (LIPITOR) 20 MG tablet Take 1 tablet (20 mg) by mouth daily 90 tablet 3     clopidogrel (PLAVIX) 75 MG tablet Take 1 tablet (75 mg) by mouth  daily 90 tablet 3     fluticasone (FLONASE) 50 MCG/ACT nasal spray Spray 2 sprays into both nostrils daily       lisinopril (PRINIVIL/ZESTRIL) 2.5 MG tablet Take 1 tablet (2.5 mg) by mouth daily 90 tablet 3     melatonin 5 MG tablet Take 1 tablet (5 mg) by mouth nightly as needed for sleep       metoprolol succinate (TOPROL-XL) 25 MG 24 hr tablet Take 1 tablet (25 mg) by mouth daily 90 tablet 3     nitroglycerin (NITROSTAT) 0.4 MG SL tablet Place 1 tablet (0.4 mg) under the tongue every 5 minutes as needed for chest pain for chest pain 25 tablet 3       ROS:  10 point ROS of systems including Constitutional, Eyes, Respiratory, Cardiovascular, Gastroenterology, Genitourinary, Integumentary, Musculoskeletal, Psychiatric were all negative except for pertinent positives noted in my HPI.    Exam:  /54   Pulse 77   Temp 98.7  F (37.1  C)   Resp 16   Wt 46.5 kg (102 lb 9.6 oz)   SpO2 98%   BMI 18.17 kg/m    GENERAL APPEARANCE:  Alert, in no distress, thin  ENT:  Mouth and posterior oropharynx normal, moist mucous membranes  EYES:  EOM, conjunctivae, lids, pupils and irises normal  RESP:  respiratory effort and palpation of chest normal, lungs clear to auscultation , no respiratory distress  CV:  Palpation and auscultation of heart done , regular rate and rhythm, no murmur, rub, or gallop, no edema  M/S:   Gait and station normal  PSYCH:  oriented to Self and surroundings, able to make needs known, insight and judgement impaired, memory impaired     Lab/Diagnostic data:    CBC RESULTS:   Recent Labs   Lab Test 03/29/16  1137 01/02/15  0908 12/02/14  0831   WBC 7.1  --  6.3   RBC 4.69  --  4.74   HGB 13.5 13.2* 13.9   HCT 41.0  --  41.0   MCV 87  --  87   MCH 28.8  --  29.3   MCHC 32.9  --  33.9   RDW 12.4  --  12.3     --  209       Last Basic Metabolic Panel:  Recent Labs   Lab Test 11/28/18  0748 03/13/18  0801    139   POTASSIUM 3.0* 3.7   CHLORIDE 109 105   GERA 8.2* 8.7   CO2 25 27   BUN 24 18    CR 1.27* 1.26*   GLC 97 106*       Liver Function Studies -   Recent Labs   Lab Test 11/28/18  0748 03/13/18  0801  08/04/15  0746 12/02/14  0831   AST  --   --   --  23 19   ALT 20 19   < > 20 24    < > = values in this interval not displayed.       TSH   Date Value Ref Range Status   07/13/2016 1.57 0.40 - 4.00 mU/L Final   11/19/2010 2.61 0.4 - 5.0 mU/L Final         ASSESSMENT/PLAN:  Late onset Alzheimer's disease with behavioral disturbance  Patient with progressive dementia, with behaviors of wandering.  This resulted in transition to LTC memory care unit for safety  As his spouse was no longer able to care for him.  Cognitive testing underway to determine baseline.  Has been seen by neurology, with last mini cog 1/5.  -Maintain safe living situation with goals focused on comfort    Moderate protein-calorie malnutrition (H)  He reports some weight loss, reports he is always been slender.  Eating well at lunch today.  Wt Readings from Last 2 Encounters:   01/15/19 46.5 kg (102 lb 9.6 oz)   01/08/19 47.1 kg (103 lb 12.8 oz)        CKD (chronic kidney disease), stage III (H)  Baseline creat 1.27, Avoid nephrotoxic medications, Renal dosing of medications, monitor kidney function routinely.      OAB (overactive bladder)  Reports especially some nocturia, and is observed to be wearing adult incontinence briefs at night.    COPD, mild (H)  Mild persistent asthma without complication  Has albuterol inhaler, as well as Flonase nasal spray.  He has no obvious shortness of breath nor wheezing today.  He would like to carry his albuterol inhaler, but concern is for loss of the inhaler due to his dementia.  Working with nursing staff to determine best course of action    Anxiety disorder due to medical condition  Diagnosis of anxiety, but he is calm today without obvious symptoms of anxiety.  We will continue to monitor    Hyperlipidemia LDL goal <100  On atorvastatin, last fasting lipid panel November 2018 within  normal limits.  He does not appear to be having any side effects from the medication, so reasonable to continue at this point.    Gastroesophageal reflux disease without esophagitis  Reports no recent symptoms of reflux, and on no routine medications.    Coronary artery disease involving native coronary artery of native heart without angina pectoris  History of CAD, on Plavix and not on aspirin. The current medical regimen is effective;  continue present plan and medications.     Essential hypertension  On amlodipine, lisinopril.  BP low normal today, will monitor could consider decrease to discontinue of amlodipine.  Goal BP in this age range <140/90.    Tubular adenoma  Tubular adenoma noted on colonoscopy 10/2008 without atypia.  Due to age  and goals of care, no further colonoscopy indicated at this time unless symptomatic or problematic.       Orders:  1. Clarify nitrostat 0.4 mg under tongue every 5 min x3 doses PRN for chest pain and update NP/MD  2. Clarify Flonase to 2 spray in both nostrils daily   3. Clarify discontinue zantac 150 mg reported patient not taking      Electronically signed by:  ROXANA Obrien CNP

## 2019-01-16 PROBLEM — Z78.9 POLST (PHYSICIAN ORDERS FOR LIFE-SUSTAINING TREATMENT): Status: RESOLVED | Noted: 2017-11-13 | Resolved: 2019-01-01

## 2019-01-16 PROBLEM — E46 PROTEIN-CALORIE MALNUTRITION (H): Status: ACTIVE | Noted: 2019-01-01

## 2019-01-22 NOTE — LETTER
1/22/2019        RE: Evin Melgar  [address]  [address]  [facility] [place] [address]        [place] [facility]    Chief Complaint   Patient presents with     jail Acute       [place] Medical Record Number:  [id number]  Place of Service where encounter took place:  [facility]  ([facility]) [[id number]]    HPI:    Evin Melgar is a 84 year old  (6/1/1934), who is being seen today for an episodic care visit.  HPI information obtained from: facility chart records, facility staff, patient report and [facility] chart review.    Today's concern is:     Late onset Alzheimer's disease with behavioral disturbance  Anxiety disorder due to medical condition  Moderate protein-calorie malnutrition (H)  COPD, mild (H)  Essential hypertension     Patient is alert to self and surroundings, without complaints of new pain no chest pain, no dyspnea.    ALLERGIES: Patient has no known allergies.  Past Medical, Surgical, Family and Social History reviewed and updated in [facility].    Current Outpatient Medications   Medication Sig Dispense Refill     albuterol (PROAIR HFA) 108 (90 Base) MCG/ACT inhaler Inhale 2 puffs into the lungs every 4 hours as needed for shortness of breath / dyspnea Rinse mouthpiece weekly. 1 Inhaler 11     amLODIPine (NORVASC) 5 MG tablet Take 1 tablet (5 mg) by mouth daily 90 tablet 3     atorvastatin (LIPITOR) 20 MG tablet Take 1 tablet (20 mg) by mouth daily 90 tablet 3     clopidogrel (PLAVIX) 75 MG tablet Take 1 tablet (75 mg) by mouth daily 90 tablet 3     fluticasone (FLONASE) 50 MCG/ACT nasal spray Spray 2 sprays into both nostrils daily       lisinopril (PRINIVIL/ZESTRIL) 2.5 MG tablet Take 1 tablet (2.5 mg) by mouth daily 90 tablet 3     melatonin 5 MG tablet Take 1 tablet (5 mg) by mouth nightly as needed for sleep       metoprolol succinate (TOPROL-XL) 25 MG 24 hr tablet Take 1 tablet (25 mg) by mouth daily 90 tablet 3     nitroglycerin (NITROSTAT) 0.4 MG SL  tablet Place 1 tablet (0.4 mg) under the tongue every 5 minutes as needed for chest pain for chest pain 25 tablet 3     ranitidine (ZANTAC) 150 MG tablet Take 150 mg by mouth 2 times daily as needed for heartburn       Medications reviewed:  Medications reconciled to facility chart and changes were made to reflect current medications as identified as above med list. Below are the changes that were made:   Medications stopped since last EPIC medication reconciliation:   There are no discontinued medications.    Medications started since last [facility] medication reconciliation:  Orders Placed This Encounter   Medications     ranitidine (ZANTAC) 150 MG tablet     Sig: Take 150 mg by mouth 2 times daily as needed for heartburn       REVIEW OF SYSTEMS:  Limited secondary to cognitive impairment but today pt reports no new concerns    Physical Exam:  /65   Pulse 75   Temp 98.6  F (37  C)   Resp 16   Wt 46.7 kg (103 lb)   SpO2 98%   BMI 18.25 kg/m     GENERAL APPEARANCE:  Alert, in no distress, thin, cooperative  RESP:  respiratory effort and palpation of chest normal, lungs clear to auscultation , no respiratory distress  CV:  Palpation and auscultation of heart done , regular rate and rhythm, no murmur, rub, or gallop, no edema  M/S:   Gait and station normal  PSYCH:  insight and judgement impaired, memory impaired     Recent Labs:   Reviewed in records.     Assessment/Plan:  Late onset Alzheimer's disease with behavioral disturbance  Anxiety disorder due to medical condition  Appears to be adjusting to long-term care stay without undue anxiety.  No behaviors noted, other than frequent changing of his close throughout the day.    Moderate protein-calorie malnutrition (H)  Eating well, weight stable/trending up.  Stable    COPD, mild (H)  No new symptoms, on no routine controller medications. Stable.     Essential hypertension  Generally normotensive on current regimen, goal BP <150/90 to reduce risk of falls,  hypotension. The current medical regimen is effective;  continue present plan and medications.        Orders:  1. No new orders     Electronically signed by  ROXANA Obrien CNP      Sincerely,        ROXANA Obrien CNP

## 2019-01-22 NOTE — PROGRESS NOTES
[place] [facility]    Chief Complaint   Patient presents with     snf Acute       [place] Medical Record Number:  [id number]  Place of Service where encounter took place:  [facility]  ([facility]) [[id number]]    HPI:    Evin Melgar is a 84 year old  (6/1/1934), who is being seen today for an episodic care visit.  HPI information obtained from: facility chart records, facility staff, patient report and [facility] chart review.    Today's concern is:     Late onset Alzheimer's disease with behavioral disturbance  Anxiety disorder due to medical condition  Moderate protein-calorie malnutrition (H)  COPD, mild (H)  Essential hypertension     Patient is alert to self and surroundings, without complaints of new pain no chest pain, no dyspnea.    ALLERGIES: Patient has no known allergies.  Past Medical, Surgical, Family and Social History reviewed and updated in [facility].    Current Outpatient Medications   Medication Sig Dispense Refill     albuterol (PROAIR HFA) 108 (90 Base) MCG/ACT inhaler Inhale 2 puffs into the lungs every 4 hours as needed for shortness of breath / dyspnea Rinse mouthpiece weekly. 1 Inhaler 11     amLODIPine (NORVASC) 5 MG tablet Take 1 tablet (5 mg) by mouth daily 90 tablet 3     atorvastatin (LIPITOR) 20 MG tablet Take 1 tablet (20 mg) by mouth daily 90 tablet 3     clopidogrel (PLAVIX) 75 MG tablet Take 1 tablet (75 mg) by mouth daily 90 tablet 3     fluticasone (FLONASE) 50 MCG/ACT nasal spray Spray 2 sprays into both nostrils daily       lisinopril (PRINIVIL/ZESTRIL) 2.5 MG tablet Take 1 tablet (2.5 mg) by mouth daily 90 tablet 3     melatonin 5 MG tablet Take 1 tablet (5 mg) by mouth nightly as needed for sleep       metoprolol succinate (TOPROL-XL) 25 MG 24 hr tablet Take 1 tablet (25 mg) by mouth daily 90 tablet 3     nitroglycerin (NITROSTAT) 0.4 MG SL tablet Place 1 tablet (0.4 mg) under the tongue every 5 minutes as needed for chest pain for chest pain 25  tablet 3     ranitidine (ZANTAC) 150 MG tablet Take 150 mg by mouth 2 times daily as needed for heartburn       Medications reviewed:  Medications reconciled to facility chart and changes were made to reflect current medications as identified as above med list. Below are the changes that were made:   Medications stopped since last EPIC medication reconciliation:   There are no discontinued medications.    Medications started since last [facility] medication reconciliation:  Orders Placed This Encounter   Medications     ranitidine (ZANTAC) 150 MG tablet     Sig: Take 150 mg by mouth 2 times daily as needed for heartburn       REVIEW OF SYSTEMS:  Limited secondary to cognitive impairment but today pt reports no new concerns    Physical Exam:  /65   Pulse 75   Temp 98.6  F (37  C)   Resp 16   Wt 46.7 kg (103 lb)   SpO2 98%   BMI 18.25 kg/m    GENERAL APPEARANCE:  Alert, in no distress, thin, cooperative  RESP:  respiratory effort and palpation of chest normal, lungs clear to auscultation , no respiratory distress  CV:  Palpation and auscultation of heart done , regular rate and rhythm, no murmur, rub, or gallop, no edema  M/S:   Gait and station normal  PSYCH:  insight and judgement impaired, memory impaired     Recent Labs:   Reviewed in records.     Assessment/Plan:  Late onset Alzheimer's disease with behavioral disturbance  Anxiety disorder due to medical condition  Appears to be adjusting to long-term care stay without undue anxiety.  No behaviors noted, other than frequent changing of his close throughout the day.    Moderate protein-calorie malnutrition (H)  Eating well, weight stable/trending up.  Stable    COPD, mild (H)  No new symptoms, on no routine controller medications. Stable.     Essential hypertension  Generally normotensive on current regimen, goal BP <150/90 to reduce risk of falls, hypotension. The current medical regimen is effective;  continue present plan and medications.         Orders:  1. No new orders     Electronically signed by  ROXANA Obrien CNP

## 2019-02-03 NOTE — PROGRESS NOTES
[facility]  PRIMARY CARE PROVIDER AND CLINIC:  Katy Hidalgo [address] / [place] [address]  Chief Complaint   Patient presents with     Hospital F/U     [facility] Medical Record Number:  [id number]  Place of Service where encounter took place:  [facility]  ([facility]) [[id number]]    HPI:    Evin Melgar is a 84 year old  (6/1/1934),admitted to the above facility from Home.  Admitted to this facility for  rehab, medical management and nursing care.  HPI information obtained from: facility chart records, facility staff, patient report and [facility] chart review.      Pt with PMH significant with progressive dementia, recurrent falls, with progressive decline,  who previously lived with spouse, was wandering with some increased behaviors concern at home, hence admitted to this facility.    Current issues are:    - reports doing fine, denies wheezing but occasional cough.   - reports appetite, sleep and BM are fine.   - RN reports Temp at 99 'F, Resident denies sore throat, diarrhea, dysuria.     =========================================================  CODE STATUS/ADVANCE DIRECTIVES DISCUSSION:   DNR / DNI  Patient's living condition: lives with spouse    ALLERGIES:Patient has no known allergies.  PAST MEDICAL HISTORY:  has a past medical history of CAD (coronary artery disease) (9/4/2007), CKD (chronic kidney disease), stage III (H) (11/4/2008), GERD (gastroesophageal reflux disease) (11/4/2008), Hyperlipidaemia, Hypertension (12/13/2005), Inguinal hernia without mention of obstruction or gangrene, unilateral or unspecified, (not specified as recurrent), Memory deficit (3/2/2012), Microscopic hematuria (3/2/2012), Mild persistent asthma (12/13/2005), OAB (overactive bladder) (3/2/2012), Tubular adenoma (10/21/2008), and Unspecified essential hypertension. He also has no past medical history of Cancer (H), Obese, Sleep apnea, Thyroid disease, or Type 2 diabetes mellitus without  complications (H).  PAST SURGICAL HISTORY:  has a past surgical history that includes surgical history of - ; angiogram (10/07); and angioplasty (9/07).  FAMILY HISTORY: family history includes Cerebrovascular Disease in his father and mother; Diabetes in his brother and father; Heart Disease in his brother; Lipids in his mother.  SOCIAL HISTORY:  reports that he quit smoking about 70 years ago. he has never used smokeless tobacco. He reports that he drinks alcohol. He reports that he does not use drugs.    Post Discharge Medication Reconciliation Status: discharge medications reconciled and changed, per note/orders (see AVS).  Current Outpatient Medications   Medication Sig Dispense Refill     albuterol (PROAIR HFA) 108 (90 Base) MCG/ACT inhaler Inhale 2 puffs into the lungs every 4 hours as needed for shortness of breath / dyspnea Rinse mouthpiece weekly. 1 Inhaler 11     amLODIPine (NORVASC) 5 MG tablet Take 1 tablet (5 mg) by mouth daily 90 tablet 3     atorvastatin (LIPITOR) 20 MG tablet Take 1 tablet (20 mg) by mouth daily 90 tablet 3     clopidogrel (PLAVIX) 75 MG tablet Take 1 tablet (75 mg) by mouth daily 90 tablet 3     fluticasone (FLONASE) 50 MCG/ACT nasal spray Spray 2 sprays into both nostrils daily       lisinopril (PRINIVIL/ZESTRIL) 2.5 MG tablet Take 1 tablet (2.5 mg) by mouth daily 90 tablet 3     melatonin 5 MG tablet Take 1 tablet (5 mg) by mouth nightly as needed for sleep       metoprolol succinate (TOPROL-XL) 25 MG 24 hr tablet Take 1 tablet (25 mg) by mouth daily 90 tablet 3     nitroglycerin (NITROSTAT) 0.4 MG SL tablet Place 1 tablet (0.4 mg) under the tongue every 5 minutes as needed for chest pain for chest pain 25 tablet 3     ranitidine (ZANTAC) 150 MG tablet Take 150 mg by mouth 2 times daily as needed for heartburn         ROS:  Limited secondary to aphasia impairment but today pt reports- see HPI    Exam:  /75   Pulse 69   Temp 96.7  F (35.9  C)   Resp 14   Wt 48 kg (105 lb  12.8 oz)   SpO2 99%   BMI 18.74 kg/m    GENERAL APPEARANCE:  Alert, in no distress  ENT:  Mouth and posterior oropharynx normal, moist mucous membranes  EYES:  EOM, conjunctivae, lids, pupils and irises normal  RESP:  respiratory effort and palpation of chest normal, lungs clear to auscultation , no respiratory distress  CV:  Palpation and auscultation of heart done , regular rate and rhythm, no murmur, rub, or gallop  ABDOMEN:  normal bowel sounds, soft, nontender, no hepatosplenomegaly or other masses  M/S:   no joint deformity noted.   SKIN:  Inspection of skin and subcutaneous tissue baseline, Palpation of skin and subcutaneous tissue baseline  NEURO:   Cranial nerves 2-12 are normal tested and grossly at patient's baseline  PSYCH:  oriented to Person only., affect and mood normal    Lab/Diagnostic data:    CBC RESULTS:   Recent Labs   Lab Test 03/29/16  1137 01/02/15  0908 12/02/14  0831   WBC 7.1  --  6.3   RBC 4.69  --  4.74   HGB 13.5 13.2* 13.9   HCT 41.0  --  41.0   MCV 87  --  87   MCH 28.8  --  29.3   MCHC 32.9  --  33.9   RDW 12.4  --  12.3     --  209       Last Basic Metabolic Panel:  Recent Labs   Lab Test 11/28/18  0748 03/13/18  0801    139   POTASSIUM 3.0* 3.7   CHLORIDE 109 105   GERA 8.2* 8.7   CO2 25 27   BUN 24 18   CR 1.27* 1.26*   GLC 97 106*       Liver Function Studies -   Recent Labs   Lab Test 11/28/18  0748 03/13/18  0801  08/04/15  0746 12/02/14  0831   AST  --   --   --  23 19   ALT 20 19   < > 20 24    < > = values in this interval not displayed.       TSH   Date Value Ref Range Status   07/13/2016 1.57 0.40 - 4.00 mU/L Final   11/19/2010 2.61 0.4 - 5.0 mU/L Final       ASSESSMENT/PLAN:  -----------------------------------------  # Coronary artery disease involving native coronary artery of native heart without angina pectoris  # Essential hypertension  #Hyperlipidemia   - clinically at baseline.   - BP on the soft side for this age group. Keep SBP > 130 mmHg. Consider  stopping norvasc, but keep lisinopril and toprol xl for now.   - on lipitor 20 mg, however, LDL is 20, recommend reducing lipitor to 10 mg, and repeat level in 6 month, if LDL still < 40, stop it.     # COPD, mild (H)  # Mild persistent asthma without complication  - at baseline.   - consider changing Flonase to prn, most patient with rhinitis have seasonal type. Chronic nasal steroid can cause systemic effect along with local AE including dry nasal mucosa and septal perforation.     Moderate protein-calorie malnutrition (H)  - Body mass index is 18.74 kg/m .   - consider starting MV and protein supplement.     Mild Hypokalemia:  - Recommending rechecking level, and manage accordingly.     Frailty:  - Significant  Deficits requiring NH placement. Requiring extensive assistance from nursing. Up for meals only o/w spends the day resting in bed    Late onset Alzheimer's disease with behavioral disturbance  - behavior is better now.   - Continue to anticipate needs. Chronic condition, ongoing decline expected.   -  Continue to provide redirection and reassurance as needed. Maintain safe living situation with goals focused on comfort.    Orders:  1. See above, otherwise, continue the rest of the current POC.     Total time spent with patient visit at the skilled nursing facility was 45 min including patient visit, review of past records and NP and nursing's reports. Greater than 50% of total time spent with counseling and coordinating care due to above multiple comorbidities and recommendations.     Electronically signed by:  Blanco Browning MD

## 2019-02-04 NOTE — LETTER
2/4/2019        RE: Evin Melgar  [address]  [address]  [place] [address]        [facility]  PRIMARY CARE PROVIDER AND CLINIC:  Katy Hidalgo [address] [place] [address] / [place] [address]  Chief Complaint   Patient presents with     Hospital F/U     [facility] Medical Record Number:  [id number]  Place of Service where encounter took place:  [facility]  ([facility]GS) [[id number]]    HPI:    Evin Melgar is a 84 year old  (6/1/1934),admitted to the above facility from Home.  Admitted to this facility for  rehab, medical management and nursing care.  HPI information obtained from: facility chart records, facility staff, patient report and [facility] chart review.      Pt with PMH significant with progressive dementia, recurrent falls, with progressive decline,  who previously lived with spouse, was wandering with some increased behaviors concern at home, hence admitted to this facility.    Current issues are:    - reports doing fine, denies wheezing but occasional cough.   - reports appetite, sleep and BM are fine.   - RN reports Temp at 99 'F, Resident denies sore throat, diarrhea, dysuria.     =========================================================  CODE STATUS/ADVANCE DIRECTIVES DISCUSSION:   DNR / DNI  Patient's living condition: lives with spouse    ALLERGIES:Patient has no known allergies.  PAST MEDICAL HISTORY:  has a past medical history of CAD (coronary artery disease) (9/4/2007), CKD (chronic kidney disease), stage III (H) (11/4/2008), GERD (gastroesophageal reflux disease) (11/4/2008), Hyperlipidaemia, Hypertension (12/13/2005), Inguinal hernia without mention of obstruction or gangrene, unilateral or unspecified, (not specified as recurrent), Memory deficit (3/2/2012), Microscopic hematuria (3/2/2012), Mild persistent asthma (12/13/2005), OAB (overactive bladder) (3/2/2012), Tubular adenoma (10/21/2008), and Unspecified essential hypertension. He also has no past  medical history of Cancer (H), Obese, Sleep apnea, Thyroid disease, or Type 2 diabetes mellitus without complications (H).  PAST SURGICAL HISTORY:  has a past surgical history that includes surgical history of - ; angiogram (10/07); and angioplasty (9/07).  FAMILY HISTORY: family history includes Cerebrovascular Disease in his father and mother; Diabetes in his brother and father; Heart Disease in his brother; Lipids in his mother.  SOCIAL HISTORY:  reports that he quit smoking about 70 years ago. he has never used smokeless tobacco. He reports that he drinks alcohol. He reports that he does not use drugs.    Post Discharge Medication Reconciliation Status: discharge medications reconciled and changed, per note/orders (see AVS).  Current Outpatient Medications   Medication Sig Dispense Refill     albuterol (PROAIR HFA) 108 (90 Base) MCG/ACT inhaler Inhale 2 puffs into the lungs every 4 hours as needed for shortness of breath / dyspnea Rinse mouthpiece weekly. 1 Inhaler 11     amLODIPine (NORVASC) 5 MG tablet Take 1 tablet (5 mg) by mouth daily 90 tablet 3     atorvastatin (LIPITOR) 20 MG tablet Take 1 tablet (20 mg) by mouth daily 90 tablet 3     clopidogrel (PLAVIX) 75 MG tablet Take 1 tablet (75 mg) by mouth daily 90 tablet 3     fluticasone (FLONASE) 50 MCG/ACT nasal spray Spray 2 sprays into both nostrils daily       lisinopril (PRINIVIL/ZESTRIL) 2.5 MG tablet Take 1 tablet (2.5 mg) by mouth daily 90 tablet 3     melatonin 5 MG tablet Take 1 tablet (5 mg) by mouth nightly as needed for sleep       metoprolol succinate (TOPROL-XL) 25 MG 24 hr tablet Take 1 tablet (25 mg) by mouth daily 90 tablet 3     nitroglycerin (NITROSTAT) 0.4 MG SL tablet Place 1 tablet (0.4 mg) under the tongue every 5 minutes as needed for chest pain for chest pain 25 tablet 3     ranitidine (ZANTAC) 150 MG tablet Take 150 mg by mouth 2 times daily as needed for heartburn         ROS:  Limited secondary to aphasia impairment but today pt  reports- see HPI    Exam:  /75   Pulse 69   Temp 96.7  F (35.9  C)   Resp 14   Wt 48 kg (105 lb 12.8 oz)   SpO2 99%   BMI 18.74 kg/m     GENERAL APPEARANCE:  Alert, in no distress  ENT:  Mouth and posterior oropharynx normal, moist mucous membranes  EYES:  EOM, conjunctivae, lids, pupils and irises normal  RESP:  respiratory effort and palpation of chest normal, lungs clear to auscultation , no respiratory distress  CV:  Palpation and auscultation of heart done , regular rate and rhythm, no murmur, rub, or gallop  ABDOMEN:  normal bowel sounds, soft, nontender, no hepatosplenomegaly or other masses  M/S:   no joint deformity noted.   SKIN:  Inspection of skin and subcutaneous tissue baseline, Palpation of skin and subcutaneous tissue baseline  NEURO:   Cranial nerves 2-12 are normal tested and grossly at patient's baseline  PSYCH:  oriented to Person only., affect and mood normal    Lab/Diagnostic data:    CBC RESULTS:   Recent Labs   Lab Test 03/29/16  1137 01/02/15  0908 12/02/14  0831   WBC 7.1  --  6.3   RBC 4.69  --  4.74   HGB 13.5 13.2* 13.9   HCT 41.0  --  41.0   MCV 87  --  87   MCH 28.8  --  29.3   MCHC 32.9  --  33.9   RDW 12.4  --  12.3     --  209       Last Basic Metabolic Panel:  Recent Labs   Lab Test 11/28/18  0748 03/13/18  0801    139   POTASSIUM 3.0* 3.7   CHLORIDE 109 105   GERA 8.2* 8.7   CO2 25 27   BUN 24 18   CR 1.27* 1.26*   GLC 97 106*       Liver Function Studies -   Recent Labs   Lab Test 11/28/18  0748 03/13/18  0801  08/04/15  0746 12/02/14  0831   AST  --   --   --  23 19   ALT 20 19   < > 20 24    < > = values in this interval not displayed.       TSH   Date Value Ref Range Status   07/13/2016 1.57 0.40 - 4.00 mU/L Final   11/19/2010 2.61 0.4 - 5.0 mU/L Final       ASSESSMENT/PLAN:  -----------------------------------------  # Coronary artery disease involving native coronary artery of native heart without angina pectoris  # Essential  hypertension  #Hyperlipidemia   - clinically at baseline.   - BP on the soft side for this age group. Keep SBP > 130 mmHg. Consider stopping norvasc, but keep lisinopril and toprol xl for now.   - on lipitor 20 mg, however, LDL is 20, recommend reducing lipitor to 10 mg, and repeat level in 6 month, if LDL still < 40, stop it.     # COPD, mild (H)  # Mild persistent asthma without complication  - at baseline.   - consider changing Flonase to prn, most patient with rhinitis have seasonal type. Chronic nasal steroid can cause systemic effect along with local AE including dry nasal mucosa and septal perforation.     Moderate protein-calorie malnutrition (H)  - Body mass index is 18.74 kg/m .   - consider starting MV and protein supplement.     Mild Hypokalemia:  - Recommending rechecking level, and manage accordingly.     Frailty:  - Significant  Deficits requiring NH placement. Requiring extensive assistance from nursing. Up for meals only o/w spends the day resting in bed    Late onset Alzheimer's disease with behavioral disturbance  - behavior is better now.   - Continue to anticipate needs. Chronic condition, ongoing decline expected.   -  Continue to provide redirection and reassurance as needed. Maintain safe living situation with goals focused on comfort.    Orders:  1. See above, otherwise, continue the rest of the current POC.     Total time spent with patient visit at the skilled nursing facility was 45 min including patient visit, review of past records and NP and nursing's reports. Greater than 50% of total time spent with counseling and coordinating care due to above multiple comorbidities and recommendations.     Electronically signed by:  Blanco Browning MD      Sincerely,        Blanco Browning MD

## 2019-03-12 NOTE — LETTER
3/12/2019        RE: Evin Melgar  [facility]  [address] [facility] [address]        [facility]  Chief Complaint   Patient presents with     USP Regulatory     [facility] Medical Record Number:  [id number]  Place of Service where encounter took place:  [facility]  ([facility]GS) [[id number]]      HPI:    Evin Melgar  is 84 year old (6/1/1934), who is being seen today for a federally mandated E/M visit.  HPI information obtained from: facility chart records, facility staff, patient report and [facility] chart review.     The health plan new enrollment has happened. I have reviewed the  MDS, the preventative needs,  and facility care plan. The level of care is appropriate. I have reviewed the code status/advanced directives.     Today's concerns are:     Essential hypertension  Hyperlipidemia LDL goal <100  Seasonal allergic rhinitis, unspecified trigger  Late onset Alzheimer's disease with behavioral disturbance  Moderate protein-calorie malnutrition (H)  Mild persistent asthma without complication   Joao reports he is doing well without difficulty. He feels he is adjusting, but still would like to carry his inhaler along with him.  He reports no other new concerns.     ALLERGIES:Patient has no known allergies.  PAST MEDICAL HISTORY:   has a past medical history of CAD (coronary artery disease) (9/4/2007), CKD (chronic kidney disease), stage III (H) (11/4/2008), GERD (gastroesophageal reflux disease) (11/4/2008), Hyperlipidaemia, Hypertension (12/13/2005), Inguinal hernia without mention of obstruction or gangrene, unilateral or unspecified, (not specified as recurrent), Memory deficit (3/2/2012), Microscopic hematuria (3/2/2012), Mild persistent asthma (12/13/2005), OAB (overactive bladder) (3/2/2012), Tubular adenoma (10/21/2008), and Unspecified essential hypertension. He also has no past medical history of Cancer (H), Obese, Sleep apnea, Thyroid  disease, or Type 2 diabetes mellitus without complications (H).  PAST SURGICAL HISTORY:   has a past surgical history that includes surgical history of - ; angiogram (10/07); and angioplasty (9/07).  FAMILY HISTORY: family history includes Cerebrovascular Disease in his father and mother; Diabetes in his brother and father; Heart Disease in his brother; Lipids in his mother.  SOCIAL HISTORY:  reports that he quit smoking about 70 years ago. he has never used smokeless tobacco. He reports that he drinks alcohol. He reports that he does not use drugs.    MEDICATIONS:  Current Outpatient Medications   Medication Sig Dispense Refill     albuterol (PROAIR HFA) 108 (90 Base) MCG/ACT inhaler Inhale 2 puffs into the lungs every 4 hours as needed for shortness of breath / dyspnea Rinse mouthpiece weekly. 1 Inhaler 11     clopidogrel (PLAVIX) 75 MG tablet Take 1 tablet (75 mg) by mouth daily 90 tablet 3     lisinopril (PRINIVIL/ZESTRIL) 2.5 MG tablet Take 1 tablet (2.5 mg) by mouth daily 90 tablet 3     melatonin 5 MG tablet Take 1 tablet (5 mg) by mouth nightly as needed for sleep       metoprolol succinate (TOPROL-XL) 25 MG 24 hr tablet Take 1 tablet (25 mg) by mouth daily 90 tablet 3     Case Management:  I have reviewed the care plan and MDS and do agree with the plan. Patient's desire to return to the community is present, but is not able due to care needs . Information reviewed:  Medications, vital signs, orders, and nursing notes.    ROS:  4 point ROS including Respiratory, CV, GI and , other than that noted in the HPI,  is negative    Vitals:  /69   Pulse 71   Temp 97.6  F (36.4  C)   Resp 20   Wt 48.4 kg (106 lb 9.6 oz)   SpO2 97%   BMI 18.88 kg/m     Body mass index is 18.88 kg/m .  Exam:  GENERAL APPEARANCE:  Alert, in no distress  HEAD:  Normal, normocephalic, atraumatic  EYE EXAM: normal external eye, conjunctiva, lids, CORAZON  NECK EXAM: supple, no JVD  CHEST/RESP:  respiratory effort normal, lung  sounds CTA , no respiratory distress  CV:  Rate reg, rhythm reg, no murmur, no peripheral edema   M/S:   extremities normal, gait normal-without device, normal muscle tone, and range of motion normal   SKIN EXAM: CDI  NEUROLOGIC EXAM: Normal gross motor movement, tone and coordination. No tremor. Cranial nerves 2-12 are normal tested and grossly at patient's baseline  PSYCH:  Alert and oriented to self and surroundings, affect pleasant , judgement impaired by cognitive losses      Lab/Diagnostic data:   Recent labs in [facility] reviewed by me today.     ASSESSMENT/PLAN  Essential hypertension  Patient on amlodipine, lisinopril, metoprolol.  BP trending low normal and in this age range, goal BP <150/90.  Will discontinue amlodipine and monitor. Will check BMP with medication changes.   BP Readings from Last 6 Encounters:   03/12/19 111/69   02/03/19 129/75   01/22/19 124/65   01/15/19 113/54   01/08/19 112/76   12/03/18 123/64          Hyperlipidemia LDL goal <100  Patient on atorvastatin, last lipid panel about 1 year ago well within normal limits. Patient at advanced age, will decrease statin and monitor FLP in about 6 months.     Seasonal allergic rhinitis, unspecified trigger  On chronic flonase nasal spray without symptoms of rhinitis.  Will decrease to prn and monitor for return of symptoms.     Late onset Alzheimer's disease with behavioral disturbance  Stable, alert, able to make needs known. Does occasionally have exit seeking behaviors but easily redirected.  Check hemoglobin    Moderate protein-calorie malnutrition (H)  Patient is gaining weight slowly, enjoys his food.  Family declines need for MVI and protein supplement. Check TSH     Mild persistent asthma without complication  Stable, without exacerbation. No dyspnea. The current medical regimen is effective;  continue present plan and medications.       transcribed by : Twin Hidalgo  1. discontinue Amlodipine  2. discontinue Atorvastatin 20  mg daily   3. Atorvastatin 10 mg PO every day Dx: Hyperlipidemia  4. discontinue scheduled Flonase  5. Flonase nasal spray 1 spray each nostril every day PRN Dx: Allergic rhinitis  6. Lab draw next lab day for routine   -BMP Dx: HTN  -Hgb, TSH, B12 Dx: Dementia      Electronically signed by:  ROXANA Obrien CNP            Sincerely,        ROXANA Obrien CNP

## 2019-03-12 NOTE — PROGRESS NOTES
[facility]  Chief Complaint   Patient presents with     correction Regulatory     [place] Medical Record Number:  [id number]  Place of Service where encounter took place:  [facility]  ([facility]) [[id number]]      HPI:    Evin Melgar  is 84 year old (6/1/1934), who is being seen today for a federally mandated E/M visit.  HPI information obtained from: facility chart records, facility staff, patient report and [place] Epic chart review.     The health plan new enrollment has happened. I have reviewed the  MDS, the preventative needs,  and facility care plan. The level of care is appropriate. I have reviewed the code status/advanced directives.     Today's concerns are:     Essential hypertension  Hyperlipidemia LDL goal <100  Seasonal allergic rhinitis, unspecified trigger  Late onset Alzheimer's disease with behavioral disturbance  Moderate protein-calorie malnutrition (H)  Mild persistent asthma without complication   Joao reports he is doing well without difficulty. He feels he is adjusting, but still would like to carry his inhaler along with him.  He reports no other new concerns.     ALLERGIES:Patient has no known allergies.  PAST MEDICAL HISTORY:   has a past medical history of CAD (coronary artery disease) (9/4/2007), CKD (chronic kidney disease), stage III (H) (11/4/2008), GERD (gastroesophageal reflux disease) (11/4/2008), Hyperlipidaemia, Hypertension (12/13/2005), Inguinal hernia without mention of obstruction or gangrene, unilateral or unspecified, (not specified as recurrent), Memory deficit (3/2/2012), Microscopic hematuria (3/2/2012), Mild persistent asthma (12/13/2005), OAB (overactive bladder) (3/2/2012), Tubular adenoma (10/21/2008), and Unspecified essential hypertension. He also has no past medical history of Cancer (H), Obese, Sleep apnea, Thyroid disease, or Type 2 diabetes mellitus without complications (H).  PAST SURGICAL HISTORY:   has a past surgical history  that includes surgical history of - ; angiogram (10/07); and angioplasty (9/07).  FAMILY HISTORY: family history includes Cerebrovascular Disease in his father and mother; Diabetes in his brother and father; Heart Disease in his brother; Lipids in his mother.  SOCIAL HISTORY:  reports that he quit smoking about 70 years ago. he has never used smokeless tobacco. He reports that he drinks alcohol. He reports that he does not use drugs.    MEDICATIONS:  Current Outpatient Medications   Medication Sig Dispense Refill     albuterol (PROAIR HFA) 108 (90 Base) MCG/ACT inhaler Inhale 2 puffs into the lungs every 4 hours as needed for shortness of breath / dyspnea Rinse mouthpiece weekly. 1 Inhaler 11     clopidogrel (PLAVIX) 75 MG tablet Take 1 tablet (75 mg) by mouth daily 90 tablet 3     lisinopril (PRINIVIL/ZESTRIL) 2.5 MG tablet Take 1 tablet (2.5 mg) by mouth daily 90 tablet 3     melatonin 5 MG tablet Take 1 tablet (5 mg) by mouth nightly as needed for sleep       metoprolol succinate (TOPROL-XL) 25 MG 24 hr tablet Take 1 tablet (25 mg) by mouth daily 90 tablet 3     Case Management:  I have reviewed the care plan and MDS and do agree with the plan. Patient's desire to return to the community is present, but is not able due to care needs . Information reviewed:  Medications, vital signs, orders, and nursing notes.    ROS:  4 point ROS including Respiratory, CV, GI and , other than that noted in the HPI,  is negative    Vitals:  /69   Pulse 71   Temp 97.6  F (36.4  C)   Resp 20   Wt 48.4 kg (106 lb 9.6 oz)   SpO2 97%   BMI 18.88 kg/m    Body mass index is 18.88 kg/m .  Exam:  GENERAL APPEARANCE:  Alert, in no distress  HEAD:  Normal, normocephalic, atraumatic  EYE EXAM: normal external eye, conjunctiva, lids, CORAZON  NECK EXAM: supple, no JVD  CHEST/RESP:  respiratory effort normal, lung sounds CTA , no respiratory distress  CV:  Rate reg, rhythm reg, no murmur, no peripheral edema   M/S:   extremities  normal, gait normal-without device, normal muscle tone, and range of motion normal   SKIN EXAM: CDI  NEUROLOGIC EXAM: Normal gross motor movement, tone and coordination. No tremor. Cranial nerves 2-12 are normal tested and grossly at patient's baseline  PSYCH:  Alert and oriented to self and surroundings, affect pleasant , judgement impaired by cognitive losses      Lab/Diagnostic data:   Recent labs in [facility] reviewed by me today.     ASSESSMENT/PLAN  Essential hypertension  Patient on amlodipine, lisinopril, metoprolol.  BP trending low normal and in this age range, goal BP <150/90.  Will discontinue amlodipine and monitor. Will check BMP with medication changes.   BP Readings from Last 6 Encounters:   03/12/19 111/69   02/03/19 129/75   01/22/19 124/65   01/15/19 113/54   01/08/19 112/76   12/03/18 123/64          Hyperlipidemia LDL goal <100  Patient on atorvastatin, last lipid panel about 1 year ago well within normal limits. Patient at advanced age, will decrease statin and monitor FLP in about 6 months.     Seasonal allergic rhinitis, unspecified trigger  On chronic flonase nasal spray without symptoms of rhinitis.  Will decrease to prn and monitor for return of symptoms.     Late onset Alzheimer's disease with behavioral disturbance  Stable, alert, able to make needs known. Does occasionally have exit seeking behaviors but easily redirected.  Check hemoglobin    Moderate protein-calorie malnutrition (H)  Patient is gaining weight slowly, enjoys his food.  Family declines need for MVI and protein supplement. Check TSH     Mild persistent asthma without complication  Stable, without exacerbation. No dyspnea. The current medical regimen is effective;  continue present plan and medications.       transcribed by : Twin Hidalgo  1. discontinue Amlodipine  2. discontinue Atorvastatin 20 mg daily   3. Atorvastatin 10 mg PO every day Dx: Hyperlipidemia  4. discontinue scheduled Flonase  5. Flonase nasal  spray 1 spray each nostril every day PRN Dx: Allergic rhinitis  6. Lab draw next lab day for routine   -BMP Dx: HTN  -Hgb, TSH, B12 Dx: Dementia      Electronically signed by:  ROXANA Obrien CNP

## 2019-04-11 NOTE — PROGRESS NOTES
[place] [facility]  Chief Complaint   Patient presents with     detention Regulatory     90 day     [place] Medical Record Number:  [id number]  Place of Service where encounter took place:  [facility]  ([facility]) [[id number]]      HPI:    Evin Melgar  is 84 year old (6/1/1934), who is being seen today for a federally mandated E/M visit.  HPI information obtained from: facility staff, patient report and [facility] chart review.     Today's concerns are:   - Resident seen and examined.   - RN reports  Resident fell a couple of days ago, noted to have elevated BP, on call provider ordered one liter bolus normal saline. Today BP is 137/70, continued to have generalized weakness, refuses po intake, spilled medicine this am. Had Temp 101.4 yesterday.   - Reports feeling tired, and has cough but unsure of any phlegm.    - Denies runny or stuffy nose.   - Reports sleep, and BM are fine.   -----------------------------------------------------------  - Past Medical, social, family histories, medications, and allergies reviewed and updated  - Medications reviewed: in the chart and EHR.   - Case Management:   I have reviewed the care plan and MDS and do agree with the plan. Patient's desire to return to the community is not present.  Information reviewed:  Medications, vital signs, orders, and nursing notes.    MEDICATIONS:  Current Outpatient Medications   Medication Sig Dispense Refill     acetaminophen (TYLENOL) 325 MG tablet Take 2 tablets (650 mg) by mouth every 4 hours as needed for mild pain       albuterol (PROAIR HFA) 108 (90 Base) MCG/ACT inhaler Inhale 2 puffs into the lungs every 4 hours as needed for shortness of breath / dyspnea Rinse mouthpiece weekly. 1 Inhaler 11     atorvastatin (LIPITOR) 10 MG tablet Take 1 tablet (10 mg) by mouth daily       clopidogrel (PLAVIX) 75 MG tablet Take 1 tablet (75 mg) by mouth daily 90 tablet 3     fluticasone (FLONASE) 50 MCG/ACT nasal spray Spray 1  spray into both nostrils daily as needed for rhinitis or allergies       lisinopril (PRINIVIL/ZESTRIL) 2.5 MG tablet Take 1 tablet (2.5 mg) by mouth daily 90 tablet 3     melatonin 5 MG tablet Take 1 tablet (5 mg) by mouth nightly as needed for sleep       metoprolol succinate (TOPROL-XL) 25 MG 24 hr tablet Take 1 tablet (25 mg) by mouth daily 90 tablet 3     ROS:  Limited secondary to cognitive impairment but today pt reports- see HPI    Vitals:  BP 91/57   Pulse 74   Temp 98.6  F (37  C)   Resp 18   Wt 48.7 kg (107 lb 6.4 oz)   SpO2 100%   BMI 19.03 kg/m    Body mass index is 19.03 kg/m .  Exam:  GENERAL: tired looking, lying in the bed, cooperative   HEENT: conjunctivae normal, dry oral mucosa. No palpable head and neck lymph node  RESP:  lungs clear to auscultation , no respiratory distress, no wheezing or coarse sounds  CV:  S1S2 nl, regular rate and rhythm, no murmur, rub, or gallop  ABDOMEN:  normal bowel sounds, soft, nontender, no hepatosplenomegaly or other masses  M/S:   no joint deformity noted.   SKIN:  no rash noted  NEURO:   no NFD appreciated on observation.   PSYCH:  oriented to Person only., affect and mood normal    Lab/Diagnostic data:   Recent labs in [facility] reviewed by me today.     ASSESSMENT/PLAN  # COPD, mild (H)  # Mild persistent asthma without complication  # Respiratory Infection, URTI vs LRTI    -Lab pertinent for leukocytosis with left shift, CXR pending performance.   - will r/o influenza infection.   - Had one gram of Rocephin by on call provider, will follow on CXR, if pna,   - may use spacer for albuterol inhaler for optimal result.     # Coronary artery disease involving native coronary artery of native heart without angina pectoris  # Essential hypertension  #Hyperlipidemia   - clinically at baseline.   - BP on the soft side for this frail elderly with limited life expectancy. HR around 70's  - consider tapering down Toprol xl, QOD for one week then stop. Monitor HR daily q  shift for 5 days.   - continue lipitor.   - on plavix indefinitely per cardiology.    - at baseline.      Moderate protein-calorie malnutrition (H)  - Body mass index is 19.03 kg/m . from 8.74 kg/m .  In frail elderly keep BMI b/lw 25-35 Kg(m2).   - level below 22 in frail elderly may increase mortality.       Frailty:  - Significant  Deficits requiring NH placement. Requiring extensive assistance from nursing. Up for meals only o/w spends the day resting in bed     Late onset Alzheimer's disease without behavioral disturbance  - Continue to anticipate needs. Chronic condition, ongoing decline expected.   -  Continue to provide redirection and reassurance as needed. Maintain safe living situation with goals focused on comfort.      Total time spent with patient visit at the skilled nursing facility was 35 min including patient visit, review of past records and discussing with nursing staff, reviewing NP's note. Greater than 50% of total time spent with coordinating care due to above ongoing medical conditions, and recommendations.       Electronically signed by:  Blanco Browning MD

## 2019-04-12 NOTE — TELEPHONE ENCOUNTER
Staff calling today noting recent outbreak of influenza in facility with mass tx. Patient with increased lethargy, ongoing fever up to 102.9 in the past day, weakness, decreased PO intake. Fall last evening. They have been keeping him in bed and encouraging PO fluids.      STAT CBC BMP    NaCl 0.9% at 75cc/hr x1L    Dr. Alissa Callaway, APRN, [facility]  Phone: [phone]  Fax: [phone]

## 2019-04-14 NOTE — TELEPHONE ENCOUNTER
Patient with congestion, fever. WBC last week 12.   Lab Results   Component Value Date    WBC 7.1 03/29/2016    WBC 6.3 12/02/2014      PLAN  CXR 2 views  Rocephin 1 gm IM now  CBC on 4/15    Electronically signed by ROXANA Rae, GNP

## 2019-04-15 NOTE — LETTER
4/15/2019        RE: Evin Melgar  [address]  [address] [facility] [address]        [facility]  Chief Complaint   Patient presents with     assisted Regulatory     90 day     [place] Medical Record Number:  [id number]  Place of Service where encounter took place:  [facility]  ([facility]GS) [[id number]]      HPI:    Evin Melgar  is 84 year old (6/1/1934), who is being seen today for a federally mandated E/M visit.  HPI information obtained from: facility staff, patient report and [facility] chart review.     Today's concerns are:   - Resident seen and examined.   - RN reports  Resident fell a couple of days ago, noted to have elevated BP, on call provider ordered one liter bolus normal saline. Today BP is 137/70, continued to have generalized weakness, refuses po intake, spilled medicine this am. Had Temp 101.4 yesterday.   - Reports feeling tired, and has cough but unsure of any phlegm.    - Denies runny or stuffy nose.   - Reports sleep, and BM are fine.   -----------------------------------------------------------  - Past Medical, social, family histories, medications, and allergies reviewed and updated  - Medications reviewed: in the chart and EHR.   - Case Management:   I have reviewed the care plan and MDS and do agree with the plan. Patient's desire to return to the community is not present.  Information reviewed:  Medications, vital signs, orders, and nursing notes.    MEDICATIONS:  Current Outpatient Medications   Medication Sig Dispense Refill     acetaminophen (TYLENOL) 325 MG tablet Take 2 tablets (650 mg) by mouth every 4 hours as needed for mild pain       albuterol (PROAIR HFA) 108 (90 Base) MCG/ACT inhaler Inhale 2 puffs into the lungs every 4 hours as needed for shortness of breath / dyspnea Rinse mouthpiece weekly. 1 Inhaler 11     atorvastatin (LIPITOR) 10 MG tablet Take 1 tablet (10 mg) by mouth daily       clopidogrel (PLAVIX) 75  MG tablet Take 1 tablet (75 mg) by mouth daily 90 tablet 3     fluticasone (FLONASE) 50 MCG/ACT nasal spray Spray 1 spray into both nostrils daily as needed for rhinitis or allergies       lisinopril (PRINIVIL/ZESTRIL) 2.5 MG tablet Take 1 tablet (2.5 mg) by mouth daily 90 tablet 3     melatonin 5 MG tablet Take 1 tablet (5 mg) by mouth nightly as needed for sleep       metoprolol succinate (TOPROL-XL) 25 MG 24 hr tablet Take 1 tablet (25 mg) by mouth daily 90 tablet 3     ROS:  Limited secondary to cognitive impairment but today pt reports- see HPI    Vitals:  BP 91/57   Pulse 74   Temp 98.6  F (37  C)   Resp 18   Wt 48.7 kg (107 lb 6.4 oz)   SpO2 100%   BMI 19.03 kg/m     Body mass index is 19.03 kg/m .  Exam:  GENERAL: tired looking, lying in the bed, cooperative   HEENT: conjunctivae normal, dry oral mucosa. No palpable head and neck lymph node  RESP:  lungs clear to auscultation , no respiratory distress, no wheezing or coarse sounds  CV:   S1S2 nl, regular rate and rhythm, no murmur, rub, or gallop  ABDOMEN:  normal bowel sounds, soft, nontender, no hepatosplenomegaly or other masses  M/S:   no joint deformity noted.   SKIN:   no rash noted  NEURO:    no NFD appreciated on observation.   PSYCH:  oriented to Person only., affect and mood normal    Lab/Diagnostic data:   Recent labs in [facility] reviewed by me today.     ASSESSMENT/PLAN  # COPD, mild (H)  # Mild persistent asthma without complication  # Respiratory Infection, URTI vs LRTI    -Lab pertinent for leukocytosis with left shift, CXR pending performance.   - will r/o influenza infection.   - Had one gram of Rocephin by on call provider, will follow on CXR, if pna,   - may use spacer for albuterol inhaler for optimal result.     # Coronary artery disease involving native coronary artery of native heart without angina pectoris  # Essential hypertension  #Hyperlipidemia   - clinically at baseline.   - BP on the soft side for this frail elderly with  limited life expectancy. HR around 70's  - consider tapering down Toprol xl, QOD for one week then stop. Monitor HR daily q shift for 5 days.   - continue lipitor.   - on plavix indefinitely per cardiology.    - at baseline.      Moderate protein-calorie malnutrition (H)  - Body mass index is 19.03 kg/m . from 8.74 kg/m .  In frail elderly keep BMI b/lw 25-35 Kg(m2).   - level below 22 in frail elderly may increase mortality.       Frailty:  - Significant  Deficits requiring NH placement. Requiring extensive assistance from nursing. Up for meals only o/w spends the day resting in bed     Late onset Alzheimer's disease without behavioral disturbance  - Continue to anticipate needs. Chronic condition, ongoing decline expected.   -  Continue to provide redirection and reassurance as needed. Maintain safe living situation with goals focused on comfort.      Total time spent with patient visit at the skilled nursing facility was 35 min including patient visit, review of past records and discussing with nursing staff, reviewing NP's note. Greater than 50% of total time spent with coordinating care due to above ongoing medical conditions, and recommendations.       Electronically signed by:  Blanco Browning MD            Sincerely,        Blanco Browning MD

## 2019-04-22 NOTE — PROGRESS NOTES
This patient's medication list and chart were reviewed as part of the service provided by [facility].    Assessment/Recommendations:  1. (CAD/HTN):  BP goal <150/90mmHg reasonable in this elderly patient at risk of dizziness, hypotension, falls, tissue/cerebral hypoperfusion with too tight control.  Agree with recent MD recommendation to consider taper and d'c of Metoprolol ER.  Please consider reduction to Metoprolol ER 12.5mg once daily for one week and monitor HR, blood pressure.  If tolerates, consider d'c altogether.  I do not see a recent Hgb check, and pt on Plavix.  Please consider checking Hgb.  2. (Supplements):  Pt ambulatory, and may benefit from addition of vitamin D3 1000u daily for weakness/fall/fracture prevention/likely low in vitamin D.        Margarette Naylor, Pharm.D.,[facility]  Board Certified Geriatric Pharmacist  Medication Therapy Management Pharmacist  [phone]

## 2019-04-23 NOTE — LETTER
4/23/2019        RE: Evin Melgar  [facility]  [address] [facility] [address]        [facility]  [place] Medical Record Number:  [id number]  Place of Service where encounter took place:  [facility]  ([facility]) [[id number]]  Chief Complaint   Patient presents with     Nursing Home Acute       HPI:    Evin Melgar  is a 84 year old (6/1/1934), who is being seen today for an episodic care visit.  HPI information obtained from: facility chart records, facility staff, patient report and [facility] chart review. Today's concern is:     Late onset Alzheimer's disease with behavioral disturbance  Anxiety disorder due to medical condition  Essential hypertension  Pain  Diarrhea, unspecified type   Joao is recovering from significant URI, negative influenza.  He is weak, difficulty with ambulation.  He is also reported to have frequent diarrhea that is distressing to him, did also have this at home.        Past Medical and Surgical History reviewed in Epic today.    MEDICATIONS:  Current Outpatient Medications   Medication Sig Dispense Refill     acetaminophen (TYLENOL) 325 MG tablet Take 2 tablets (650 mg) by mouth every 4 hours as needed for mild pain       albuterol (PROAIR HFA) 108 (90 Base) MCG/ACT inhaler Inhale 2 puffs into the lungs every 4 hours as needed for shortness of breath / dyspnea Rinse mouthpiece weekly. 1 Inhaler 11     atorvastatin (LIPITOR) 10 MG tablet Take 1 tablet (10 mg) by mouth daily       clopidogrel (PLAVIX) 75 MG tablet Take 1 tablet (75 mg) by mouth daily 90 tablet 3     fluticasone (FLONASE) 50 MCG/ACT nasal spray Spray 1 spray into both nostrils daily as needed for rhinitis or allergies       lisinopril (PRINIVIL/ZESTRIL) 2.5 MG tablet Take 1 tablet (2.5 mg) by mouth daily 90 tablet 3     melatonin 5 MG tablet Take 1 tablet (5 mg) by mouth nightly as needed for sleep       metoprolol succinate (TOPROL-XL) 25 MG 24 hr tablet Take 1  tablet (25 mg) by mouth daily 90 tablet 3     REVIEW OF SYSTEMS:  Unobtainable secondary to cognitive impairment.     Objective:  /83   Pulse 65   Temp 98.2  F (36.8  C)   Resp 18   Wt 46.3 kg (102 lb)   SpO2 95%   BMI 18.07 kg/m     Exam:  GENERAL APPEARANCE:  Asleep, in no distress , thin, pale  CHEST/RESP:  respiratory effort normal, lung sounds CTA , no respiratory distress  CV:  Rate reg, rhythm reg, no murmur, no peripheral edema   PSYCH:  Alert and oriented to self and surroundings, affect pleasant , judgement impaired by cognitive losses      Labs:   Recent labs in [facility] reviewed by me today.     ASSESSMENT/PLAN:  Late onset Alzheimer's disease with behavioral disturbance  Anxiety disorder due to medical condition  Dementia with anxiety stable.     Essential hypertension  BP within normal limits on lisinopril    Pain  Does have some chronic pain, using prn tylenol 2-3 x per day consistently.  Will schedule.   - acetaminophen (TYLENOL) 325 MG tablet; Take 2 tablets (650 mg) by mouth 3 times daily. May also take 2 tablets (650 mg) 2 times daily as needed for mild pain.    Diarrhea, unspecified type  Frequent diarrhea is a lifelong pattern, start imodium for prn use.   - loperamide (IMODIUM A-D) 2 MG tablet; Take 1 tablet (2 mg) by mouth 4 times daily as needed for diarrhea    transcribed by : Twin Hidalgo  1. Tylenol 650 mg TID PO and tylenol 650 mg BID PRN PO Dx: Pain  2. Imodium 2 mg QID PO PRN Dx: Diarrhea       Electronically signed by:  ROXANA Obrien CNP               Sincerely,        ROXANA Obrien CNP

## 2019-04-23 NOTE — PROGRESS NOTES
[place] [facility]  [place] Medical Record Number:  [id number]  Place of Service where encounter took place:  [facility]  ([facility]) [[id number]]  Chief Complaint   Patient presents with     Nursing Home Acute       HPI:    Evin Melgar  is a 84 year old (6/1/1934), who is being seen today for an episodic care visit.  HPI information obtained from: facility chart records, facility staff, patient report and [facility] chart review. Today's concern is:     Late onset Alzheimer's disease with behavioral disturbance  Anxiety disorder due to medical condition  Essential hypertension  Pain  Diarrhea, unspecified type   Joao is recovering from significant URI, negative influenza.  He is weak, difficulty with ambulation.  He is also reported to have frequent diarrhea that is distressing to him, did also have this at home.        Past Medical and Surgical History reviewed in Epic today.    MEDICATIONS:  Current Outpatient Medications   Medication Sig Dispense Refill     acetaminophen (TYLENOL) 325 MG tablet Take 2 tablets (650 mg) by mouth every 4 hours as needed for mild pain       albuterol (PROAIR HFA) 108 (90 Base) MCG/ACT inhaler Inhale 2 puffs into the lungs every 4 hours as needed for shortness of breath / dyspnea Rinse mouthpiece weekly. 1 Inhaler 11     atorvastatin (LIPITOR) 10 MG tablet Take 1 tablet (10 mg) by mouth daily       clopidogrel (PLAVIX) 75 MG tablet Take 1 tablet (75 mg) by mouth daily 90 tablet 3     fluticasone (FLONASE) 50 MCG/ACT nasal spray Spray 1 spray into both nostrils daily as needed for rhinitis or allergies       lisinopril (PRINIVIL/ZESTRIL) 2.5 MG tablet Take 1 tablet (2.5 mg) by mouth daily 90 tablet 3     melatonin 5 MG tablet Take 1 tablet (5 mg) by mouth nightly as needed for sleep       metoprolol succinate (TOPROL-XL) 25 MG 24 hr tablet Take 1 tablet (25 mg) by mouth daily 90 tablet 3     REVIEW OF SYSTEMS:  Unobtainable secondary to cognitive impairment.      Objective:  /83   Pulse 65   Temp 98.2  F (36.8  C)   Resp 18   Wt 46.3 kg (102 lb)   SpO2 95%   BMI 18.07 kg/m    Exam:  GENERAL APPEARANCE:  Asleep, in no distress , thin, pale  CHEST/RESP:  respiratory effort normal, lung sounds CTA , no respiratory distress  CV:  Rate reg, rhythm reg, no murmur, no peripheral edema   PSYCH:  Alert and oriented to self and surroundings, affect pleasant , judgement impaired by cognitive losses      Labs:   Recent labs in [facility] reviewed by me today.     ASSESSMENT/PLAN:  Late onset Alzheimer's disease with behavioral disturbance  Anxiety disorder due to medical condition  Dementia with anxiety stable.     Essential hypertension  BP within normal limits on lisinopril    Pain  Does have some chronic pain, using prn tylenol 2-3 x per day consistently.  Will schedule.   - acetaminophen (TYLENOL) 325 MG tablet; Take 2 tablets (650 mg) by mouth 3 times daily. May also take 2 tablets (650 mg) 2 times daily as needed for mild pain.    Diarrhea, unspecified type  Frequent diarrhea is a lifelong pattern, start imodium for prn use.   - loperamide (IMODIUM A-D) 2 MG tablet; Take 1 tablet (2 mg) by mouth 4 times daily as needed for diarrhea    transcribed by : Twin Hidalgo  1. Tylenol 650 mg TID PO and tylenol 650 mg BID PRN PO Dx: Pain  2. Imodium 2 mg QID PO PRN Dx: Diarrhea       Electronically signed by:  ROXANA Obrien CNP

## 2019-04-29 NOTE — TELEPHONE ENCOUNTER
Prior Authorization Retail Medication Request    Medication/Dose: Acetainophen 325 mg   ICD code (if different than what is on RX):    Previously Tried and Failed:    Rationale:      Insurance Name:  Kyleigh PITT  Insurance ID:  [id number]      Pharmacy Information (if different than what is on RX)  Name:  [facility]  Phone:  [phone]

## 2019-05-02 NOTE — TELEPHONE ENCOUNTER
Central Prior Authorization Team   Phone: [phone]    PA Initiation    Medication: Acetainophen 325 mg  give 650 mg PO TID and give 650 mg PO BID PRN  Insurance Company: [employer] - Phone [phone] Fax [phone]  Pharmacy Filling the Rx: [facility] - [place], - [place], [place] - [address] [place] [address]  Filling Pharmacy Phone: [phone]  Filling Pharmacy Fax:    Start Date: 5/2/2019

## 2019-05-08 NOTE — TELEPHONE ENCOUNTER
Prior Authorization Not allowed  per Insurance - I left voicemail for pharmacy to try to bill straight Medicaid for OTC items. If they are still needing help, I left my direct phone number.         Medication: Acetainophen 325 mg  give 650 mg PO TID and give 650 mg PO BID PRN  Insurance Company: [employer] - Phone [phone] Fax [phone]  Expected CoPay:      Pharmacy Filling the Rx: [facility], - [place], [place] - [address] [place] [address]  Pharmacy Notified:    Patient Notified:

## 2019-06-18 NOTE — PROGRESS NOTES
[place] [facility]  Chief Complaint   Patient presents with     alf Regulatory     [place] Medical Record Number:  [id number]  Place of Service where encounter took place:  [facility]  ([facility]) [[id number]]    HPI:    Evin Melgar  is 85 year old (6/1/1934), who is being seen today for a federally mandated E/M visit.  HPI information obtained from: facility chart records, facility staff and patient report. Today's concerns are:     Late onset Alzheimer's disease with behavioral disturbance  Anxiety disorder due to medical condition  Pain  Hospice care patient   Evin reports no new concerns, is quite confused.  Nursing reports no new concerns.  Enrolled in hospice recently and goals are now focused on comfort .       ALLERGIES:Patient has no known allergies.  PAST MEDICAL HISTORY:   has a past medical history of CAD (coronary artery disease) (9/4/2007), CKD (chronic kidney disease), stage III (H) (11/4/2008), GERD (gastroesophageal reflux disease) (11/4/2008), Hyperlipidaemia, Hypertension (12/13/2005), Inguinal hernia without mention of obstruction or gangrene, unilateral or unspecified, (not specified as recurrent), Memory deficit (3/2/2012), Microscopic hematuria (3/2/2012), Mild persistent asthma (12/13/2005), OAB (overactive bladder) (3/2/2012), Tubular adenoma (10/21/2008), and Unspecified essential hypertension. He also has no past medical history of Cancer (H), Obese, Sleep apnea, Thyroid disease, or Type 2 diabetes mellitus without complications (H).  PAST SURGICAL HISTORY:   has a past surgical history that includes surgical history of - ; angiogram (10/07); and angioplasty (9/07).  FAMILY HISTORY: family history includes Cerebrovascular Disease in his father and mother; Diabetes in his brother and father; Heart Disease in his brother; Lipids in his mother.  SOCIAL HISTORY:  reports that he quit smoking about 70 years ago. He has never used smokeless tobacco. He reports  that he drinks alcohol. He reports that he does not use drugs.    MEDICATIONS:  Current Outpatient Medications   Medication Sig Dispense Refill     acetaminophen (TYLENOL) 325 MG tablet Take 2 tablets (650 mg) by mouth 3 times daily. May also take 2 tablets (650 mg) 2 times daily as needed for mild pain.       albuterol (PROAIR HFA) 108 (90 Base) MCG/ACT inhaler Inhale 2 puffs into the lungs every 4 hours as needed for shortness of breath / dyspnea Rinse mouthpiece weekly. 1 Inhaler 11     atorvastatin (LIPITOR) 10 MG tablet Take 1 tablet (10 mg) by mouth daily       clopidogrel (PLAVIX) 75 MG tablet Take 1 tablet (75 mg) by mouth daily 90 tablet 3     fluticasone (FLONASE) 50 MCG/ACT nasal spray Spray 1 spray into both nostrils daily as needed for rhinitis or allergies       lisinopril (PRINIVIL/ZESTRIL) 2.5 MG tablet Take 1 tablet (2.5 mg) by mouth daily 90 tablet 3     loperamide (IMODIUM A-D) 2 MG tablet Take 1 tablet (2 mg) by mouth 4 times daily as needed for diarrhea       melatonin 5 MG tablet Take 1 tablet (5 mg) by mouth nightly as needed for sleep       metoprolol succinate (TOPROL-XL) 25 MG 24 hr tablet Take 1 tablet (25 mg) by mouth daily 90 tablet 3       Case Management:  I have reviewed the care plan and MDS and do agree with the plan. Patient's desire to return to the community is present, but is not able due to care needs . Information reviewed:  Medications, vital signs, orders, and nursing notes.    ROS:  Unobtainable secondary to cognitive impairment.     Vitals:  /81   Pulse 74   Temp 97.8  F (36.6  C)   Resp 21   Wt 44.6 kg (98 lb 6.4 oz)   SpO2 96%   BMI 17.43 kg/m    Body mass index is 17.43 kg/m .  Exam:  GENERAL APPEARANCE:  Alert, in no acute distress   HEAD:  Normal, normocephalic, atraumatic  EYE EXAM: normal external eye, conjunctiva, lids, CORAZON  CHEST/RESP:  respiratory effort normal, lung sounds CTA , no respiratory distress  CV:  Rate reg, rhythm reg, no murmur, no  peripheral edema  M/S:   extremities normal, gait normal-without device  NEUROLOGIC EXAM: Normal gross motor movement, tone and coordination. No tremor. Cranial nerves 2-12 are normal tested and grossly at patient's baseline  PSYCH:  Alert and oriented to self and surroundings with forgetfulness     Lab/Diagnostic data:   Recent labs in [facility] reviewed by me today.     ASSESSMENT/PLAN  Late onset Alzheimer's disease with behavioral disturbance  Anxiety   Chronic condition, ongoing decline expected. Maintain safe living situation with goals focused on comfort.      Pain  Hospice care patient   Patient's medication regimen simplified for comfort focused approach.  Most medications discontinued. He appears comfortable today, on scheduled morphine for pain control.   - Menthol, Topical Analgesic, (BIOFREEZE) 4 % GEL; Externally apply topically 2 times daily  - morphine sulfate 20 MG/5ML SOLN; Take 5 mg by mouth 2 times daily  - morphine sulfate 20 MG/5ML SOLN; Take 0.25 mLs by mouth every 2 hours as needed (pain/SOB)  - atropine 1 % SOLN; Place 2 drops under the tongue every 2 hours as needed for secretions    Orders written by provider at facility and transcribed by : Twin Hidalgo  1. No new orders at this time      Electronically signed by:  ROXANA Obrien CNP

## 2019-06-19 NOTE — LETTER
6/19/2019        RE: Evin Melgar  [facility]  [address] [facility] [address]        [place] [facility]  Chief Complaint   Patient presents with     shelter Regulatory     [place] Medical Record Number:  [id number]  Place of Service where encounter took place:  [facility]  ([facility]) [[id number]]    HPI:    Evin Melgar  is 85 year old (6/1/1934), who is being seen today for a federally mandated E/M visit.  HPI information obtained from: facility chart records, facility staff and patient report. Today's concerns are:     Late onset Alzheimer's disease with behavioral disturbance  Anxiety disorder due to medical condition  Pain  Hospice care patient   Evin reports no new concerns, is quite confused.  Nursing reports no new concerns.  Enrolled in hospice recently and goals are now focused on comfort .       ALLERGIES:Patient has no known allergies.  PAST MEDICAL HISTORY:   has a past medical history of CAD (coronary artery disease) (9/4/2007), CKD (chronic kidney disease), stage III (H) (11/4/2008), GERD (gastroesophageal reflux disease) (11/4/2008), Hyperlipidaemia, Hypertension (12/13/2005), Inguinal hernia without mention of obstruction or gangrene, unilateral or unspecified, (not specified as recurrent), Memory deficit (3/2/2012), Microscopic hematuria (3/2/2012), Mild persistent asthma (12/13/2005), OAB (overactive bladder) (3/2/2012), Tubular adenoma (10/21/2008), and Unspecified essential hypertension. He also has no past medical history of Cancer (H), Obese, Sleep apnea, Thyroid disease, or Type 2 diabetes mellitus without complications (H).  PAST SURGICAL HISTORY:   has a past surgical history that includes surgical history of - ; angiogram (10/07); and angioplasty (9/07).  FAMILY HISTORY: family history includes Cerebrovascular Disease in his father and mother; Diabetes in his brother and father; Heart Disease in his brother; Lipids in his  mother.  SOCIAL HISTORY:  reports that he quit smoking about 70 years ago. He has never used smokeless tobacco. He reports that he drinks alcohol. He reports that he does not use drugs.    MEDICATIONS:  Current Outpatient Medications   Medication Sig Dispense Refill     acetaminophen (TYLENOL) 325 MG tablet Take 2 tablets (650 mg) by mouth 3 times daily. May also take 2 tablets (650 mg) 2 times daily as needed for mild pain.       albuterol (PROAIR HFA) 108 (90 Base) MCG/ACT inhaler Inhale 2 puffs into the lungs every 4 hours as needed for shortness of breath / dyspnea Rinse mouthpiece weekly. 1 Inhaler 11     atorvastatin (LIPITOR) 10 MG tablet Take 1 tablet (10 mg) by mouth daily       clopidogrel (PLAVIX) 75 MG tablet Take 1 tablet (75 mg) by mouth daily 90 tablet 3     fluticasone (FLONASE) 50 MCG/ACT nasal spray Spray 1 spray into both nostrils daily as needed for rhinitis or allergies       lisinopril (PRINIVIL/ZESTRIL) 2.5 MG tablet Take 1 tablet (2.5 mg) by mouth daily 90 tablet 3     loperamide (IMODIUM A-D) 2 MG tablet Take 1 tablet (2 mg) by mouth 4 times daily as needed for diarrhea       melatonin 5 MG tablet Take 1 tablet (5 mg) by mouth nightly as needed for sleep       metoprolol succinate (TOPROL-XL) 25 MG 24 hr tablet Take 1 tablet (25 mg) by mouth daily 90 tablet 3       Case Management:  I have reviewed the care plan and MDS and do agree with the plan. Patient's desire to return to the community is present, but is not able due to care needs . Information reviewed:  Medications, vital signs, orders, and nursing notes.    ROS:  Unobtainable secondary to cognitive impairment.     Vitals:  /81   Pulse 74   Temp 97.8  F (36.6  C)   Resp 21   Wt 44.6 kg (98 lb 6.4 oz)   SpO2 96%   BMI 17.43 kg/m     Body mass index is 17.43 kg/m .  Exam:  GENERAL APPEARANCE:  Alert, in no acute distress   HEAD:  Normal, normocephalic, atraumatic  EYE EXAM: normal external eye, conjunctiva, lids,  CORAZON  CHEST/RESP:  respiratory effort normal, lung sounds CTA , no respiratory distress  CV:  Rate reg, rhythm reg, no murmur, no peripheral edema  M/S:   extremities normal, gait normal-without device  NEUROLOGIC EXAM: Normal gross motor movement, tone and coordination. No tremor. Cranial nerves 2-12 are normal tested and grossly at patient's baseline  PSYCH:  Alert and oriented to self and surroundings with forgetfulness     Lab/Diagnostic data:   Recent labs in [facility] reviewed by me today.     ASSESSMENT/PLAN  Late onset Alzheimer's disease with behavioral disturbance  Anxiety   Chronic condition, ongoing decline expected. Maintain safe living situation with goals focused on comfort.      Pain  Hospice care patient   Patient's medication regimen simplified for comfort focused approach.  Most medications discontinued. He appears comfortable today, on scheduled morphine for pain control.   - Menthol, Topical Analgesic, (BIOFREEZE) 4 % GEL; Externally apply topically 2 times daily  - morphine sulfate 20 MG/5ML SOLN; Take 5 mg by mouth 2 times daily  - morphine sulfate 20 MG/5ML SOLN; Take 0.25 mLs by mouth every 2 hours as needed (pain/SOB)  - atropine 1 % SOLN; Place 2 drops under the tongue every 2 hours as needed for secretions    Orders written by provider at facility and transcribed by : Twin Hidalgo  1. No new orders at this time      Electronically signed by:  ROAXNA Obrien CNP              Sincerely,        ROXANA Obrien CNP

## 2019-08-18 NOTE — PROGRESS NOTES
[place] [facility]  Chief Complaint   Patient presents with     retirement Regulatory     [place] Medical Record Number:  [id number]  Place of Service where encounter took place:  [facility]  ([facility]) [[id number]]      HPI:    Evin Melgar  is 84 year old (6/1/1934), who is being seen today for a federally mandated E/M visit.  HPI information obtained from: facility staff, patient and his wife report and [facility] chart review.      Today's concerns are:   - Resident seen and examined. Reports does walk independently, - reports  breathing seems good,  occasional cough but no wheezing. .   - Wife in the room, lives together, reports her  is better  - RN reports Resident has been stable and might graduate from hospice service soon given improvement in his overall medical condition.   - Wife reports his eating is better, and is gaining weight.   -----------------------------------------------------------  - Past Medical, social, family histories, medications, and allergies reviewed and updated  - Medications reviewed: in the chart and EHR.   - Case Management:   I have reviewed the care plan and MDS and do agree with the plan. Patient's desire to return to the community is not present.  Information reviewed:  Medications, vital signs, orders, and nursing notes.    MEDICATIONS:  Current Outpatient Medications   Medication Sig Dispense Refill     acetaminophen (TYLENOL) 325 MG tablet Take 2 tablets (650 mg) by mouth 3 times daily. May also take 2 tablets (650 mg) 2 times daily as needed for mild pain.       albuterol (PROAIR HFA) 108 (90 Base) MCG/ACT inhaler Inhale 2 puffs into the lungs every 4 hours as needed for shortness of breath / dyspnea Rinse mouthpiece weekly. 1 Inhaler 11     atropine 1 % SOLN Place 2 drops under the tongue every 2 hours as needed for secretions       melatonin 5 MG tablet Take 1 tablet (5 mg) by mouth nightly as needed for sleep       Menthol, Topical  Analgesic, (BIOFREEZE) 4 % GEL Externally apply topically 2 times daily       morphine sulfate 20 MG/5ML SOLN Take 5 mg by mouth 2 times daily  0     morphine sulfate 20 MG/5ML SOLN Take 0.25 mLs by mouth every 2 hours as needed (pain/SOB)  0     ROS:  4 point ROS neg other than the symptoms noted above in the HPI.    Vitals:  BP (!) 158/80   Pulse 66   Temp 97.8  F (36.6  C)   Resp 16   Wt 46.7 kg (103 lb)   SpO2 98%   BMI 18.25 kg/m    Body mass index is 18.25 kg/m .  Exam:  GENERAL: no acute distress, sitting in the WC comfortably  RESP:  lungs clear to auscultation , no respiratory distress, no wheezing or coarse sounds  CV:  S1S2 nl, regular rate and rhythm, no murmur, rub, or gallop  ABDOMEN:  normal bowel sounds, soft, nontender, no hepatosplenomegaly or other masses  M/S:   no joint deformity noted.   SKIN:  no rash noted  NEURO:   no NFD appreciated on observation.   PSYCH:  memory is fair,  affect and mood normal    Lab/Diagnostic data: no new data to review.     ASSESSMENT/PLAN  # COPD, mild (H)  # Mild persistent asthma without complication  - doing fine.     # Coronary artery disease involving native coronary artery of native heart without angina pectoris  # Essential hypertension  #Hyperlipidemia   - clinically at baseline.     Mild Protein-calorie malnutrition (H)  - Body mass index is 18.25 kg/m .  In frail elderly keep BMI b/lw 25-35 Kg(m2).   - level below 22 in frail elderly may increase mortality.      Frailty:  - Significant  Deficits requiring NH placement. Requiring extensive assistance from nursing. Up for meals only o/w spends the day resting in bed     Late onset Alzheimer's disease without behavioral disturbance  - Continue to anticipate needs. Chronic condition, ongoing decline expected.   -  Continue to provide redirection and reassurance as needed. Maintain safe living situation with goals focused on comfort.    Hospice Care: Symptoms managed by  GNP and Hospice  Team.      Electronically signed by:  Blanco Browning MD

## 2019-08-19 NOTE — LETTER
8/19/2019        RE: Evin Melgar  [facility]  [address] [facility] [address]        [place] [facility]  Chief Complaint   Patient presents with     FPC Regulatory     [place] Medical Record Number:  [id number]  Place of Service where encounter took place:  [facility]  ([facility]) [[id number]]      HPI:    Evin Melgar  is 84 year old (6/1/1934), who is being seen today for a federally mandated E/M visit.  HPI information obtained from: facility staff, patient and his wife report and [facility] chart review.      Today's concerns are:   - Resident seen and examined. Reports does walk independently, - reports  breathing seems good,  occasional cough but no wheezing. .   - Wife in the room, lives together, reports her  is better  - RN reports Resident has been stable and might graduate from hospice service soon given improvement in his overall medical condition.   - Wife reports his eating is better, and is gaining weight.   -----------------------------------------------------------  - Past Medical, social, family histories, medications, and allergies reviewed and updated  - Medications reviewed: in the chart and EHR.   - Case Management:   I have reviewed the care plan and MDS and do agree with the plan. Patient's desire to return to the community is not present.  Information reviewed:  Medications, vital signs, orders, and nursing notes.    MEDICATIONS:  Current Outpatient Medications   Medication Sig Dispense Refill     acetaminophen (TYLENOL) 325 MG tablet Take 2 tablets (650 mg) by mouth 3 times daily. May also take 2 tablets (650 mg) 2 times daily as needed for mild pain.       albuterol (PROAIR HFA) 108 (90 Base) MCG/ACT inhaler Inhale 2 puffs into the lungs every 4 hours as needed for shortness of breath / dyspnea Rinse mouthpiece weekly. 1 Inhaler 11     atropine 1 % SOLN Place 2 drops under the tongue every 2 hours as needed for  secretions       melatonin 5 MG tablet Take 1 tablet (5 mg) by mouth nightly as needed for sleep       Menthol, Topical Analgesic, (BIOFREEZE) 4 % GEL Externally apply topically 2 times daily       morphine sulfate 20 MG/5ML SOLN Take 5 mg by mouth 2 times daily  0     morphine sulfate 20 MG/5ML SOLN Take 0.25 mLs by mouth every 2 hours as needed (pain/SOB)  0     ROS:  4 point ROS neg other than the symptoms noted above in the HPI.    Vitals:  BP (!) 158/80   Pulse 66   Temp 97.8  F (36.6  C)   Resp 16   Wt 46.7 kg (103 lb)   SpO2 98%   BMI 18.25 kg/m     Body mass index is 18.25 kg/m .  Exam:  GENERAL: no acute distress, sitting in the WC comfortably  RESP:  lungs clear to auscultation , no respiratory distress, no wheezing or coarse sounds  CV:  S1S2 nl, regular rate and rhythm, no murmur, rub, or gallop  ABDOMEN:  normal bowel sounds, soft, nontender, no hepatosplenomegaly or other masses  M/S:   no joint deformity noted.   SKIN:  no rash noted  NEURO:   no NFD appreciated on observation.   PSYCH:   memory is fair,  affect and mood normal    Lab/Diagnostic data: no new data to review.     ASSESSMENT/PLAN  # COPD, mild (H)  # Mild persistent asthma without complication  - doing fine.     # Coronary artery disease involving native coronary artery of native heart without angina pectoris  # Essential hypertension  #Hyperlipidemia   - clinically at baseline.     Mild Protein-calorie malnutrition (H)  - Body mass index is 18.25 kg/m .  In frail elderly keep BMI b/lw 25-35 Kg(m2).   - level below 22 in frail elderly may increase mortality.      Frailty:  - Significant  Deficits requiring NH placement. Requiring extensive assistance from nursing. Up for meals only o/w spends the day resting in bed     Late onset Alzheimer's disease without behavioral disturbance  - Continue to anticipate needs. Chronic condition, ongoing decline expected.   -  Continue to provide redirection and reassurance as needed. Maintain  safe living situation with goals focused on comfort.    Hospice Care: Symptoms managed by [facility] GNP and Hospice Team.      Electronically signed by:  Blanco Browning MD            Sincerely,        Blanco Browning MD

## 2019-09-20 NOTE — MR AVS SNAPSHOT
After Visit Summary   11/14/2018    Evin Melgar    MRN: [id number]           Patient Information     Date Of Birth          6/1/1934        Visit Information        Provider Department      11/14/2018 3:00 PM Katherine Presley MD [facility]        Care Instructions    Plan:    Stop the Seroquel (quetiapine) at bedtime, since you have noticed the hallucinations in correlation with the medication use. I will say that the hallucinations may just be representative of worsening of Evin's dementia as well. Something to keep in mind.  Continue the melatonin for sleep aid.  Let us know if you need any help in terms of facilitating home health/memory care placement.   Return to neurology in 6 months.           Follow-ups after your visit        Your next 10 appointments already scheduled     Nov 26, 2018  8:00 AM CST   LAB with [facility] ([facility])    [address] [address] [phone]           Please do not eat 10-12 hours before your appointment if you are coming in fasting for labs on lipids, cholesterol, or glucose (sugar). This does not apply to pregnant women. Water, hot tea and black coffee (with nothing added) are okay. Do not drink other fluids, diet soda or chew gum.            Nov 28, 2018  3:00 PM CST   Return Visit with Huang Toledo MD   [facility] ([facility])    [address] [id number] [phone]              Who to contact     If you have questions or need follow up information about today's clinic visit or your schedule please contact [facility] directly at [phone].  Normal or non-critical lab and imaging results will be communicated to you by MyChart, letter or phone within 4 business days after the clinic has received the results. If you do not hear from us within 7 days, please contact the clinic  Yes I will sign and please make sure tcm has been performed    "through [employer] or phone. If you have a critical or abnormal lab result, we will notify you by phone as soon as possible.  Submit refill requests through [employer] or call your pharmacy and they will forward the refill request to us. Please allow 3 business days for your refill to be completed.          Additional Information About Your Visit        [employer]hart Information     [employer] gives you secure access to your electronic health record. If you see a primary care provider, you can also send messages to your care team and make appointments. If you have questions, please call your primary care clinic.  If you do not have a primary care provider, please call [phone] and they will assist you.        Care EveryWhere ID     This is your Care EveryWhere ID. This could be used by other organizations to access your [place] medical records  [id number]        Your Vitals Were     Pulse Temperature Respirations Height BMI (Body Mass Index)       75 98.5  F (36.9  C) (Tympanic) 16 1.613 m (5' 3.5\") 18.94 kg/m2        Blood Pressure from Last 3 Encounters:   11/14/18 133/60   05/31/18 139/67   03/14/18 136/79    Weight from Last 3 Encounters:   11/14/18 49.3 kg (108 lb 9.6 oz)   05/31/18 49.8 kg (109 lb 12.8 oz)   03/14/18 50.3 kg (111 lb)              Today, you had the following     No orders found for display       Primary Care Provider Office Phone # Fax #    Brian Mehdi Perez [phone] [address] [address]        Equal Access to Services     [facility] Center[facility] : Hadii aby whitaker hadasho Sosravani, waaxda luqadaha, qaybta kaalmada westley martinez . So [facility] [phone].    ATENCIÓN: Si habla español, tiene a vargas disposición servicios gratuitos de asistencia lingüística. Llame al [phone].    We comply with applicable federal civil rights laws and [place] laws. We do not discriminate on the basis of race, color, national origin, " age, disability, sex, sexual orientation, or gender identity.            Thank you!     Thank you for choosing [facility]  for your care. Our goal is always to provide you with excellent care. Hearing back from our patients is one way we can continue to improve our services. Please take a few minutes to complete the written survey that you may receive in the mail after your visit with us. Thank you!             Your Updated Medication List - Protect others around you: Learn how to safely use, store and throw away your medicines at www.disposemymeds.org.          This list is accurate as of 11/14/18  3:37 PM.  Always use your most recent med list.                   Brand Name Dispense Instructions for use Diagnosis    albuterol 108 (90 Base) MCG/ACT inhaler    PROAIR HFA    1 Inhaler    Inhale 2 puffs into the lungs every 4 hours as needed for shortness of breath / dyspnea Rinse mouthpiece weekly.    Moderate persistent asthma without complication       amLODIPine 5 MG tablet    NORVASC    90 tablet    Take 1 tablet (5 mg) by mouth daily    Essential hypertension with goal blood pressure less than 140/90       atorvastatin 20 MG tablet    LIPITOR    90 tablet    Take 1 tablet (20 mg) by mouth daily    Hyperlipidemia LDL goal <100       clopidogrel 75 MG tablet    PLAVIX    90 tablet    Take 1 tablet (75 mg) by mouth daily    Coronary artery disease involving native coronary artery, angina presence unspecified, unspecified whether native or transplanted heart       fluticasone 50 MCG/ACT spray    FLONASE    16 g    Spray 1-2 sprays into both nostrils daily    Postnasal drip       guaiFENesin 200 MG Tabs tablet    ORGANIDIN    60 tablet    Take 2 tablets (400 mg) by mouth every 6 hours as needed for cough    Viral URI with cough       lisinopril 2.5 MG tablet    PRINIVIL/Zestril    90 tablet    Take 1 tablet (2.5 mg) by mouth daily    Essential hypertension with goal blood pressure less than 140/90        metoprolol succinate 25 MG 24 hr tablet    TOPROL-XL    90 tablet    Take 1 tablet (25 mg) by mouth daily    Syncope and collapse       nitroGLYcerin 0.4 MG sublingual tablet    NITROSTAT    25 tablet    Place 1 tablet (0.4 mg) under the tongue every 5 minutes as needed for chest pain for chest pain    CAD (coronary artery disease)       QUEtiapine 25 MG tablet    SEROQUEL    30 tablet    Take 0.5 tablets (12.5 mg) by mouth 2 times daily as needed    Late onset Alzheimer's disease with behavioral disturbance       ranitidine 150 MG tablet    ZANTAC    90 tablet    Take 1 tablet (150 mg) by mouth 2 times daily as needed for heartburn    Gastroesophageal reflux disease without esophagitis

## 2019-09-20 NOTE — PROGRESS NOTES
[facility]  [place] Medical Record Number:  [id number]  Place of Service where encounter took place:  [facility]  ([facility]) [[id number]]  Chief Complaint   Patient presents with     Nursing Home Acute       HPI:    Evin Melgar  is a 85 year old (6/1/1934), who is being seen today for an episodic care visit.  HPI information obtained from: facility chart records, facility staff, patient report and [facility] chart review. Today's concern is:     Late onset Alzheimer's disease without behavioral disturbance  Confusion  Generalized muscle weakness  Mixed stress and urge urinary incontinence     Patient seen today on request, increased confusion, weakness, fall yesterday without injury, newer incontinence of unknown etiology, urine sample taken and UA returned clean, lying in bed, pleasantly confused, wearing briefs now, thin habitus, per staff has generally been stable, considering normal age related and progressive decline.    Past Medical and Surgical History reviewed in Epic today.    MEDICATIONS:  Current Outpatient Medications   Medication Sig Dispense Refill     acetaminophen (TYLENOL) 325 MG tablet Take 2 tablets (650 mg) by mouth 3 times daily. May also take 2 tablets (650 mg) 2 times daily as needed for mild pain.       atropine 1 % SOLN Place 2 drops under the tongue every 2 hours as needed for secretions       melatonin 5 MG tablet Take 1 tablet (5 mg) by mouth nightly as needed for sleep       Menthol, Topical Analgesic, (BIOFREEZE) 4 % GEL Externally apply topically 2 times daily       morphine sulfate 20 MG/5ML SOLN Take 5 mg by mouth 2 times daily  0     morphine sulfate 20 MG/5ML SOLN Take 0.25 mLs by mouth every 2 hours as needed (pain/SOB)  0     QUEtiapine (SEROQUEL) 25 MG tablet Take 12.5 mg by mouth 2 times daily       sennosides (SENOKOT) 8.6 MG tablet Take 1 tablet by mouth daily         REVIEW OF SYSTEMS:  Limited secondary to cognitive impairment but today pt  reports I am feeling good    Objective:  /60   Pulse 70   Temp 97.8  F (36.6  C)   Resp 17   Wt 46.5 kg (102 lb 9.6 oz)   SpO2 97%   BMI 18.17 kg/m    Exam:  GENERAL APPEARANCE:  in no distress, thin  ENT:  Mouth and posterior oropharynx normal, moist mucous membranes  RESP:  lungs clear to auscultation   CV:  regular rate and rhythm, no murmur, rub, or gallop, no edema  ABDOMEN:  bowel sounds normal  M/S:   Gait and station abnormal requires assist/WC mobility  SKIN:  Inspection of skin and subcutaneous tissue baseline  NEURO:   Examination of sensation by touch normal  PSYCH:  oriented to unknown    Labs:   Recent labs in [facility] reviewed by me today.     ASSESSMENT/PLAN:  Late onset Alzheimer's disease without behavioral disturbance  Confusion  Generalized muscle weakness  Mixed stress and urge urinary incontinence  -status stable  -labs in 2018 and 2019 generally WNL  -UA returned, all expected values, afebrile, denies pain  -pending UC, consider Tx if +  -staff to support in secure environment        Electronically signed by:  ROXANA Robison CNP

## 2019-10-08 NOTE — LETTER
10/8/2019        RE: Evin Melgar  [facility]  [address] [facility] [address]        [facility]  No chief complaint on file.    [place] Medical Record Number:  [id number]  Place of Service where encounter took place:  No question data found.    HPI:    Evin Melgra  is a 85 year old  (6/1/1934), who is being seen today for an annual comprehensive visit. HPI information obtained from: {[facility] HPI:971916}.  Today's concerns are:  {[facility] DX:544355}  {HTN}    ALLERGIES: Patient has no known allergies.  PAST MEDICAL HISTORY:  has a past medical history of CAD (coronary artery disease) (9/4/2007), CKD (chronic kidney disease), stage III (H) (11/4/2008), GERD (gastroesophageal reflux disease) (11/4/2008), Hyperlipidaemia, Hypertension (12/13/2005), Inguinal hernia without mention of obstruction or gangrene, unilateral or unspecified, (not specified as recurrent), Memory deficit (3/2/2012), Microscopic hematuria (3/2/2012), Mild persistent asthma (12/13/2005), OAB (overactive bladder) (3/2/2012), Tubular adenoma (10/21/2008), and Unspecified essential hypertension. He also has no past medical history of Cancer (H), Obese, Sleep apnea, Thyroid disease, or Type 2 diabetes mellitus without complications (H).  PAST SURGICAL HISTORY:  has a past surgical history that includes surgical history of - ; angiogram (10/07); and angioplasty (9/07).  IMMUNIZATIONS:  Immunization History   Administered Date(s) Administered     Influenza (High Dose) 3 valent vaccine 01/08/2015, 10/13/2015, 11/13/2017, 12/03/2018     Influenza (IIV3) PF 12/02/2004, 01/07/2013     Pneumo Conj 13-V (2010&after) 01/16/2018     Pneumococcal 23 valent 11/18/2010     Above immunizations pulled from [facility]. [facility] and facility records also reconciled. Outstanding information sent to  to update [facility] ***.  Future immunizations {fgs future immunization:834170}  ***  Current Outpatient  Medications   Medication Sig Dispense Refill     acetaminophen (TYLENOL) 325 MG tablet Take 2 tablets (650 mg) by mouth 3 times daily. May also take 2 tablets (650 mg) 2 times daily as needed for mild pain.       atropine 1 % SOLN Place 2 drops under the tongue every 2 hours as needed for secretions       melatonin 5 MG tablet Take 1 tablet (5 mg) by mouth nightly as needed for sleep       Menthol, Topical Analgesic, (BIOFREEZE) 4 % GEL Externally apply topically 2 times daily       morphine sulfate 20 MG/5ML SOLN Take 5 mg by mouth 2 times daily  0     morphine sulfate 20 MG/5ML SOLN Take 0.25 mLs by mouth every 2 hours as needed (pain/SOB)  0     QUEtiapine (SEROQUEL) 25 MG tablet Take 12.5 mg by mouth 2 times daily       sennosides (SENOKOT) 8.6 MG tablet Take 1 tablet by mouth daily       ***  Case Management:  I have reviewed the {sannualcareplan1:664703} .{s cancer screenin} Patient's desire to return to the community is {[facility] RETURN TO COMMUNITY:341517}. Current Level of Care is appropriate.    Advance Directive Discussion:    I reviewed the current advanced directives as reflected in EPIC, the POLST and the facility chart, and verified the congruency of orders ***. I contacted the first party *** and {ADVANCED DIRECTIVE DISCUSSION:920021} plan of Care.  I {DID/DID NOT:443551} review the advance directives with the resident.     Team Discussion:  I communicated with the appropriate disciplines involved with the Plan of Care:   {NURSING HOME DISCIPLINES:807351}  Patient's goal is {fgsgoal:165940}.  Information reviewed:  Medications, vital signs, orders, and nursing notes.    ROS:  {awryey57:001355}    Vitals:  There were no vitals taken for this visit. There is no height or weight on file to calculate BMI.  Exam:  {[facility] physical exam :606613} {2 in each of 9 for comp exam}    Lab/Diagnostic data:   {fgslab:129895}    ASSESSMENT/PLAN  {FGS  DX:525551}  {HTN}    {fgsorders:880957}  ***    Electronically signed by:  ROXANA Stephens CNP ***  {Providers Please double check the med list (in the plan section >> meds & orders tab) and Discontinue any of the meds flagged by the TC to be discontinued}        [facility]  Chief Complaint   Patient presents with     Annual Comprehensive Nursing Home     [place] Medical Record Number:  [id number]  Place of Service where encounter took place:  No question data found.    HPI:    Evin Melgar  is a 85 year old  (6/1/1934), who is being seen today for an annual comprehensive visit. HPI information obtained from: {[facility] HPI:720246}.  Today's concerns are:  {[facility] DX:883111}  {HTN}    ALLERGIES: Patient has no known allergies.  PAST MEDICAL HISTORY:  has a past medical history of CAD (coronary artery disease) (9/4/2007), CKD (chronic kidney disease), stage III (H) (11/4/2008), GERD (gastroesophageal reflux disease) (11/4/2008), Hyperlipidaemia, Hypertension (12/13/2005), Inguinal hernia without mention of obstruction or gangrene, unilateral or unspecified, (not specified as recurrent), Memory deficit (3/2/2012), Microscopic hematuria (3/2/2012), Mild persistent asthma (12/13/2005), OAB (overactive bladder) (3/2/2012), Tubular adenoma (10/21/2008), and Unspecified essential hypertension. He also has no past medical history of Cancer (H), Obese, Sleep apnea, Thyroid disease, or Type 2 diabetes mellitus without complications (H).  PAST SURGICAL HISTORY:  has a past surgical history that includes surgical history of - ; angiogram (10/07); and angioplasty (9/07).  IMMUNIZATIONS:  Immunization History   Administered Date(s) Administered     Influenza (High Dose) 3 valent vaccine 01/08/2015, 10/13/2015, 11/13/2017, 12/03/2018     Influenza (IIV3) PF 12/02/2004, 01/07/2013     Pneumo Conj 13-V (2010&after) 01/16/2018     Pneumococcal 23 valent 11/18/2010     Above immunizations pulled from [facility].  [facility] and facility records also reconciled. Outstanding information sent to  to update [facility] ***.  Future immunizations {fgs future immunization:721415}  ***  Current Outpatient Medications   Medication Sig Dispense Refill     acetaminophen (TYLENOL) 325 MG tablet Take 2 tablets (650 mg) by mouth 3 times daily. May also take 2 tablets (650 mg) 2 times daily as needed for mild pain.       ACETAMINOPHEN PO Take 650 mg by mouth 3 times daily       atropine 1 % SOLN Place 2 drops under the tongue every 2 hours as needed for secretions       melatonin 5 MG tablet Take 5 mg by mouth At Bedtime        Menthol, Topical Analgesic, (BIOFREEZE) 4 % GEL Externally apply topically 2 times daily       morphine sulfate 20 MG/5ML SOLN Take 5 mg by mouth 2 times daily  0     morphine sulfate 20 MG/5ML SOLN Take 0.25 mLs by mouth every 2 hours as needed (pain/SOB)  0     QUEtiapine (SEROQUEL) 25 MG tablet Take 12.5 mg by mouth 2 times daily       sennosides (SENOKOT) 8.6 MG tablet Take 1 tablet by mouth daily       ***  Case Management:  I have reviewed the {fgsannualcareplan1:251100} .{fgs cancer screenin} Patient's desire to return to the community is {FGS RETURN TO COMMUNITY:216378}. Current Level of Care is appropriate.    Advance Directive Discussion:    I reviewed the current advanced directives as reflected in EPIC, the POLST and the facility chart, and verified the congruency of orders ***. I contacted the first party *** and {ADVANCED DIRECTIVE DISCUSSION:332946} plan of Care.  I {DID/DID NOT:680331} review the advance directives with the resident.     Team Discussion:  I communicated with the appropriate disciplines involved with the Plan of Care:   {NURSING HOME DISCIPLINES:442100}  Patient's goal is {fgsgoal:883374}.  Information reviewed:  Medications, vital signs, orders, and nursing notes.    ROS:  {stsjig72:332726}    Vitals:  /71   Pulse 57   Temp 97.6  F (36.4  C)   Resp 16    "Ht 1.6 m (5' 3\")   Wt 45.2 kg (99 lb 9.6 oz)   SpO2 95%   BMI 17.64 kg/m    Body mass index is 17.64 kg/m .  Exam:  {Nursing home physical exam :383130} {2 in each of 9 for comp exam}    Lab/Diagnostic data:   {fgslab:095632}    ASSESSMENT/PLAN  {FGS DX:378531}  {HTN}    {fgsorders:991417}  ***    Electronically signed by:  Dacia Encinas ***  {Providers Please double check the med list (in the plan section >> meds & orders tab) and Discontinue any of the meds flagged by the TC to be discontinued}          Sincerely,        ROXANA Stephens CNP    "

## 2019-10-08 NOTE — PROGRESS NOTES
[place] [facility]  Annual Physical exam  [place] Medical Record Number:  [id number]  Place of Service where encounter took place:  No question data found.  HPI: Evin Melgar  is a 85 year old  (6/1/1934), who is being seen today for an annual comprehensive visit. HPI information obtained from: facility chart records, facility staff, patient report, [place] Epic chart review and Care Everywhere [facility] chart review.  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 Enrolled in hospice in May 2019 and goals of care are now focused on comfort. Many nonessential medications discontinued at that time. Denies SOB, chest pain, palpitations, pain.  Chart review shows BIMS score 6/15 and PHQ-9 score 3/27, both from from August 20th. Noting Seroquel BID and nursing reports no new behaviors. Weight and BP trends noted below. Vitals stable. Immunizations UTD.   Weight Trends:  09/30/2019 13:40 101.8 Lbs   09/23/2019 13:51 101.6 Lbs  09/17/2019 16:20 102.6 Lbs   09/09/2019 14:07 98.4 Lbs  09/02/2019 09:40 103.6 Lbs   08/26/2019 14:36 104.4 Lbs   08/19/2019 14:09 103.4 Lbs  08/12/2019 13:58 103 Lbs  08/05/2019 10:37 104.2 Lbs   BP Trends:  09/29/2019 20:25 123/71 mmHg   09/29/2019 14:28 123/68 mmHg   09/21/2019 01:03 152/76 mmHg   09/20/2019 19:05 128/62 mmHg   09/20/2019 11:06 128/62 mmHg   09/20/2019 06:44 130/60 mmHg   09/19/2019 05:47 145/88 mmHg      ALLERGIES: Patient has no known allergies.PAST MEDICAL HISTORY:  has a past medical history of CAD (coronary artery disease) (9/4/2007), CKD (chronic kidney disease), stage III (H) (11/4/2008), GERD (gastroesophageal  reflux disease) (11/4/2008), Hyperlipidaemia, Hypertension (12/13/2005), Inguinal hernia without mention of obstruction or gangrene, unilateral or unspecified, (not specified as recurrent), Memory deficit (3/2/2012), Microscopic hematuria (3/2/2012), Mild persistent asthma (12/13/2005), OAB (overactive bladder) (3/2/2012), Tubular adenoma (10/21/2008), and Unspecified essential hypertension. He also has no past medical history of Cancer (H), Obese, Sleep apnea, Thyroid disease, or Type 2 diabetes mellitus without complications (H). PAST SURGICAL HISTORY:  has a past surgical history that includes surgical history of - ; angiogram (10/07); and angioplasty (9/07).  IMMUNIZATIONS:       Immunization History   Administered Date(s) Administered     Influenza (High Dose) 3 valent vaccine 01/08/2015, 10/13/2015, 11/13/2017, 12/03/2018     Influenza (IIV3) PF 12/02/2004, 01/07/2013     Pneumo Conj 13-V (2010&after) 01/16/2018     Pneumococcal 23 valent 11/18/2010    Above immunizations pulled from [place] [facility]. [facility] and facility records also reconciled. Outstanding information sent to  to update [facility] [facility].  Future immunizations are not needed at this point as all recommended immunizations are up to date.      Current Outpatient Prescriptions        Medication Sig     acetaminophen (TYLENOL) 325 MG tablet Take 2 tablets (650 mg) by mouth 3 times daily. May also take 2 tablets (650 mg) 2 times daily as needed for mild pain.     atropine 1 % SOLN Place 2 drops under the tongue every 2 hours as needed for secretions     melatonin 5 MG tablet Take 1 tablet (5 mg) by mouth nightly as needed for sleep     Menthol, Topical Analgesic, (BIOFREEZE) 4 % GEL Externally apply topically 2 times daily     morphine sulfate 20 MG/5ML SOLN Take 5 mg by mouth 2 times daily     morphine sulfate 20 MG/5ML SOLN Take 0.25 mLs by mouth every 2 hours as needed (pain/SOB)     QUEtiapine (SEROQUEL) 25 MG tablet Take 12.5 mg by  "mouth 2 times daily     sennosides (SENOKOT) 8.6 MG tablet Take 1 tablet by mouth daily         Case Management: I have reviewed the facility/SNF care plan/MDS, including the falls risk, nutrition and pain screening. I also reviewed the current immunizations, and preventive care. .Future cancer screening is not clinically indicated secondary to age/goals of care Patient's desire to return to the community is not assessible due to cognitive impairment. Current Level of Care is appropriate.     Advance Directive Discussion: I reviewed the current advanced directives as reflected in EPIC, the POLST and the facility chart, and verified the congruency of orders. I contacted the first party Pat (wife/spouse) and left a message regarding the plan of Care.  I did not due to cognitive impairment review the advance directives with the resident.      Team Discussion: I communicated with the appropriate disciplines involved with the Plan of Care: Nursing. Patient's goal is unobtainable secondary to cognitive impairment. Information reviewed:  Medications, vital signs, orders, and nursing notes.     ROS: Limited secondary to cognitive impairment and 4 point ROS including Respiratory, CV, GI and , other than that noted in the HPI, is negative.     Vitals: /71   Pulse 57   Temp 97.6  F (36.4  C)   Resp 16   Ht 1.6 m (5' 3\")   Wt 45.2 kg (99 lb 9.6 oz)   SpO2 95%   BMI 17.64 kg/m    Exam:  GENERAL APPEARANCE: Alert, in no distress, cooperative.   ENT: Mouth/posterior oropharynx intact w/ moist mucous membranes, hearing acuity [place].  EYES: EOM, conjunctivae, lids, pupils and irises normal, PERRL2.   RESP: Respiratory effort good, no respiratory distress, Lung sounds clear. On RA.   CV: Auscultation of heart reveals S1, S2, rate and rhythm regular, no murmur, no rub or gallop, Edema 0+ BLE. Peripheral pulses are 2+.  ABDOMEN: Normal bowel sounds, soft, non-tender abdomen, and no masses palpated.  SKIN: " Inspection/Palpation of skin and subcutaneous tissue baseline w/ fragility. No wounds/rashes noted.  NEURO: CN II-XII at patient's baseline, sensation baseline PPS.  PSYCH: Insight, judgement, and memory are baseline impaired, affect and mood are happy, engaged.     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 Stable. Appears to be adjusted to long-term stay. Pain controlled with scheduled and PRN morphine.  Blood pressures stable without medication management. Goal BP <150/90 to reduce risk of falls, hypotension.  Reviewed CBC/BMP from March/April 2019, grossly WDL. Lipid panel reviewed from 11/2018, stable. Will recheck TSH and A1c at this time, as treatment for these may add to comfort and QOL. Provider left message with patient's wife, awaiting call back. Follow-up routinely or as needed.     Orders written by provider at facility and transcribed by :   1. TSH, A1c x1 on 10/9 Dx: Routine physical exam  2. Discontinue melatonin.     Electronically signed by:  Dr. Anum Schwarz, ROXANA CNP DNP  & Mally Flynn DNP Student     This patient was seen and examined by a licensed healthcare provider, alongside an NP student, and with the consent of the patient.

## 2019-10-08 NOTE — LETTER
10/8/2019        RE: Evin Melgar  [facility]  [address] [facility] [address]        [place] [facility]  Annual Physical exam  [place] Medical Record Number:  [id number]  Place of Service where encounter took place:  No question data found.  HPI: Evin Melgar  is a 85 year old  (6/1/1934), who is being seen today for an annual comprehensive visit. HPI information obtained from: facility chart records, facility staff, patient report, [place] Epic chart review and Care Everywhere [facility] chart review.  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 Enrolled in hospice in May 2019 and goals of care are now focused on comfort. Many nonessential medications discontinued at that time. Denies SOB, chest pain, palpitations, pain.  Chart review shows BIMS score 6/15 and PHQ-9 score 3/27, both from from August 20th. Noting Seroquel BID and nursing reports no new behaviors. Weight and BP trends noted below. Vitals stable. Immunizations UTD.   Weight Trends:  09/30/2019 13:40 101.8 Lbs   09/23/2019 13:51 101.6 Lbs  09/17/2019 16:20 102.6 Lbs   09/09/2019 14:07 98.4 Lbs  09/02/2019 09:40 103.6 Lbs   08/26/2019 14:36 104.4 Lbs   08/19/2019 14:09 103.4 Lbs  08/12/2019 13:58 103 Lbs  08/05/2019 10:37 104.2 Lbs   BP Trends:  09/29/2019 20:25 123/71 mmHg   09/29/2019 14:28 123/68 mmHg   09/21/2019 01:03 152/76 mmHg   09/20/2019 19:05 128/62 mmHg   09/20/2019 11:06 128/62 mmHg   09/20/2019 06:44 130/60 mmHg   09/19/2019 05:47 145/88 mmHg      ALLERGIES: Patient has no known allergies.PAST MEDICAL HISTORY:  has a past medical history of CAD  (coronary artery disease) (9/4/2007), CKD (chronic kidney disease), stage III (H) (11/4/2008), GERD (gastroesophageal reflux disease) (11/4/2008), Hyperlipidaemia, Hypertension (12/13/2005), Inguinal hernia without mention of obstruction or gangrene, unilateral or unspecified, (not specified as recurrent), Memory deficit (3/2/2012), Microscopic hematuria (3/2/2012), Mild persistent asthma (12/13/2005), OAB (overactive bladder) (3/2/2012), Tubular adenoma (10/21/2008), and Unspecified essential hypertension. He also has no past medical history of Cancer (H), Obese, Sleep apnea, Thyroid disease, or Type 2 diabetes mellitus without complications (H). PAST SURGICAL HISTORY:  has a past surgical history that includes surgical history of - ; angiogram (10/07); and angioplasty (9/07).  IMMUNIZATIONS:       Immunization History   Administered Date(s) Administered     Influenza (High Dose) 3 valent vaccine 01/08/2015, 10/13/2015, 11/13/2017, 12/03/2018     Influenza (IIV3) PF 12/02/2004, 01/07/2013     Pneumo Conj 13-V (2010&after) 01/16/2018     Pneumococcal 23 valent 11/18/2010    Above immunizations pulled from [facility]. [facility] and facility records also reconciled. Outstanding information sent to  to update [facility].  Future immunizations are not needed at this point as all recommended immunizations are up to date.      Current Outpatient Prescriptions        Medication Sig     acetaminophen (TYLENOL) 325 MG tablet Take 2 tablets (650 mg) by mouth 3 times daily. May also take 2 tablets (650 mg) 2 times daily as needed for mild pain.     atropine 1 % SOLN Place 2 drops under the tongue every 2 hours as needed for secretions     melatonin 5 MG tablet Take 1 tablet (5 mg) by mouth nightly as needed for sleep     Menthol, Topical Analgesic, (BIOFREEZE) 4 % GEL Externally apply topically 2 times daily     morphine sulfate 20 MG/5ML SOLN Take 5 mg by mouth 2 times daily     morphine sulfate 20 MG/5ML  "SOLN Take 0.25 mLs by mouth every 2 hours as needed (pain/SOB)     QUEtiapine (SEROQUEL) 25 MG tablet Take 12.5 mg by mouth 2 times daily     sennosides (SENOKOT) 8.6 MG tablet Take 1 tablet by mouth daily         Case Management: I have reviewed the facility/SNF care plan/MDS, including the falls risk, nutrition and pain screening. I also reviewed the current immunizations, and preventive care. .Future cancer screening is not clinically indicated secondary to age/goals of care Patient's desire to return to the community is not assessible due to cognitive impairment. Current Level of Care is appropriate.     Advance Directive Discussion: I reviewed the current advanced directives as reflected in EPIC, the POLST and the facility chart, and verified the congruency of orders. I contacted the first party Pat (wife/spouse) and left a message regarding the plan of Care.  I did not due to cognitive impairment review the advance directives with the resident.      Team Discussion: I communicated with the appropriate disciplines involved with the Plan of Care: Nursing. Patient's goal is unobtainable secondary to cognitive impairment. Information reviewed:  Medications, vital signs, orders, and nursing notes.     ROS: Limited secondary to cognitive impairment and 4 point ROS including Respiratory, CV, GI and , other than that noted in the HPI, is negative.     Vitals: /71   Pulse 57   Temp 97.6  F (36.4  C)   Resp 16   Ht 1.6 m (5' 3\")   Wt 45.2 kg (99 lb 9.6 oz)   SpO2 95%   BMI 17.64 kg/m     Exam:  GENERAL APPEARANCE: Alert, in no distress, cooperative.   ENT: Mouth/posterior oropharynx intact w/ moist mucous membranes, hearing acuity [place].  EYES: EOM, conjunctivae, lids, pupils and irises normal, PERRL2.   RESP: Respiratory effort good, no respiratory distress, Lung sounds clear. On RA.   CV: Auscultation of heart reveals S1, S2, rate and rhythm regular, no murmur, no rub or gallop, Edema 0+ BLE. Peripheral " pulses are 2+.  ABDOMEN: Normal bowel sounds, soft, non-tender abdomen, and no masses palpated.  SKIN: Inspection/Palpation of skin and subcutaneous tissue baseline w/ fragility. No wounds/rashes noted.  NEURO: CN II-XII at patient's baseline, sensation baseline PPS.  PSYCH: Insight, judgement, and memory are baseline impaired, affect and mood are happy, engaged.     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 Stable. Appears to be adjusted to long-term stay. Pain controlled with scheduled and PRN morphine.  Blood pressures stable without medication management. Goal BP <150/90 to reduce risk of falls, hypotension.  Reviewed CBC/BMP from March/April 2019, grossly WDL. Lipid panel reviewed from 11/2018, stable. Will recheck TSH and A1c at this time, as treatment for these may add to comfort and QOL. Provider left message with patient's wife, awaiting call back. Follow-up routinely or as needed.     Orders written by provider at facility and transcribed by :   1. TSH, A1c x1 on 10/9 Dx: Routine physical exam  2. Discontinue melatonin.     Electronically signed by:  ROXANA Nelson CNP DNP  & Mally Flynn DNP Student   This patient was seen and examined by a licensed healthcare provider, alongside an NP student, and with the consent of the patient.         Sincerely,        ROXANA Stephens CNP

## 2019-10-14 NOTE — PROGRESS NOTES
[place] [facility]  [place] Medical Record Number:  [id number]  Place of Service where encounter took place:  [facility]  ([facility]) [[id number]]  Chief Complaint   Patient presents with     Nursing Home Acute   HPI: Evin Melgar  is a 85 year old (6/1/1934), who is being seen today for an episodic care visit.  HPI information obtained from: facility chart records, facility staff, patient report, [facility] chart review and Care Everywhere [facility] chart review. Today's concern is:    Closed fracture of intertrochanteric section of femur, right, initial encounter (H)  Closed fracture of left inferior pubic ramus, initial encounter (H)  Closed fracture of left superior pubic ramus, initial encounter (H)  Fall, initial encounter  Pain  Frail elderly  Hospice care patient  Nursing consulted provider today after patient's mechanical fall over the weekend. He was in acute pain and XR's were obtained showing a right femur fracture. Hospice and family worked together and the plan is to treat patient non-surgically. A specialized bed/mattress is being ordered for comfort and prevention. Upon reviewing XRs today, provider also noting pubic rami fractures which were thought to be subacute secondary to callus formation on XR.     During the visit, patient drowsy, unintelligible, unable to answer questions. Appears comfortable and resting, slight grimace. Wife at bedside. Nursing notes pain during repositioning in bed.    Past Medical and Surgical History reviewed in Epic today.  REVIEW OF SYSTEMS: Unobtainable secondary to cognitive impairment.     Objective:  BP 97/56   Pulse 69   Temp 99.3  F (37.4  C)   Resp 10   Wt 45.2 kg (99 lb 9.6 oz)   SpO2 95%   BMI 17.64 kg/m    Exam:  GENERAL APPEARANCE: Drowsy, in no distress, withdrawn.   ENT: Mouth/posterior oropharynx intact w/ moist mucous membranes, hearing acuity not tested.  EYES: EOM, conjunctivae, lids, pupils and irises normal, PERRL2.   RESP:  Respiratory effort fair, no respiratory distress, Lung sounds clear/diminished. On RA.   CV: Auscultation of heart reveals S1, S2, rate and rhythm regular, no murmur, no rub or gallop, Edema 0+ BLE. Peripheral pulses are 2+.  ABDOMEN: Normal bowel sounds, soft, non-tender abdomen, and no masses palpated.  SKIN: Inspection/Palpation of skin and subcutaneous tissue baseline w/ fragility. No wounds/rashes noted.   NEURO: CN II-XII unable to be fully tested, sensation unknown.  PSYCH: Insight, judgement, and memory are unable to be reviewed, affect and mood are withdrawn/comfortable.    Labs:   Recent labs in [facility] reviewed by me today.     ASSESSMENT/PLAN:  Closed fracture of intertrochanteric section of femur, right, initial encounter (H)  Closed fracture of left inferior pubic ramus, initial encounter (H)  Closed fracture of left superior pubic ramus, initial encounter (H)  Fall, initial encounter  Pain  Frail elderly  Hospice care patient  Acute. Unstable. Given goals-of-care, we will not consult orthopedics at this time and instead promote quality of life through comfort interventions in collaboration with hospice, nursing, and family.  Follow-up routinely or as needed.    Orders written by provider at facility and transcribed by : Twin Hidalgo  No new orders.    Electronically signed by:  ROXANA Nelson CNP DNP

## 2019-10-14 NOTE — LETTER
10/14/2019        RE: Evin Melgar  [facility]  [address] [facility] [address]        [place] [facility]  [place] Medical Record Number:  [id number]  Place of Service where encounter took place:  [facility]  ([facility]) [[id number]]  Chief Complaint   Patient presents with     Nursing Home Acute   HPI: Evin Melgar  is a 85 year old (6/1/1934), who is being seen today for an episodic care visit.  HPI information obtained from: facility chart records, facility staff, patient report, [facility] chart review and Care Everywhere [facility] chart review. Today's concern is:    Closed fracture of intertrochanteric section of femur, right, initial encounter (H)  Closed fracture of left inferior pubic ramus, initial encounter (H)  Closed fracture of left superior pubic ramus, initial encounter (H)  Fall, initial encounter  Pain  Frail elderly  Hospice care patient  Nursing consulted provider today after patient's mechanical fall over the weekend. He was in acute pain and XR's were obtained showing a right femur fracture. Hospice and family worked together and the plan is to treat patient non-surgically. A specialized bed/mattress is being ordered for comfort and prevention. Upon reviewing XRs today, provider also noting pubic rami fractures which were thought to be subacute secondary to callus formation on XR.     During the visit, patient drowsy, unintelligible, unable to answer questions. Appears comfortable and resting, slight grimace. Wife at bedside. Nursing notes pain during repositioning in bed.    Past Medical and Surgical History reviewed in Epic today.  REVIEW OF SYSTEMS: Unobtainable secondary to cognitive impairment.     Objective:  BP 97/56   Pulse 69   Temp 99.3  F (37.4  C)   Resp 10   Wt 45.2 kg (99 lb 9.6 oz)   SpO2 95%   BMI 17.64 kg/m     Exam:  GENERAL APPEARANCE: Drowsy, in no distress, withdrawn.   ENT: Mouth/posterior oropharynx intact w/ moist  mucous membranes, hearing acuity not tested.  EYES: EOM, conjunctivae, lids, pupils and irises normal, PERRL2.   RESP: Respiratory effort fair, no respiratory distress, Lung sounds clear/diminished. On RA.   CV: Auscultation of heart reveals S1, S2, rate and rhythm regular, no murmur, no rub or gallop, Edema 0+ BLE. Peripheral pulses are 2+.  ABDOMEN: Normal bowel sounds, soft, non-tender abdomen, and no masses palpated.  SKIN: Inspection/Palpation of skin and subcutaneous tissue baseline w/ fragility. No wounds/rashes noted.   NEURO: CN II-XII unable to be fully tested, sensation unknown.  PSYCH: Insight, judgement, and memory are unable to be reviewed, affect and mood are withdrawn/comfortable.    Labs:   Recent labs in [facility] reviewed by me today.     ASSESSMENT/PLAN:  Closed fracture of intertrochanteric section of femur, right, initial encounter (H)  Closed fracture of left inferior pubic ramus, initial encounter (H)  Closed fracture of left superior pubic ramus, initial encounter (H)  Fall, initial encounter  Pain  Frail elderly  Hospice care patient  Acute. Unstable. Given goals-of-care, we will not consult orthopedics at this time and instead promote quality of life through comfort interventions in collaboration with hospice, nursing, and family.  Follow-up routinely or as needed.    Orders written by provider at facility and transcribed by : Twin Hidalgo  No new orders.    Electronically signed by:  ROXANA Nelson CNP DNP      Sincerely,        ROXANA Stephens CNP